# Patient Record
Sex: MALE | Race: WHITE | NOT HISPANIC OR LATINO
[De-identification: names, ages, dates, MRNs, and addresses within clinical notes are randomized per-mention and may not be internally consistent; named-entity substitution may affect disease eponyms.]

---

## 2021-05-27 PROBLEM — Z00.00 ENCOUNTER FOR PREVENTIVE HEALTH EXAMINATION: Status: ACTIVE | Noted: 2021-05-27

## 2021-06-03 ENCOUNTER — APPOINTMENT (OUTPATIENT)
Dept: CARDIOLOGY | Facility: CLINIC | Age: 61
End: 2021-06-03
Payer: MEDICAID

## 2021-06-03 ENCOUNTER — NON-APPOINTMENT (OUTPATIENT)
Age: 61
End: 2021-06-03

## 2021-06-03 VITALS
WEIGHT: 280 LBS | OXYGEN SATURATION: 97 % | TEMPERATURE: 98.5 F | SYSTOLIC BLOOD PRESSURE: 210 MMHG | DIASTOLIC BLOOD PRESSURE: 80 MMHG | BODY MASS INDEX: 35.94 KG/M2 | HEART RATE: 83 BPM | HEIGHT: 74 IN

## 2021-06-03 VITALS — SYSTOLIC BLOOD PRESSURE: 190 MMHG | DIASTOLIC BLOOD PRESSURE: 80 MMHG

## 2021-06-03 DIAGNOSIS — Z86.39 PERSONAL HISTORY OF OTHER ENDOCRINE, NUTRITIONAL AND METABOLIC DISEASE: ICD-10-CM

## 2021-06-03 DIAGNOSIS — Z87.891 PERSONAL HISTORY OF NICOTINE DEPENDENCE: ICD-10-CM

## 2021-06-03 DIAGNOSIS — I10 ESSENTIAL (PRIMARY) HYPERTENSION: ICD-10-CM

## 2021-06-03 DIAGNOSIS — R60.0 LOCALIZED EDEMA: ICD-10-CM

## 2021-06-03 DIAGNOSIS — R06.02 SHORTNESS OF BREATH: ICD-10-CM

## 2021-06-03 PROCEDURE — 93000 ELECTROCARDIOGRAM COMPLETE: CPT

## 2021-06-03 PROCEDURE — 99205 OFFICE O/P NEW HI 60 MIN: CPT

## 2021-06-03 PROCEDURE — 99072 ADDL SUPL MATRL&STAF TM PHE: CPT

## 2021-06-03 RX ORDER — INSULIN LISPRO 100 U/ML
100 INJECTION, SOLUTION INTRAVENOUS; SUBCUTANEOUS
Refills: 0 | Status: ACTIVE | COMMUNITY

## 2021-06-03 RX ORDER — AMLODIPINE BESYLATE 10 MG/1
10 TABLET ORAL
Qty: 90 | Refills: 2 | Status: ACTIVE | COMMUNITY

## 2021-06-03 RX ORDER — LISINOPRIL 30 MG/1
30 TABLET ORAL DAILY
Qty: 90 | Refills: 3 | Status: ACTIVE | COMMUNITY

## 2021-06-03 NOTE — REASON FOR VISIT
[Symptom and Test Evaluation] : symptom and test evaluation [Cardiac Failure] : cardiac failure [Arrhythmia/ECG Abnorrmalities] : arrhythmia/ECG abnormalities [Hypertension] : hypertension

## 2021-06-22 NOTE — DISCUSSION/SUMMARY
[___ Week(s)] : in [unfilled] week(s) [FreeTextEntry3] : echo, leg doppler, nuclear stress test [FreeTextEntry1] : Continue on amlodipine and lisinopril for blood pressure management plus aspirin for cardiac risk reduction.  I renewed torsemide 20 mg 1 to 2 tablets daily as directed for edema reduction and am unclear but he possibly has been on 100 mg of torsemide intermittently in the past.  I have ordered an echocardiogram to assess LV function and valvular status.  To assess underlying coronary disease burden with current cardiovascular disease risk factors and symptoms, I have ordered a nuclear perfusion stress test.  I have ordered a bilateral lower extremity venous Doppler to assess leg circulation to check for any significant venous insufficiency.  I recommended a heart healthy lifestyle including a low-saturated fat, low cholesterol diet with improved aerobic physical fitness over time for cardiovascular benefits.  Carbohydrate and sodium restriction along with weight loss over time encouraged.  Follow-up with you for care and see me in about 1 month or sooner if needed.

## 2021-06-22 NOTE — HISTORY OF PRESENT ILLNESS
[FreeTextEntry1] : Jeremy is a pleasant 61-year-old gentleman with history of type 2 diabetes, dyslipidemia, hypertension and comes to our office for cardiac assessment.  He is a former smoker and indicates currently he has been experiencing shortness of breath and worsening leg edema bilaterally.  He takes his medications faithfully including antihypertensives and diuretics which have helped.  ECG today shows left anterior hemiblock old lateral wall MI and nonspecific ST-T wave abnormalities.

## 2021-06-22 NOTE — PHYSICAL EXAM
[Well Developed] : well developed [Well Nourished] : well nourished [No Acute Distress] : no acute distress [Normal Conjunctiva] : normal conjunctiva [Normal Venous Pressure] : normal venous pressure [No Carotid Bruit] : no carotid bruit [Normal S1, S2] : normal S1, S2 [No Murmur] : no murmur [No Rub] : no rub [No Gallop] : no gallop [1+] : left 1+ [Clear Lung Fields] : clear lung fields [Good Air Entry] : good air entry [No Respiratory Distress] : no respiratory distress  [Soft] : abdomen soft [Non Tender] : non-tender [No Masses/organomegaly] : no masses/organomegaly [Normal Bowel Sounds] : normal bowel sounds [Normal Gait] : normal gait [No Edema] : no edema [No Cyanosis] : no cyanosis [No Clubbing] : no clubbing [No Varicosities] : no varicosities [No Rash] : no rash [No Skin Lesions] : no skin lesions [Moves all extremities] : moves all extremities [No Focal Deficits] : no focal deficits [Normal Speech] : normal speech [Alert and Oriented] : alert and oriented [Normal memory] : normal memory [Right Carotid Bruit] : no bruit heard over the right carotid [Left Carotid Bruit] : no bruit heard over the left carotid [Bruit] : no bruit heard

## 2021-06-22 NOTE — CARDIOLOGY SUMMARY
[de-identified] : Sinus  Rhythm  Left anterior hemiblock  -Old lateral infarct.  Nonspecific ST - T abnormalities.  ABNORMAL

## 2021-07-21 ENCOUNTER — RX RENEWAL (OUTPATIENT)
Age: 61
End: 2021-07-21

## 2021-07-22 ENCOUNTER — RX RENEWAL (OUTPATIENT)
Age: 61
End: 2021-07-22

## 2021-07-22 RX ORDER — TORSEMIDE 20 MG/1
20 TABLET ORAL
Qty: 180 | Refills: 2 | Status: ACTIVE | COMMUNITY
Start: 2021-07-22 | End: 1900-01-01

## 2022-10-31 ENCOUNTER — RX RENEWAL (OUTPATIENT)
Age: 62
End: 2022-10-31

## 2022-11-11 ENCOUNTER — INPATIENT (INPATIENT)
Facility: HOSPITAL | Age: 62
LOS: 5 days | Discharge: HOME HEALTH | DRG: 280 | End: 2022-11-17
Attending: HOSPITALIST | Admitting: INTERNAL MEDICINE
Payer: MEDICAID

## 2022-11-11 VITALS
DIASTOLIC BLOOD PRESSURE: 67 MMHG | WEIGHT: 229.94 LBS | OXYGEN SATURATION: 95 % | HEIGHT: 74 IN | RESPIRATION RATE: 18 BRPM | TEMPERATURE: 98 F | HEART RATE: 63 BPM | SYSTOLIC BLOOD PRESSURE: 172 MMHG

## 2022-11-11 DIAGNOSIS — E78.5 HYPERLIPIDEMIA, UNSPECIFIED: ICD-10-CM

## 2022-11-11 DIAGNOSIS — N17.9 ACUTE KIDNEY FAILURE, UNSPECIFIED: ICD-10-CM

## 2022-11-11 DIAGNOSIS — I21.4 NON-ST ELEVATION (NSTEMI) MYOCARDIAL INFARCTION: ICD-10-CM

## 2022-11-11 DIAGNOSIS — Z90.49 ACQUIRED ABSENCE OF OTHER SPECIFIED PARTS OF DIGESTIVE TRACT: Chronic | ICD-10-CM

## 2022-11-11 DIAGNOSIS — E11.9 TYPE 2 DIABETES MELLITUS WITHOUT COMPLICATIONS: ICD-10-CM

## 2022-11-11 DIAGNOSIS — I10 ESSENTIAL (PRIMARY) HYPERTENSION: ICD-10-CM

## 2022-11-11 DIAGNOSIS — Z89.421 ACQUIRED ABSENCE OF OTHER RIGHT TOE(S): Chronic | ICD-10-CM

## 2022-11-11 DIAGNOSIS — I50.21 ACUTE SYSTOLIC (CONGESTIVE) HEART FAILURE: ICD-10-CM

## 2022-11-11 DIAGNOSIS — J18.9 PNEUMONIA, UNSPECIFIED ORGANISM: ICD-10-CM

## 2022-11-11 LAB
ALBUMIN SERPL ELPH-MCNC: 3.7 G/DL — SIGNIFICANT CHANGE UP (ref 3.3–5)
ALP SERPL-CCNC: 65 U/L — SIGNIFICANT CHANGE UP (ref 40–120)
ALT FLD-CCNC: 26 U/L — SIGNIFICANT CHANGE UP (ref 10–45)
ANION GAP SERPL CALC-SCNC: 11 MMOL/L — SIGNIFICANT CHANGE UP (ref 5–17)
APPEARANCE UR: CLEAR — SIGNIFICANT CHANGE UP
APTT BLD: 128 SEC — CRITICAL HIGH (ref 27.5–35.5)
APTT BLD: 40.6 SEC — HIGH (ref 27.5–35.5)
AST SERPL-CCNC: 20 U/L — SIGNIFICANT CHANGE UP (ref 10–40)
BACTERIA # UR AUTO: NEGATIVE — SIGNIFICANT CHANGE UP
BILIRUB SERPL-MCNC: 0.8 MG/DL — SIGNIFICANT CHANGE UP (ref 0.2–1.2)
BILIRUB UR-MCNC: NEGATIVE — SIGNIFICANT CHANGE UP
BUN SERPL-MCNC: 60 MG/DL — HIGH (ref 7–23)
CALCIUM SERPL-MCNC: 9.1 MG/DL — SIGNIFICANT CHANGE UP (ref 8.4–10.5)
CHLORIDE SERPL-SCNC: 100 MMOL/L — SIGNIFICANT CHANGE UP (ref 96–108)
CK MB BLD-MCNC: 1.1 % — SIGNIFICANT CHANGE UP (ref 0–3.5)
CK MB CFR SERPL CALC: 5 NG/ML — SIGNIFICANT CHANGE UP (ref 0–6.7)
CK SERPL-CCNC: 454 U/L — HIGH (ref 30–200)
CO2 SERPL-SCNC: 24 MMOL/L — SIGNIFICANT CHANGE UP (ref 22–31)
COLOR SPEC: SIGNIFICANT CHANGE UP
CREAT ?TM UR-MCNC: 83 MG/DL — SIGNIFICANT CHANGE UP
CREAT SERPL-MCNC: 1.96 MG/DL — HIGH (ref 0.5–1.3)
DIFF PNL FLD: NEGATIVE — SIGNIFICANT CHANGE UP
EGFR: 38 ML/MIN/1.73M2 — LOW
EPI CELLS # UR: 0 /HPF — SIGNIFICANT CHANGE UP
GLUCOSE BLDC GLUCOMTR-MCNC: 318 MG/DL — HIGH (ref 70–99)
GLUCOSE BLDC GLUCOMTR-MCNC: 326 MG/DL — HIGH (ref 70–99)
GLUCOSE SERPL-MCNC: 253 MG/DL — HIGH (ref 70–99)
GLUCOSE UR QL: ABNORMAL
HCT VFR BLD CALC: 31.2 % — LOW (ref 39–50)
HCT VFR BLD CALC: 32.1 % — LOW (ref 39–50)
HGB BLD-MCNC: 10.2 G/DL — LOW (ref 13–17)
HGB BLD-MCNC: 10.6 G/DL — LOW (ref 13–17)
HYALINE CASTS # UR AUTO: 1 /LPF — SIGNIFICANT CHANGE UP (ref 0–2)
INR BLD: 1.2 RATIO — HIGH (ref 0.88–1.16)
KETONES UR-MCNC: NEGATIVE — SIGNIFICANT CHANGE UP
LEUKOCYTE ESTERASE UR-ACNC: NEGATIVE — SIGNIFICANT CHANGE UP
MCHC RBC-ENTMCNC: 30.4 PG — SIGNIFICANT CHANGE UP (ref 27–34)
MCHC RBC-ENTMCNC: 31.5 PG — SIGNIFICANT CHANGE UP (ref 27–34)
MCHC RBC-ENTMCNC: 32.7 GM/DL — SIGNIFICANT CHANGE UP (ref 32–36)
MCHC RBC-ENTMCNC: 33 GM/DL — SIGNIFICANT CHANGE UP (ref 32–36)
MCV RBC AUTO: 93.1 FL — SIGNIFICANT CHANGE UP (ref 80–100)
MCV RBC AUTO: 95.5 FL — SIGNIFICANT CHANGE UP (ref 80–100)
NITRITE UR-MCNC: NEGATIVE — SIGNIFICANT CHANGE UP
NRBC # BLD: 0 /100 WBCS — SIGNIFICANT CHANGE UP (ref 0–0)
NRBC # BLD: 0 /100 WBCS — SIGNIFICANT CHANGE UP (ref 0–0)
PH UR: 6 — SIGNIFICANT CHANGE UP (ref 5–8)
PLATELET # BLD AUTO: 217 K/UL — SIGNIFICANT CHANGE UP (ref 150–400)
PLATELET # BLD AUTO: 222 K/UL — SIGNIFICANT CHANGE UP (ref 150–400)
POTASSIUM SERPL-MCNC: 4.6 MMOL/L — SIGNIFICANT CHANGE UP (ref 3.5–5.3)
POTASSIUM SERPL-SCNC: 4.6 MMOL/L — SIGNIFICANT CHANGE UP (ref 3.5–5.3)
PROT SERPL-MCNC: 7.4 G/DL — SIGNIFICANT CHANGE UP (ref 6–8.3)
PROT UR-MCNC: ABNORMAL
PROTHROM AB SERPL-ACNC: 13.9 SEC — HIGH (ref 10.5–13.4)
RBC # BLD: 3.35 M/UL — LOW (ref 4.2–5.8)
RBC # BLD: 3.36 M/UL — LOW (ref 4.2–5.8)
RBC # FLD: 13.2 % — SIGNIFICANT CHANGE UP (ref 10.3–14.5)
RBC # FLD: 13.2 % — SIGNIFICANT CHANGE UP (ref 10.3–14.5)
RBC CASTS # UR COMP ASSIST: 5 /HPF — HIGH (ref 0–4)
SARS-COV-2 RNA SPEC QL NAA+PROBE: SIGNIFICANT CHANGE UP
SODIUM SERPL-SCNC: 135 MMOL/L — SIGNIFICANT CHANGE UP (ref 135–145)
SP GR SPEC: 1.01 — SIGNIFICANT CHANGE UP (ref 1.01–1.02)
TROPONIN T, HIGH SENSITIVITY RESULT: 872 NG/L — HIGH (ref 0–51)
UROBILINOGEN FLD QL: NEGATIVE — SIGNIFICANT CHANGE UP
WBC # BLD: 10.88 K/UL — HIGH (ref 3.8–10.5)
WBC # BLD: 11.01 K/UL — HIGH (ref 3.8–10.5)
WBC # FLD AUTO: 10.88 K/UL — HIGH (ref 3.8–10.5)
WBC # FLD AUTO: 11.01 K/UL — HIGH (ref 3.8–10.5)
WBC UR QL: 2 /HPF — SIGNIFICANT CHANGE UP (ref 0–5)

## 2022-11-11 PROCEDURE — 76770 US EXAM ABDO BACK WALL COMP: CPT | Mod: 26

## 2022-11-11 PROCEDURE — 99223 1ST HOSP IP/OBS HIGH 75: CPT

## 2022-11-11 PROCEDURE — 71045 X-RAY EXAM CHEST 1 VIEW: CPT | Mod: 26

## 2022-11-11 PROCEDURE — 93010 ELECTROCARDIOGRAM REPORT: CPT

## 2022-11-11 PROCEDURE — 99223 1ST HOSP IP/OBS HIGH 75: CPT | Mod: GC

## 2022-11-11 RX ORDER — DEXTROSE 50 % IN WATER 50 %
25 SYRINGE (ML) INTRAVENOUS ONCE
Refills: 0 | Status: DISCONTINUED | OUTPATIENT
Start: 2022-11-11 | End: 2022-11-17

## 2022-11-11 RX ORDER — INSULIN LISPRO 100/ML
5 VIAL (ML) SUBCUTANEOUS
Refills: 0 | Status: DISCONTINUED | OUTPATIENT
Start: 2022-11-11 | End: 2022-11-14

## 2022-11-11 RX ORDER — HEPARIN SODIUM 5000 [USP'U]/ML
INJECTION INTRAVENOUS; SUBCUTANEOUS
Qty: 25000 | Refills: 0 | Status: DISCONTINUED | OUTPATIENT
Start: 2022-11-11 | End: 2022-11-13

## 2022-11-11 RX ORDER — DEXTROSE 50 % IN WATER 50 %
15 SYRINGE (ML) INTRAVENOUS ONCE
Refills: 0 | Status: DISCONTINUED | OUTPATIENT
Start: 2022-11-11 | End: 2022-11-17

## 2022-11-11 RX ORDER — CEFTRIAXONE 500 MG/1
INJECTION, POWDER, FOR SOLUTION INTRAMUSCULAR; INTRAVENOUS
Refills: 0 | Status: COMPLETED | OUTPATIENT
Start: 2022-11-11 | End: 2022-11-12

## 2022-11-11 RX ORDER — HEPARIN SODIUM 5000 [USP'U]/ML
5000 INJECTION INTRAVENOUS; SUBCUTANEOUS ONCE
Refills: 0 | Status: DISCONTINUED | OUTPATIENT
Start: 2022-11-11 | End: 2022-11-11

## 2022-11-11 RX ORDER — METOPROLOL TARTRATE 50 MG
50 TABLET ORAL
Refills: 0 | Status: DISCONTINUED | OUTPATIENT
Start: 2022-11-11 | End: 2022-11-13

## 2022-11-11 RX ORDER — HYDRALAZINE HCL 50 MG
50 TABLET ORAL THREE TIMES A DAY
Refills: 0 | Status: DISCONTINUED | OUTPATIENT
Start: 2022-11-11 | End: 2022-11-14

## 2022-11-11 RX ORDER — HYDRALAZINE HCL 50 MG
25 TABLET ORAL EVERY 8 HOURS
Refills: 0 | Status: DISCONTINUED | OUTPATIENT
Start: 2022-11-11 | End: 2022-11-11

## 2022-11-11 RX ORDER — CEFTRIAXONE 500 MG/1
1000 INJECTION, POWDER, FOR SOLUTION INTRAMUSCULAR; INTRAVENOUS EVERY 24 HOURS
Refills: 0 | Status: COMPLETED | OUTPATIENT
Start: 2022-11-12 | End: 2022-11-12

## 2022-11-11 RX ORDER — GLUCAGON INJECTION, SOLUTION 0.5 MG/.1ML
1 INJECTION, SOLUTION SUBCUTANEOUS ONCE
Refills: 0 | Status: DISCONTINUED | OUTPATIENT
Start: 2022-11-11 | End: 2022-11-17

## 2022-11-11 RX ORDER — HYDRALAZINE HCL 50 MG
25 TABLET ORAL ONCE
Refills: 0 | Status: DISCONTINUED | OUTPATIENT
Start: 2022-11-11 | End: 2022-11-12

## 2022-11-11 RX ORDER — INSULIN LISPRO 100/ML
VIAL (ML) SUBCUTANEOUS AT BEDTIME
Refills: 0 | Status: DISCONTINUED | OUTPATIENT
Start: 2022-11-11 | End: 2022-11-17

## 2022-11-11 RX ORDER — INSULIN LISPRO 100/ML
VIAL (ML) SUBCUTANEOUS
Refills: 0 | Status: DISCONTINUED | OUTPATIENT
Start: 2022-11-11 | End: 2022-11-17

## 2022-11-11 RX ORDER — DEXTROSE 50 % IN WATER 50 %
12.5 SYRINGE (ML) INTRAVENOUS ONCE
Refills: 0 | Status: DISCONTINUED | OUTPATIENT
Start: 2022-11-11 | End: 2022-11-17

## 2022-11-11 RX ORDER — FUROSEMIDE 40 MG
40 TABLET ORAL
Refills: 0 | Status: DISCONTINUED | OUTPATIENT
Start: 2022-11-11 | End: 2022-11-13

## 2022-11-11 RX ORDER — CEFTRIAXONE 500 MG/1
1000 INJECTION, POWDER, FOR SOLUTION INTRAMUSCULAR; INTRAVENOUS ONCE
Refills: 0 | Status: COMPLETED | OUTPATIENT
Start: 2022-11-11 | End: 2022-11-11

## 2022-11-11 RX ORDER — SODIUM CHLORIDE 9 MG/ML
1000 INJECTION, SOLUTION INTRAVENOUS
Refills: 0 | Status: DISCONTINUED | OUTPATIENT
Start: 2022-11-11 | End: 2022-11-17

## 2022-11-11 RX ORDER — BACITRACIN AND POLYMYXIN B SULFATE 500; 10000 [USP'U]/G; [USP'U]/G
1 OINTMENT TOPICAL DAILY
Refills: 0 | Status: DISCONTINUED | OUTPATIENT
Start: 2022-11-11 | End: 2022-11-11

## 2022-11-11 RX ORDER — ASPIRIN/CALCIUM CARB/MAGNESIUM 324 MG
81 TABLET ORAL DAILY
Refills: 0 | Status: DISCONTINUED | OUTPATIENT
Start: 2022-11-11 | End: 2022-11-17

## 2022-11-11 RX ORDER — INSULIN GLARGINE 100 [IU]/ML
35 INJECTION, SOLUTION SUBCUTANEOUS AT BEDTIME
Refills: 0 | Status: DISCONTINUED | OUTPATIENT
Start: 2022-11-11 | End: 2022-11-14

## 2022-11-11 RX ORDER — HEPARIN SODIUM 5000 [USP'U]/ML
6000 INJECTION INTRAVENOUS; SUBCUTANEOUS EVERY 6 HOURS
Refills: 0 | Status: DISCONTINUED | OUTPATIENT
Start: 2022-11-11 | End: 2022-11-13

## 2022-11-11 RX ORDER — ATORVASTATIN CALCIUM 80 MG/1
80 TABLET, FILM COATED ORAL AT BEDTIME
Refills: 0 | Status: DISCONTINUED | OUTPATIENT
Start: 2022-11-11 | End: 2022-11-17

## 2022-11-11 RX ORDER — CLOPIDOGREL BISULFATE 75 MG/1
75 TABLET, FILM COATED ORAL DAILY
Refills: 0 | Status: DISCONTINUED | OUTPATIENT
Start: 2022-11-11 | End: 2022-11-17

## 2022-11-11 RX ORDER — AMLODIPINE BESYLATE 2.5 MG/1
10 TABLET ORAL DAILY
Refills: 0 | Status: DISCONTINUED | OUTPATIENT
Start: 2022-11-11 | End: 2022-11-17

## 2022-11-11 RX ADMIN — HEPARIN SODIUM 1200 UNIT(S)/HR: 5000 INJECTION INTRAVENOUS; SUBCUTANEOUS at 21:49

## 2022-11-11 RX ADMIN — ATORVASTATIN CALCIUM 80 MILLIGRAM(S): 80 TABLET, FILM COATED ORAL at 22:07

## 2022-11-11 RX ADMIN — Medication 4: at 17:35

## 2022-11-11 RX ADMIN — INSULIN GLARGINE 35 UNIT(S): 100 INJECTION, SOLUTION SUBCUTANEOUS at 22:01

## 2022-11-11 RX ADMIN — Medication 50 MILLIGRAM(S): at 17:23

## 2022-11-11 RX ADMIN — Medication 50 MILLIGRAM(S): at 22:11

## 2022-11-11 RX ADMIN — Medication 3: at 12:11

## 2022-11-11 RX ADMIN — Medication 40 MILLIGRAM(S): at 17:23

## 2022-11-11 RX ADMIN — HEPARIN SODIUM 1000 UNIT(S)/HR: 5000 INJECTION INTRAVENOUS; SUBCUTANEOUS at 14:55

## 2022-11-11 RX ADMIN — Medication 5 UNIT(S): at 17:32

## 2022-11-11 RX ADMIN — Medication 2: at 22:02

## 2022-11-11 RX ADMIN — CEFTRIAXONE 100 MILLIGRAM(S): 500 INJECTION, POWDER, FOR SOLUTION INTRAMUSCULAR; INTRAVENOUS at 13:07

## 2022-11-11 RX ADMIN — Medication 25 MILLIGRAM(S): at 15:34

## 2022-11-11 NOTE — H&P CARDIOLOGY - HISTORY OF PRESENT ILLNESS
62 yr old  male with PMHx of DMT2, HTN, CAD presents with SOB difficulty breathing, diaphoresis and moderate chest pain that started on 11/9/22 presented to [facility].   Troponin were 8700>5000  TTE done on 11/9/22 showed LVEF 40-45%, LV systolic function was moderately decreased.   Pt was admitted for Acute systolic Heart failure in the setting of ACS. Chest Xray with significant pulmonary edema, bilateral airspace opacities-pt started on Ceftriaxone 1 gm q 24 hrs, Clindamycin 600 mg/50 every 8 hrs IVPB, Repeat Chest Xray from 11/10/22 showed decreased pulmonary edema.   Cr 2.2 on 4/22, this admission 2.2>2.0    Pt had a recent Stress Echo as outpatient abnormal and was scheduled for elective Cardiac Cath on Thursday.  62 yr old  male with PMHx of DMT2, HTN, CAD presents with SOB difficulty breathing, diaphoresis and moderate chest pain that started on 11/9/22 presented to [facility].   Troponin were 8700>5000  TTE done on 11/9/22 showed LVEF 40-45%, LV systolic function was moderately decreased.   Pt was admitted for Acute systolic Heart failure in the setting of ACS. Chest Xray with significant pulmonary edema, bilateral airspace opacities-pt started on Ceftriaxone 1 gm q 24 hrs, Clindamycin 600 mg/50 every 8 hrs IVPB, Repeat Chest Xray from 11/10/22 showed decreased pulmonary edema.   Cr 2.2 on 4/22, this admission 2.2>2.0    Pt had a recent Stress Echo as outpatient abnormal and was scheduled for elective Cardiac Cath on Thursday.   Pt has a healing blister on left shin secondary to injury from shovel.   Bialteral LE +cellulitis with +3 edema.  62 yr old  male with PMHx of DMT2 (Hgba1c 6.0, pt follows [facility]), HTN, CAD presents with SOB difficulty breathing, diaphoresis and moderate chest pain that started on 11/9/22 presented to [facility].   Troponin were 8700>5000  TTE done on 11/9/22 showed LVEF 40-45%, LV systolic function was moderately decreased.   Pt was admitted for Acute systolic Heart failure in the setting of ACS. Chest Xray with significant pulmonary edema, bilateral airspace opacities-pt started on Ceftriaxone 1 gm q 24 hrs, Clindamycin 600 mg/50 every 8 hrs IVPB, Repeat Chest Xray from 11/10/22 showed decreased pulmonary edema.   Cr 2.2 on 4/22, this admission 2.2>2.0    Pt had a recent Stress Echo as outpatient abnormal and was scheduled for elective Cardiac Cath on Thursday.   Pt has a healing blister on left shin secondary to injury from shovel.   Bialteral LE +cellulitis with +3 edema.  62 yr old  male with PMHx of DMT2 (Hgba1c 6.0, pt follows [facility]), HTN, CAD presents with SOB difficulty breathing, diaphoresis and moderate chest pain that started on 11/9/22 presented to [facility].   Troponin were 8700>5000, pt started on Heparin gtt/ASA/Plavix.  TTE done on 11/9/22 showed LVEF 40-45%, LV systolic function was moderately decreased.   Pt was admitted for Acute systolic Heart failure in the setting of ACS. Chest Xray with significant pulmonary edema, bilateral airspace opacities-pt started on Ceftriaxone 1 gm q 24 hrs, Clindamycin 600 mg/50 every 8 hrs IVPB, Repeat Chest Xray from 11/10/22 showed decreased pulmonary edema.   Cr 2.2 on 4/22, this admission 2.2>2.0    Pt had a recent Stress Echo as outpatient abnormal and was scheduled for elective Cardiac Cath on Thursday.   Pt has a healing blister on left shin secondary to injury from shovel.   Bialteral LE +cellulitis with +3 edema.

## 2022-11-11 NOTE — PROGRESS NOTE ADULT - SUBJECTIVE AND OBJECTIVE BOX
Ehsan Weiss MD  [facility] Division of Hospital Medicine  Available via MS Teams    HPI:  62M with HTN, HLD, T2DM transfer from [facility] for NSTEMI. Pt ate dinner on Tuesday and drank a lot of water, then experienced severe shortness of breath and dyspnea on minimal exertion. He was unable to speak in full sentences at the time. He then went to [facility], and after a few hours he began to feel better. Pt was treated for NSTEMI/HF exacerbation and then transferred to [facility] for cath. TTE done on 11/9/22 showed LVEF 40-45%, LV systolic function was moderately decreased.     Pt seen in [facility]. Pt feels well, denies any acute complaints, feels comfortable, eating lunch.     REVIEW OF SYSTEMS:  CONSTITUTIONAL: No fever, chills  EYES: No eye pain, visual disturbances  ENMT:  No difficulty hearing, No sinus or throat pain  NECK: No pain or stiffness  RESPIRATORY: +SOB. No cough, wheezing  CARDIOVASCULAR: +leg swelling. No chest pain, palpitations  GASTROINTESTINAL: No abdominal, No nausea, vomiting, No diarrhea or constipation. No melena or hematochezia.  GENITOURINARY: +polyuria. No dysuria  NEUROLOGICAL: No headaches, numbness, or tremors  SKIN: No itching, burning, rashes, or lesions   LYMPH NODES: No enlarged glands  ENDOCRINE: No heat or cold intolerance; No hair loss  MUSCULOSKELETAL: No joint pain or swelling;   PSYCHIATRIC: No depression, anxiety, mood swings, or difficulty sleeping  HEME/LYMPH: No easy bruising, or bleeding gums  ALLERGY AND IMMUNOLOGIC: No hives or eczema    PAST MEDICAL & SURGICAL HISTORY:  Type 2 diabetes mellitus  HTN (hypertension)  HLD (hyperlipidemia)  History of cholecystectomy  H/O amputation of lesser toe, right  small toe      Allergies    No Known Allergies    Intolerances      Social History: former smoker, quit 30 years ago, occasional etoh    FAMILY HISTORY: denies FH of hypertension      MEDICATIONS  (STANDING):  amLODIPine   Tablet 10 milliGRAM(s) Oral daily  aspirin enteric coated 81 milliGRAM(s) Oral daily  bacitracin/polymyxin B Ointment 1 Application(s) Topical daily  cefTRIAXone   IVPB      cefTRIAXone   IVPB 1000 milliGRAM(s) IV Intermittent once  clopidogrel Tablet 75 milliGRAM(s) Oral daily  dextrose 5%. 1000 milliLiter(s) (50 mL/Hr) IV Continuous <Continuous>  dextrose 5%. 1000 milliLiter(s) (100 mL/Hr) IV Continuous <Continuous>  dextrose 50% Injectable 25 Gram(s) IV Push once  dextrose 50% Injectable 12.5 Gram(s) IV Push once  dextrose 50% Injectable 25 Gram(s) IV Push once  furosemide   Injectable 40 milliGRAM(s) IV Push two times a day  glucagon  Injectable 1 milliGRAM(s) IntraMuscular once  heparin   Injectable 5000 Unit(s) IV Push once  heparin  Infusion.  Unit(s)/Hr (10 mL/Hr) IV Continuous <Continuous>  insulin glargine Injectable (LANTUS) 35 Unit(s) SubCutaneous at bedtime  insulin lispro (ADMELOG) corrective regimen sliding scale   SubCutaneous three times a day before meals  insulin lispro (ADMELOG) corrective regimen sliding scale   SubCutaneous at bedtime  metoprolol tartrate 50 milliGRAM(s) Oral two times a day    MEDICATIONS  (PRN):  dextrose Oral Gel 15 Gram(s) Oral once PRN Blood Glucose LESS THAN 70 milliGRAM(s)/deciliter    Home Medications:  amLODIPine 10 mg oral tablet: 1 tab(s) orally once a day  home/hospital (11 Nov 2022 11:53)  aspirin 81 mg oral delayed release tablet: 1 tab(s) orally once a day  home/hospital (11 Nov 2022 11:53)  bacitracin-polymyxin B 500 units-10,000 units/g topical ointment: Apply topically to affected area once a day  hospital (11 Nov 2022 11:53)  cefTRIAXone 1 g/50 mL-iso-osmotic dextrose intravenous solution: intravenous once a day  hospital (11 Nov 2022 11:53)  clindamycin 600 mg/50 mL-D5% intravenous solution: intravenous every 8 hours  HOSPITAL (11 Nov 2022 11:53)  Colace 100 mg oral capsule: 1 cap(s) orally 2 times a day  HOME (11 Nov 2022 11:53)  heparin 1 unit/mL-NaCl 0.9% intravenous solution (obsolete): hospital (11 Nov 2022 11:53)  HumaLOG 100 units/mL subcutaneous solution: 10 unit(s) subcutaneous 3 times a day  home (11 Nov 2022 11:53)  HumaLOG 100 units/mL subcutaneous solution: sliding scale   hospital (11 Nov 2022 11:53)  insulin glargine 100 units/mL subcutaneous solution: 47 unit(s) subcutaneous once a day (at bedtime)  home (11 Nov 2022 11:53)  Lasix 10 mg/mL injectable solution: 40 milligram(s) injectable every 12 hours  Hospital (11 Nov 2022 11:53)  Lopressor 50 mg oral tablet: 1 tab(s) orally 2 times a day  hospital (11 Nov 2022 11:53)  losartan 50 mg oral tablet: 1 tab(s) orally once a day  home (11 Nov 2022 11:53)  Plavix 75 mg oral tablet: 1 tab(s) orally once a day  hospital (11 Nov 2022 11:53)  Senna 8.6 mg oral tablet: 1 tab(s) orally once a day (at bedtime)  HOME (11 Nov 2022 11:53)  torsemide 20 mg oral tablet: 1 tab(s) orally once a day  HOME (11 Nov 2022 11:53)      CAPILLARY BLOOD GLUCOSE      POCT Blood Glucose.: 263 mg/dL (11 Nov 2022 11:11)    I&O's Summary      Physical Exam:  Vital Signs Last 24 Hrs  T(C): 36.7 (11 Nov 2022 11:33), Max: 36.7 (11 Nov 2022 11:33)  T(F): 98.1 (11 Nov 2022 11:33), Max: 98.1 (11 Nov 2022 11:33)  HR: 63 (11 Nov 2022 11:33) (63 - 63)  BP: 172/67 (11 Nov 2022 11:33) (172/67 - 172/67)  RR: 18 (11 Nov 2022 11:33) (18 - 18)  SpO2: 95% (11 Nov 2022 11:33) (95% - 95%)    Parameters below as of 11 Nov 2022 11:33  Patient On (Oxygen Delivery Method): nasal cannula  O2 Flow (L/min): 2      CONSTITUTIONAL: NAD, sitting up eating lunch  NECK: Supple, no palpable masses; no thyromegaly  RESPIRATORY: CTABL; no wheezes or crackles  CARDIOVASCULAR: normal S1 and S2, no murmur/rub/gallop; 2+ pitting edema b/l symmetric   ABDOMEN: soft, nt, nd   MUSCULOSKELETAL:  no clubbing or cyanosis of digits; no joint swelling or tenderness to palpation  PSYCH: A+O to person, place, and time; affect appropriate  NEUROLOGY: CN 2-12 are intact and symmetric; no gross sensory deficits   SKIN: b/l LE venous stasis changes    LABS:  pending            RADIOLOGY & ADDITIONAL TESTS:  Results Reviewed:   Imaging Personally Reviewed:  Electrocardiogram Personally Reviewed:    COORDINATION OF CARE:  Care Discussed with Consultants/Other Providers [Y/N]:  Prior or Outpatient Records Reviewed [Y/N]:

## 2022-11-11 NOTE — H&P CARDIOLOGY - NSICDXPASTSURGICALHX_GEN_ALL_CORE_FT
PAST SURGICAL HISTORY:  H/O amputation of lesser toe, right small toe    History of cholecystectomy

## 2022-11-11 NOTE — PROVIDER CONTACT NOTE (CRITICAL VALUE NOTIFICATION) - ACTION/TREATMENT ORDERED:
provider made aware. Continue to monitor for now. possible rpt trop later
notified MD. will hold up the heparin drip for 1 hr( restart at 2:45pm). and restart at rate of 10cc/hr. report is given to RN in [address] [place] [address]. pt is pending for transport . heparin is on hold now.

## 2022-11-11 NOTE — PROGRESS NOTE ADULT - PROBLEM SELECTOR PLAN 3
- c/w amlodipine  - start ace/arb/arni when RADHA is improved/more euvolemic  - eventually stop amlodipine given b/l LE edema  - switch BB to carvedilol if further bp control required RADHA on CKD. Baseline Cr 1.2, has had RAHDA to 2.2 before at [facility]. Favor cardiorenal syndrome i/s/o HF exacerbation  - f/u UA, renal/bladder ultrasound  - Obtain FEUrea  - diuresis as above  - trend cr  - avoid nephrotoxic medications, nsaid  - renally dose meds RADHA, baseline Cr 1.2, has had RADHA to 2.2 before at [facility]. Favor cardiorenal syndrome i/s/o HF exacerbation  - f/u UA, renal/bladder ultrasound  - Obtain FEUrea  - diuresis as above  - trend cr  - avoid nephrotoxic medications, nsaid  - renally dose meds

## 2022-11-11 NOTE — PROGRESS NOTE ADULT - PROBLEM SELECTOR PLAN 1
- c/w heparin gtt, aspirin, plavix   - cardiology consult  - timing of cath pending improvement of volume status NSTEMI at [facility], trops 8700->5000, started on asa/plavix/heparin gtt 11/9/22  - c/w heparin gtt, aspirin, plavix   - cardiology consult  - timing of cath pending improvement of volume status  - stat ekg/cardiac enzymes if pt experiences new chest pain

## 2022-11-11 NOTE — CONSULT NOTE ADULT - SUBJECTIVE AND OBJECTIVE BOX
HPI:  62 yr old  male with PMHx of DMT2 (Hgba1c 6.0, pt follows [facility]o PMD), HTN, CAD presents with SOB difficulty breathing, diaphoresis and moderate chest pain that started on 11/9/22 presented to [facility].   Troponin were 8700>5000, pt started on Heparin gtt/ASA/Plavix.  TTE done on 11/9/22 showed LVEF 40-45%, LV systolic function was moderately decreased.   Pt was admitted for Acute systolic Heart failure in the setting of ACS. Chest Xray with significant pulmonary edema, bilateral airspace opacities-pt started on Ceftriaxone 1 gm q 24 hrs, Clindamycin 600 mg/50 every 8 hrs IVPB, Repeat Chest Xray from 11/10/22 showed decreased pulmonary edema.   Cr 2.2 on 4/22, this admission 2.2>2.0    Pt had a recent Stress Echo as outpatient abnormal and was scheduled for elective Cardiac Cath on Thursday.   Pt has a healing blister on left shin secondary to injury from shovel.   Bialteral LE +cellulitis with +3 edema.  (11 Nov 2022 11:00)    Cardiology consulted for NSTEMI.     MEDICATIONS  (STANDING):  amLODIPine   Tablet 10 milliGRAM(s) Oral daily  aspirin enteric coated 81 milliGRAM(s) Oral daily  bacitracin/polymyxin B Ointment 1 Application(s) Topical daily  cefTRIAXone   IVPB      clopidogrel Tablet 75 milliGRAM(s) Oral daily  dextrose 5%. 1000 milliLiter(s) (50 mL/Hr) IV Continuous <Continuous>  dextrose 5%. 1000 milliLiter(s) (100 mL/Hr) IV Continuous <Continuous>  dextrose 50% Injectable 25 Gram(s) IV Push once  dextrose 50% Injectable 12.5 Gram(s) IV Push once  dextrose 50% Injectable 25 Gram(s) IV Push once  furosemide   Injectable 40 milliGRAM(s) IV Push two times a day  glucagon  Injectable 1 milliGRAM(s) IntraMuscular once  heparin   Injectable 5000 Unit(s) IV Push once  heparin  Infusion.  Unit(s)/Hr (10 mL/Hr) IV Continuous <Continuous>  insulin glargine Injectable (LANTUS) 35 Unit(s) SubCutaneous at bedtime  insulin lispro (ADMELOG) corrective regimen sliding scale   SubCutaneous three times a day before meals  insulin lispro (ADMELOG) corrective regimen sliding scale   SubCutaneous at bedtime  metoprolol tartrate 50 milliGRAM(s) Oral two times a day    MEDICATIONS  (PRN):  dextrose Oral Gel 15 Gram(s) Oral once PRN Blood Glucose LESS THAN 70 milliGRAM(s)/deciliter      Vital Signs Last 24 Hrs  T(C): 36.7 (11 Nov 2022 11:33), Max: 36.7 (11 Nov 2022 11:33)  T(F): 98.1 (11 Nov 2022 11:33), Max: 98.1 (11 Nov 2022 11:33)  HR: 63 (11 Nov 2022 11:33) (63 - 63)  BP: 172/67 (11 Nov 2022 11:33) (172/67 - 172/67)  BP(mean): --  RR: 18 (11 Nov 2022 11:33) (18 - 18)  SpO2: 95% (11 Nov 2022 11:33) (95% - 95%)    Parameters below as of 11 Nov 2022 11:33  Patient On (Oxygen Delivery Method): nasal cannula  O2 Flow (L/min): 2    CAPILLARY BLOOD GLUCOSE      POCT Blood Glucose.: 263 mg/dL (11 Nov 2022 11:11)    I&O's Summary      PHYSICAL EXAM:*******incomplete******  GENERAL: NAD, well-developed  HEAD:  Atraumatic, Normocephalic  EYES: EOMI, PERRLA, conjunctiva and sclera clear  NECK: Supple, No JVD  CHEST/LUNG: Clear to auscultation bilaterally; No wheeze  HEART: Regular rate and rhythm; No murmurs, rubs, or gallops  ABDOMEN: Soft, Nontender, Nondistended; Bowel sounds present  EXTREMITIES:  2+ Peripheral Pulses, No clubbing, cyanosis, or edema  PSYCH: AAOx3  NEUROLOGY: non-focal  SKIN: No rashes or lesions    LABS:                    RADIOLOGY & ADDITIONAL TESTS:    Imaging Personally Reviewed:    Consultant(s) Notes Reviewed:      Care Discussed with Consultants/Other Providers:   HPI:  62 yr old  male with PMHx of DMT2 (Hgba1c 6.0, pt follows [facility] PMD), HTN, CAD presents with SOB difficulty breathing, diaphoresis and moderate chest pain that started on 11/9/22 presented to [facility].   Troponin were 8700>5000, pt started on Heparin gtt/ASA/Plavix.  TTE done on 11/9/22 showed LVEF 40-45%, LV systolic function was moderately decreased.   Pt was admitted for Acute systolic Heart failure in the setting of ACS. Chest Xray with significant pulmonary edema, bilateral airspace opacities-pt started on Ceftriaxone 1 gm q 24 hrs, Clindamycin 600 mg/50 every 8 hrs IVPB, Repeat Chest Xray from 11/10/22 showed decreased pulmonary edema.   Cr 2.2 on 4/22, this admission 2.2>2.0    Pt had a recent Stress Echo as outpatient abnormal and was scheduled for elective Cardiac Cath on Thursday.   Pt has a healing blister on left shin secondary to injury from shovel.   Bialteral LE +cellulitis with +3 edema.  (11 Nov 2022 11:00)    Cardiology consulted for NSTEMI. Patient doing well, denies chest pain or shortness of breath at this time. Denies palpitations. No n/v, good appetite.     Cardiac Hx:  - abnormal stress test 2-3 weeks ago  - TTE with EF 40-45%  - no prior coronary angiograms  - primary cardiologist is Dr. Berto Nicole [phone]    Home meds:  insulin  torsemide 20mg  losartan 50mg daily  amlodipine 10mg daily    MEDICATIONS  (STANDING):  amLODIPine   Tablet 10 milliGRAM(s) Oral daily  aspirin enteric coated 81 milliGRAM(s) Oral daily  bacitracin/polymyxin B Ointment 1 Application(s) Topical daily  cefTRIAXone   IVPB      clopidogrel Tablet 75 milliGRAM(s) Oral daily  dextrose 5%. 1000 milliLiter(s) (50 mL/Hr) IV Continuous <Continuous>  dextrose 5%. 1000 milliLiter(s) (100 mL/Hr) IV Continuous <Continuous>  dextrose 50% Injectable 25 Gram(s) IV Push once  dextrose 50% Injectable 12.5 Gram(s) IV Push once  dextrose 50% Injectable 25 Gram(s) IV Push once  furosemide   Injectable 40 milliGRAM(s) IV Push two times a day  glucagon  Injectable 1 milliGRAM(s) IntraMuscular once  heparin   Injectable 5000 Unit(s) IV Push once  heparin  Infusion.  Unit(s)/Hr (10 mL/Hr) IV Continuous <Continuous>  insulin glargine Injectable (LANTUS) 35 Unit(s) SubCutaneous at bedtime  insulin lispro (ADMELOG) corrective regimen sliding scale   SubCutaneous three times a day before meals  insulin lispro (ADMELOG) corrective regimen sliding scale   SubCutaneous at bedtime  metoprolol tartrate 50 milliGRAM(s) Oral two times a day    MEDICATIONS  (PRN):  dextrose Oral Gel 15 Gram(s) Oral once PRN Blood Glucose LESS THAN 70 milliGRAM(s)/deciliter      Vital Signs Last 24 Hrs  T(C): 36.7 (11 Nov 2022 11:33), Max: 36.7 (11 Nov 2022 11:33)  T(F): 98.1 (11 Nov 2022 11:33), Max: 98.1 (11 Nov 2022 11:33)  HR: 63 (11 Nov 2022 11:33) (63 - 63)  BP: 172/67 (11 Nov 2022 11:33) (172/67 - 172/67)  BP(mean): --  RR: 18 (11 Nov 2022 11:33) (18 - 18)  SpO2: 95% (11 Nov 2022 11:33) (95% - 95%)    Parameters below as of 11 Nov 2022 11:33  Patient On (Oxygen Delivery Method): nasal cannula  O2 Flow (L/min): 2    CAPILLARY BLOOD GLUCOSE      POCT Blood Glucose.: 263 mg/dL (11 Nov 2022 11:11)    I&O's Summary      PHYSICAL EXAM:  GENERAL: NAD, well-developed, sitting comfortably in bed  HEAD:  Atraumatic, Normocephalic  EYES: EOMI, PERRLA, anicteric sclera  NECK: Supple, JVD appreciable mid neck  CHEST/LUNG: crackles at lung bases bilaterally  HEART: Normal rate and regular rhythm; No murmurs, rubs, or gallops  ABDOMEN: Soft, Nontender, Nondistended; Bowel sounds present  EXTREMITIES:  2+ Peripheral Pulses, + pitting edema  PSYCH: AAOx3  NEUROLOGY: non-focal  SKIN: left lower extremity wound that is bandaged     LABS:                          10.2   10.88 )-----------( 222      ( 11 Nov 2022 12:42 )             31.2   11-11    135  |  100  |  x   ----------------------------<  x   4.6   |  x   |  x                       RADIOLOGY & ADDITIONAL TESTS:    Imaging Personally Reviewed:    Consultant(s) Notes Reviewed:      Care Discussed with Consultants/Other Providers:   HPI:  62 yr old  male with PMHx of DMT2 (Hgba1c 6.0, pt follows Demayo PMD), HTN and CAD.  He reports difficulty breathing, diaphoresis and moderate chest pain that started on 11/9/22 when he presented to [facility].  There, troponin were 8700>5000 and he was started on heparin gtt/ASA/Plavix.  TTE was done on 11/9/22; reported to show LVEF 40-45%, LV systolic function described as moderately decreased.   He was admitted for acute systolic heart failure in the setting of ACS, as CXR showed significant pulmonary edema, bilateral airspace opacities.  Pt started on Ceftriaxone 1 gm q 24 hrs, Clindamycin 600 mg/50 every 8 hrs IVPB, Repeat Chest Xray from 11/10/22 showed decreased pulmonary edema.   Cr 2.2 on 4/22, this admission 2.2>2.0    Pt had a recent Stress Echo as outpatient which was abnormal and he was scheduled for elective Cardiac Cath on Thursday of next week.     Pt has a healing blister on left shin secondary to injury from shovel.   Also has bilateral LE +cellulitis with +3 edema.  (11 Nov 2022 11:00)      Cardiology consulted for NSTEMI.  At present, patient doing better - he  denies chest pain, shortness of breath at this time, denies palpitations. No n/v, good appetite.       Cardiac Hx:  - abnormal stress test 2-3 weeks ago  - TTE with EF 40-45%  - no prior coronary angiograms  - primary cardiologist is Dr. Berto Nicole [phone]    Home meds:  insulin  torsemide 20mg  losartan 50mg daily  amlodipine 10mg daily    MEDICATIONS  (STANDING):  amLODIPine   Tablet 10 milliGRAM(s) Oral daily  aspirin enteric coated 81 milliGRAM(s) Oral daily  bacitracin/polymyxin B Ointment 1 Application(s) Topical daily  cefTRIAXone   IVPB      clopidogrel Tablet 75 milliGRAM(s) Oral daily  dextrose 5%. 1000 milliLiter(s) (50 mL/Hr) IV Continuous <Continuous>  dextrose 5%. 1000 milliLiter(s) (100 mL/Hr) IV Continuous <Continuous>  dextrose 50% Injectable 25 Gram(s) IV Push once  dextrose 50% Injectable 12.5 Gram(s) IV Push once  dextrose 50% Injectable 25 Gram(s) IV Push once  furosemide   Injectable 40 milliGRAM(s) IV Push two times a day  glucagon  Injectable 1 milliGRAM(s) IntraMuscular once  heparin   Injectable 5000 Unit(s) IV Push once  heparin  Infusion.  Unit(s)/Hr (10 mL/Hr) IV Continuous <Continuous>  insulin glargine Injectable (LANTUS) 35 Unit(s) SubCutaneous at bedtime  insulin lispro (ADMELOG) corrective regimen sliding scale   SubCutaneous three times a day before meals  insulin lispro (ADMELOG) corrective regimen sliding scale   SubCutaneous at bedtime  metoprolol tartrate 50 milliGRAM(s) Oral two times a day    MEDICATIONS  (PRN):  dextrose Oral Gel 15 Gram(s) Oral once PRN Blood Glucose LESS THAN 70 milliGRAM(s)/deciliter    Social History:  · Marital Status	  · Lives With	spouse    Substance Use History:  · Substance Use	never used  caffeine  · Caffeine Type	coffee; tea  · Caffeine Amount/Frequency	1-2 cups/cans per day    Alcohol Use History:  · Alcohol Use Comment	occasion    Tobacco Usage:  · Tobacco Usage: Former smoker  · Tobacco Type: cigarettes  quit 30 yrs ago    Family hx.:  no heart disease    ROS:  General: no fatigue/malaise, weight loss/gain.  Skin: no rashes.  Eyes: no blurred vision, no loss of vision. 	  ENT: no sore throat, rhinorrhea, sinus congestion.  Gastrointestinal:  no N/V/D, no melena/hematemesis/hematochezia.  Genitourinary: no dysuria/hesitancy or hematuria.  Musculoskeletal: no myalgias or arthralgias.  Neurological: no changes in vision or hearing, no lightheadedness/dizziness, no syncope/near syncope	  Psychiatric: no unusual stress/anxiety.   Hematology/Lymphatics: no unusual bleeding, bruising and no lymphadenopathy.  All others negative except as stated above and in HPI.       Vital Signs Last 24 Hrs  T(C): 36.7 (11 Nov 2022 11:33), Max: 36.7 (11 Nov 2022 11:33)  T(F): 98.1 (11 Nov 2022 11:33), Max: 98.1 (11 Nov 2022 11:33)  HR: 63 (11 Nov 2022 11:33) (63 - 63)  BP: 172/67 (11 Nov 2022 11:33) (172/67 - 172/67)  RR: 18 (11 Nov 2022 11:33) (18 - 18)  SpO2: 95% (11 Nov 2022 11:33) (95% - 95%)      PHYSICAL EXAM:  GENERAL: NAD, well-developed, sitting comfortably in bed  HEAD:  Atraumatic, Normocephalic  EYES: EOMI, PERRLA, anicteric sclera  NECK: Supple, JVD appreciable mid neck  CHEST/LUNG: crackles at lung bases bilaterally  HEART: Normal rate and regular rhythm; No murmurs, rubs, or gallops  ABDOMEN: Soft, Nontender, Nondistended; Bowel sounds present  EXTREMITIES:  2+ Peripheral Pulses, + pitting edema  PSYCH: AAOx3  NEUROLOGY: non-focal  SKIN: left lower extremity wound that is bandaged       LABS:                     10.2   10.88 )-----------( 222      ( 11 Nov 2022 12:42 )             31.2     11-11  135  |  100  |  x   ----------------------------<  x   4.6   |  x   |  x     CARDIAC MARKERS ( 11 Nov 2022 16:09 )  x     / x     / 454 U/L / x     / 5.0 ng/mL  Troponin T, High Sensitivity Result: 872: Specimen not hemolyzed

## 2022-11-11 NOTE — PROGRESS NOTE ADULT - ASSESSMENT
62M with HTN, HLD, T2DM transfer from [facility] for NSTEMI complicated by heart failure exacerbation.

## 2022-11-11 NOTE — PATIENT PROFILE ADULT - FALL HARM RISK - UNIVERSAL INTERVENTIONS
Bed in lowest position, wheels locked, appropriate side rails in place/Call bell, personal items and telephone in reach/Instruct patient to call for assistance before getting out of bed or chair/Non-slip footwear when patient is out of bed/[place] to call system/Physically safe environment - no spills, clutter or unnecessary equipment/Purposeful Proactive Rounding/Room/bathroom lighting operational, light cord in reach

## 2022-11-11 NOTE — PROGRESS NOTE ADULT - PROBLEM SELECTOR PLAN 4
- c/w high intensity statin - c/w amlodipine  - start ace/arb/arni when RADHA is improved/more euvolemic  - eventually stop amlodipine given b/l LE edema  - switch BB to carvedilol if further bp control required

## 2022-11-11 NOTE — CONSULT NOTE ADULT - ASSESSMENT
Assessment and Recommendations:      REC:  1. NSTEMI        Lalo Mcginnis MD  Cardiology Resident    RECOMMENDATIONS NOT FINALIZED UNTIL NOTE SIGNED BY ATTENDING Assessment and Recommendations:  61yo M with PMH of T2DM, HTN, CAD who presents as a transfer from [facility] for NSTEMI and heart failure exacerbation. Cardiology consulted for NSTEMI and hear failure exacerbation.    REC:  1. NSTEMI  - patient had troponin 8700->5000 at OSH, started on ASA/Plavix/Heparin gtt since 11/9/2022 per chart  - continue ASA 81mg daily  - add atorvastatin 80mg daily  - continue metoprolol tartrate 50mg BID  - plan for cardiac catheterization with Dr. Nagy on Monday    2. Acute on chronic heart failure with EF 40-45%  - patient still appears volume overloaded here    - obtain repeat TTE   - continue diuretics with lasix 40mg IV BID  - continue metoprolol tartrate 50mg BID, should transition to metoprolol succinate prior to discharge  - ARB???    3. HTN  - continue amlodipine 10mg***  - ARB***        Lalo Mcginnis MD  Cardiology Resident    RECOMMENDATIONS NOT FINALIZED UNTIL NOTE SIGNED BY ATTENDING Assessment and Recommendations:  63yo M with PMH of T2DM, HTN, CAD who presents as a transfer from [facility] for NSTEMI and heart failure exacerbation. Cardiology consulted for NSTEMI and hear failure exacerbation.    REC:  1. NSTEMI  - patient had troponin 8700->5000 at OSH, started on ASA/Plavix/Heparin gtt since 11/9/2022 per chart  - continue ASA 81mg daily  - add atorvastatin 80mg daily  - continue metoprolol tartrate 50mg BID  - plan for cardiac catheterization with Dr. Nagy on Monday    2. Acute on chronic heart failure with EF 40-45%  - patient still appears volume overloaded here    - obtain repeat TTE   - continue diuretics with lasix 40mg IV BID  - continue metoprolol tartrate 50mg BID, should transition to metoprolol succinate prior to discharge  - ARB???  - Strict I/Os and daily standing weights  - replete electrolytes K>4, Mg>2    3. HTN  - continue amlodipine 10mg***  - ARB***        Lalo Mcginnis MD  Cardiology Resident    RECOMMENDATIONS NOT FINALIZED UNTIL NOTE SIGNED BY ATTENDING Assessment and Recommendations:  63yo M with PMH of T2DM, HTN, CAD who presents as a transfer from [facility] for NSTEMI and heart failure exacerbation. Cardiology consulted for NSTEMI and hear failure exacerbation.    REC:  1. NSTEMI  - patient had troponin 8700->5000 at OSH, started on ASA/Plavix/Heparin gtt since 11/9/2022 per chart  - stop heparin gtt after 48hr total  - continue ASA 81mg daily  - continue plavix 75mf daily???  - add atorvastatin 80mg daily  - continue metoprolol tartrate 50mg BID  - plan for cardiac catheterization with Dr. Nagy on Monday    2. Acute on chronic heart failure with EF 40-45%  - patient still appears volume overloaded here    - obtain repeat TTE   - continue diuretics with lasix 40mg IV BID  - continue metoprolol tartrate 50mg BID, should transition to metoprolol succinate prior to discharge  - hold ACE/ARB in setting of RADHA vs CKD- unclear baseline  - Strict I/Os and daily standing weights  - replete electrolytes K>4, Mg>2    3. HTN  - continue amlodipine 10mg***  - ARB held in setting of RADHA vs CKD      Lalo Mcginnis MD  Cardiology Resident    RECOMMENDATIONS NOT FINALIZED UNTIL NOTE SIGNED BY ATTENDING Assessment and Recommendations:  61yo M with PMH of T2DM, HTN, CAD who presents as a transfer from [facility] for NSTEMI and heart failure exacerbation. Cardiology consulted for NSTEMI and hear failure exacerbation.    REC:  1. NSTEMI  - patient had troponin 8700->5000 at OSH, started on ASA/Plavix/Heparin gtt since 11/9/2022 per chart  - stop heparin gtt after 48hr total  - continue ASA 81mg daily  - continue plavix 75mf daily???  - add atorvastatin 80mg daily  - continue metoprolol tartrate 50mg BID  - plan for cardiac catheterization with Dr. Nagy on Monday    2. Acute on chronic heart failure with EF 40-45%  - patient still appears volume overloaded here    - obtain repeat TTE   - continue diuretics with lasix 40mg IV BID  - continue metoprolol tartrate 50mg BID, should transition to metoprolol succinate prior to discharge  - hold ACE/ARB in setting of RADHA vs CKD- baseline Cr 1.31 (5/24/21); likely progression of CKD  - Strict I/Os and daily standing weights  - replete electrolytes K>4, Mg>2  - add imdur 30mg daily********  - add hydralazine 25 TID********** (incomplete)    3. HTN  - continue amlodipine 10mg***  - ARB held in setting of RADHA vs CKD as above      Lalo Mcginnis MD  Cardiology Resident    RECOMMENDATIONS NOT FINALIZED UNTIL NOTE SIGNED BY ATTENDING Assessment and Recommendations:  61yo M with PMH of T2DM, HTN, CAD who presents as a transfer from [facility] for NSTEMI and heart failure exacerbation. Cardiology consulted for NSTEMI and hear failure exacerbation.    REC:  1. NSTEMI  - patient had troponin 8700->5000 at OSH, started on ASA/Plavix/Heparin gtt since 11/9/2022 per chart  - stop heparin gtt after 48hr total  -Please check troponin and CKMb  - continue ASA 81mg daily  - continue plavix 75mf daily???  - add atorvastatin 80mg daily  - continue metoprolol tartrate 50mg BID  - plan for cardiac catheterization with Dr. Nagy on Monday    2. Acute on chronic heart failure with EF 40-45%  - patient still appears volume overloaded here    - obtain repeat TTE   - continue diuretics with lasix 40mg IV BID  - continue metoprolol tartrate 50mg BID, should transition to metoprolol succinate prior to discharge  - hold ACE/ARB in setting of RADHA vs CKD- baseline Cr 1.31 (5/24/21); likely progression of CKD  - Strict I/Os and daily standing weights  - replete electrolytes K>4, Mg>2  - add imdur 30mg daily********  - add hydralazine 25 TID********** (incomplete)    3. HTN  - continue amlodipine 10mg***  - ARB held in setting of RADHA vs CKD as above      Lalo Mcginnis MD  Cardiology Resident    RECOMMENDATIONS NOT FINALIZED UNTIL NOTE SIGNED BY ATTENDING Assessment and Recommendations:  63yo M with PMH of T2DM, HTN, CAD who presents as a transfer from [facility] for NSTEMI and heart failure exacerbation. Cardiology consulted for NSTEMI and hear failure exacerbation.    REC:  1. NSTEMI  - patient had troponin 8700->5000 at OSH, started on ASA/Plavix/Heparin gtt since 11/9/2022 per chart  - stop heparin gtt after 48hr total  -Please check troponin and CKMb  - continue ASA 81mg daily  - continue plavix 75mg daily  - add atorvastatin 80mg daily  - continue metoprolol tartrate 50mg BID  - plan for cardiac catheterization with Dr. Nagy on Monday    2. Acute on chronic heart failure with EF 40-45%  - patient still appears volume overloaded here    - obtain repeat TTE   - continue diuretics with lasix 40mg IV BID  - continue metoprolol tartrate 50mg BID, should transition to metoprolol succinate prior to discharge  - hold ACE/ARB in setting of RADHA vs CKD- baseline Cr 1.31 (5/24/21); likely progression of CKD  - Strict I/Os and daily standing weights  - replete electrolytes K>4, Mg>2  - add imdur 30mg daily********  - add hydralazine 25 TID********** (incomplete)    3. HTN  - continue amlodipine 10mg***  - ARB held in setting of RADHA vs CKD as above      Lalo Mcginnis MD  Cardiology Resident    RECOMMENDATIONS NOT FINALIZED UNTIL NOTE SIGNED BY ATTENDING Assessment and Recommendations:  63yo M with PMH of T2DM, HTN, CAD who presents as a transfer from [facility] for NSTEMI and heart failure exacerbation. Cardiology consulted for NSTEMI and hear failure exacerbation.    REC:  1. NSTEMI  - patient had troponin 8700->5000 at OSH, started on ASA/Plavix/Heparin gtt since 11/9/2022 per chart  - stop heparin gtt after 48hr total  -Please check troponin and CKMb  - continue ASA 81mg daily  - continue plavix 75mg daily  - add atorvastatin 80mg daily  - continue metoprolol tartrate 50mg BID  - plan for cardiac catheterization with Dr. Nagy on Monday    2. Acute on chronic heart failure with EF 40-45%  - patient still appears volume overloaded here    - obtain repeat TTE   - continue diuretics with lasix 40mg IV BID  - continue metoprolol tartrate 50mg BID, should transition to metoprolol succinate prior to discharge  - hold ACE/ARB in setting of RADHA vs CKD- baseline Cr 1.31 (5/24/21); likely progression of CKD  - Strict I/Os and daily standing weights  - replete electrolytes K>4, Mg>2  - add hydralazine 25 TID    3. HTN  - continue amlodipine 10mg  - ARB held in setting of RADHA vs CKD as above  - hydralazine as above      Lalo Mcginnis MD  Cardiology Resident    RECOMMENDATIONS NOT FINALIZED UNTIL NOTE SIGNED BY ATTENDING 63 yo M with PMH of T2DM, HTN, CAD.  He presents as a transfer from [facility] for NSTEMI for heart failure exacerbation.   Cardiology consulted for NSTEMI and heart failure exacerbation.      REC:  1. NSTEMI  - patient had troponin 8700->5000 at OSH, started on ASA/Plavix/Heparin gtt since 11/9/2022 per chart  - top heparin gtt after 48hr total  - Please check troponin and CK Mb here  - continue ASA 81mg daily  - continue Plavix 75mg daily  - add atorvastatin 80mg daily  - continue metoprolol tartrate 50mg BID  - plan for cardiac catheterization with Dr. Nagy on Monday    2. Acute on chronic heart failure with EF 40-45%  - patient still appears volume overloaded here  - obtain repeat TTE   - continue diuretics with Lasix 40mg IV BID  - continue metoprolol tartrate 50mg BID, should transition to metoprolol succinate prior to discharge  - hold ACE/ARB in setting of RADHA vs CKD- baseline Cr 1.31 (5/24/21); likely progression of CKD  - Strict I/Os and daily standing weights  - replete electrolytes K>4, Mg>2  - add hydralazine 25 TID    3. HTN  - continue amlodipine 10mg  - ARB held in setting of RADHA vs CKD as above  - hydralazine as above      Lalo Mcginnis MD  Cardiology Resident    Plan discussed with cardiology fellow and resident.  Patient seen and examined.  Hx., exam and labs as above.  I agree with the assessment and recommendations, which I have reviewed and edited where appropriate.  Neptali Daley M.D.  Cardiology Attending, Consult Service    For Cardiology consults and questions, all Cardiology service information can be found 24/7 on [email or web address] - use password: [email or web address] to log in.

## 2022-11-11 NOTE — PROVIDER CONTACT NOTE (CRITICAL VALUE NOTIFICATION) - SITUATION
trop  940
. pt is on Heparin drip at rate of 27.1 cc/hr started from [facility], pt has no s/s of bleeding.

## 2022-11-11 NOTE — PROGRESS NOTE ADULT - PROBLEM SELECTOR PLAN 6
Treated at OSH with broad spectrum abx for xray opacities but unclear if this represents true PNA vs. pulmonary edema. Repeat CXR 11/10 at OSH "decreased pulmonary edema. Underlying consolidation not excluded. No pneumothorax. Small right pleural effusion, trace left pleural effusion". Report for initial CXR unavailable.   - c/w CTX to complete 5 days (to end 11/12) Home basal 47u, bolus 10u  - c/w basal/bolus 35, 5  - TRACY/FS qac, qhs

## 2022-11-11 NOTE — PROGRESS NOTE ADULT - PROBLEM SELECTOR PLAN 2
TTE at OSH 11/9/22 showed LVEF 40-45%, LV systolic function was moderately decreased.   - GDMT: needs appropriate BB, ACE/ARB/ARNI when better optimized  - lasix 40 bid, titrate to maintain net negative 1-2L  - maintain strict I&Os  - daily weights (standing if tolerating)

## 2022-11-11 NOTE — CHART NOTE - NSCHARTNOTEFT_GEN_A_CORE
Informed by RN cardiac enzymes elevated.     Patient has been asymptomatic. Admitted for SOB due to HF in setting of ACS. Pending possible cath on Monday.   Per cards, troponin previously 8700, then 5,000 at OSH now 872.     D/W house cardiology- no need to trend cardiac enzymes as troponin is downtrending.     Leanna Chow   [id number] Informed by RN cardiac enzymes elevated.     Patient has been asymptomatic. Admitted for SOB due to HF in setting of ACS. Pending possible cath on Monday.   Per cards, troponin previously 8700, then 5,000 at OSH now 872.     D/W house cardiology- no need to trend cardiac enzymes as troponin is downtrending.   Attending  [place]issar aware.     Leanna Chow   [id number]

## 2022-11-12 LAB
ANION GAP SERPL CALC-SCNC: 14 MMOL/L — SIGNIFICANT CHANGE UP (ref 5–17)
APTT BLD: 57.3 SEC — HIGH (ref 27.5–35.5)
APTT BLD: 64 SEC — HIGH (ref 27.5–35.5)
BUN SERPL-MCNC: 67 MG/DL — HIGH (ref 7–23)
CALCIUM SERPL-MCNC: 8.7 MG/DL — SIGNIFICANT CHANGE UP (ref 8.4–10.5)
CHLORIDE SERPL-SCNC: 99 MMOL/L — SIGNIFICANT CHANGE UP (ref 96–108)
CO2 SERPL-SCNC: 21 MMOL/L — LOW (ref 22–31)
CREAT SERPL-MCNC: 2.05 MG/DL — HIGH (ref 0.5–1.3)
EGFR: 36 ML/MIN/1.73M2 — LOW
GLUCOSE BLDC GLUCOMTR-MCNC: 215 MG/DL — HIGH (ref 70–99)
GLUCOSE BLDC GLUCOMTR-MCNC: 226 MG/DL — HIGH (ref 70–99)
GLUCOSE BLDC GLUCOMTR-MCNC: 237 MG/DL — HIGH (ref 70–99)
GLUCOSE BLDC GLUCOMTR-MCNC: 296 MG/DL — HIGH (ref 70–99)
GLUCOSE SERPL-MCNC: 244 MG/DL — HIGH (ref 70–99)
HCT VFR BLD CALC: 30.9 % — LOW (ref 39–50)
HGB BLD-MCNC: 10.2 G/DL — LOW (ref 13–17)
MCHC RBC-ENTMCNC: 30.9 PG — SIGNIFICANT CHANGE UP (ref 27–34)
MCHC RBC-ENTMCNC: 33 GM/DL — SIGNIFICANT CHANGE UP (ref 32–36)
MCV RBC AUTO: 93.6 FL — SIGNIFICANT CHANGE UP (ref 80–100)
NRBC # BLD: 0 /100 WBCS — SIGNIFICANT CHANGE UP (ref 0–0)
PLATELET # BLD AUTO: 215 K/UL — SIGNIFICANT CHANGE UP (ref 150–400)
POTASSIUM SERPL-MCNC: 4.3 MMOL/L — SIGNIFICANT CHANGE UP (ref 3.5–5.3)
POTASSIUM SERPL-SCNC: 4.3 MMOL/L — SIGNIFICANT CHANGE UP (ref 3.5–5.3)
RBC # BLD: 3.3 M/UL — LOW (ref 4.2–5.8)
RBC # FLD: 13.1 % — SIGNIFICANT CHANGE UP (ref 10.3–14.5)
SODIUM SERPL-SCNC: 134 MMOL/L — LOW (ref 135–145)
UUN UR-MCNC: 706 MG/DL — SIGNIFICANT CHANGE UP
WBC # BLD: 10.68 K/UL — HIGH (ref 3.8–10.5)
WBC # FLD AUTO: 10.68 K/UL — HIGH (ref 3.8–10.5)

## 2022-11-12 PROCEDURE — 99233 SBSQ HOSP IP/OBS HIGH 50: CPT

## 2022-11-12 PROCEDURE — 93010 ELECTROCARDIOGRAM REPORT: CPT

## 2022-11-12 RX ORDER — CHLORHEXIDINE GLUCONATE 213 G/1000ML
1 SOLUTION TOPICAL DAILY
Refills: 0 | Status: DISCONTINUED | OUTPATIENT
Start: 2022-11-12 | End: 2022-11-17

## 2022-11-12 RX ORDER — SENNA PLUS 8.6 MG/1
2 TABLET ORAL AT BEDTIME
Refills: 0 | Status: DISCONTINUED | OUTPATIENT
Start: 2022-11-12 | End: 2022-11-17

## 2022-11-12 RX ADMIN — CLOPIDOGREL BISULFATE 75 MILLIGRAM(S): 75 TABLET, FILM COATED ORAL at 11:53

## 2022-11-12 RX ADMIN — ATORVASTATIN CALCIUM 80 MILLIGRAM(S): 80 TABLET, FILM COATED ORAL at 21:47

## 2022-11-12 RX ADMIN — Medication 40 MILLIGRAM(S): at 05:34

## 2022-11-12 RX ADMIN — HEPARIN SODIUM 1200 UNIT(S)/HR: 5000 INJECTION INTRAVENOUS; SUBCUTANEOUS at 04:52

## 2022-11-12 RX ADMIN — Medication 50 MILLIGRAM(S): at 05:33

## 2022-11-12 RX ADMIN — Medication 2: at 08:20

## 2022-11-12 RX ADMIN — Medication 5 UNIT(S): at 12:08

## 2022-11-12 RX ADMIN — Medication 50 MILLIGRAM(S): at 17:03

## 2022-11-12 RX ADMIN — INSULIN GLARGINE 35 UNIT(S): 100 INJECTION, SOLUTION SUBCUTANEOUS at 22:01

## 2022-11-12 RX ADMIN — Medication 81 MILLIGRAM(S): at 11:53

## 2022-11-12 RX ADMIN — HEPARIN SODIUM 1200 UNIT(S)/HR: 5000 INJECTION INTRAVENOUS; SUBCUTANEOUS at 07:15

## 2022-11-12 RX ADMIN — Medication 40 MILLIGRAM(S): at 17:03

## 2022-11-12 RX ADMIN — CEFTRIAXONE 100 MILLIGRAM(S): 500 INJECTION, POWDER, FOR SOLUTION INTRAMUSCULAR; INTRAVENOUS at 11:59

## 2022-11-12 RX ADMIN — Medication 2: at 16:40

## 2022-11-12 RX ADMIN — HEPARIN SODIUM 1200 UNIT(S)/HR: 5000 INJECTION INTRAVENOUS; SUBCUTANEOUS at 11:51

## 2022-11-12 RX ADMIN — Medication 1: at 22:01

## 2022-11-12 RX ADMIN — Medication 50 MILLIGRAM(S): at 21:47

## 2022-11-12 RX ADMIN — AMLODIPINE BESYLATE 10 MILLIGRAM(S): 2.5 TABLET ORAL at 05:33

## 2022-11-12 RX ADMIN — Medication 5 UNIT(S): at 08:19

## 2022-11-12 RX ADMIN — SENNA PLUS 2 TABLET(S): 8.6 TABLET ORAL at 22:18

## 2022-11-12 RX ADMIN — CHLORHEXIDINE GLUCONATE 1 APPLICATION(S): 213 SOLUTION TOPICAL at 11:00

## 2022-11-12 RX ADMIN — Medication 50 MILLIGRAM(S): at 14:31

## 2022-11-12 RX ADMIN — Medication 2: at 12:08

## 2022-11-12 RX ADMIN — Medication 5 UNIT(S): at 16:40

## 2022-11-12 NOTE — PROGRESS NOTE ADULT - PROBLEM SELECTOR PLAN 1
NSTEMI at [facility], trops 8700->5000, started on asa/plavix/heparin gtt 11/9/22  - c/w heparin gtt, aspirin, plavix   - cardiology consult  - timing of cath pending improvement of volume status  - stat ekg/cardiac enzymes if pt experiences new chest pain

## 2022-11-12 NOTE — PROGRESS NOTE ADULT - ASSESSMENT
62M with HTN, HLD, T2DM transfer from [facility] for NSTEMI complicated by heart failure exacerbation.

## 2022-11-12 NOTE — PROGRESS NOTE ADULT - SUBJECTIVE AND OBJECTIVE BOX
Patient seen and examined at bedside.    Overnight Events: NAEON    Review Of Systems: No chest pain, shortness of breath, or palpitations            Current Meds:  amLODIPine   Tablet 10 milliGRAM(s) Oral daily  aspirin enteric coated 81 milliGRAM(s) Oral daily  atorvastatin 80 milliGRAM(s) Oral at bedtime  cefTRIAXone   IVPB      cefTRIAXone   IVPB 1000 milliGRAM(s) IV Intermittent every 24 hours  chlorhexidine 2% Cloths 1 Application(s) Topical daily  clopidogrel Tablet 75 milliGRAM(s) Oral daily  dextrose 5%. 1000 milliLiter(s) IV Continuous <Continuous>  dextrose 5%. 1000 milliLiter(s) IV Continuous <Continuous>  dextrose 50% Injectable 25 Gram(s) IV Push once  dextrose 50% Injectable 12.5 Gram(s) IV Push once  dextrose 50% Injectable 25 Gram(s) IV Push once  dextrose Oral Gel 15 Gram(s) Oral once PRN  furosemide   Injectable 40 milliGRAM(s) IV Push two times a day  glucagon  Injectable 1 milliGRAM(s) IntraMuscular once  heparin   Injectable 6000 Unit(s) IV Push every 6 hours PRN  heparin  Infusion.  Unit(s)/Hr IV Continuous <Continuous>  hydrALAZINE 50 milliGRAM(s) Oral three times a day  insulin glargine Injectable (LANTUS) 35 Unit(s) SubCutaneous at bedtime  insulin lispro (ADMELOG) corrective regimen sliding scale   SubCutaneous three times a day before meals  insulin lispro (ADMELOG) corrective regimen sliding scale   SubCutaneous at bedtime  insulin lispro Injectable (ADMELOG) 5 Unit(s) SubCutaneous three times a day before meals  metoprolol tartrate 50 milliGRAM(s) Oral two times a day      Vitals:  T(F): 99.3 (11-12), Max: 99.3 (11-12)  HR: 66 (11-12) (63 - 83)  BP: 155/73 (11-12) (153/76 - 172/67)  RR: 18 (11-12)  SpO2: 95% (11-12)  I&O's Summary    11 Nov 2022 07:01  -  12 Nov 2022 07:00  --------------------------------------------------------  IN: 240 mL / OUT: 0 mL / NET: 240 mL    12 Nov 2022 07:01  -  12 Nov 2022 10:47  --------------------------------------------------------  IN: 240 mL / OUT: 0 mL / NET: 240 mL        Physical Exam:  Appearance: No acute distress; well appearing  Eyes: PERRL, EOMI, pink conjunctiva  HEENT: Normal oral mucosa  Cardiovascular: RRR, S1, S2, no murmurs, rubs, or gallops; no edema; no JVD  Respiratory: Clear to auscultation bilaterally  Gastrointestinal: soft, non-tender, non-distended with normal bowel sounds  Musculoskeletal: No clubbing; no joint deformity   Neurologic: Non-focal  Lymphatic: No lymphadenopathy  Psychiatry: AAOx3, mood & affect appropriate  Skin: No rashes, ecchymoses, or cyanosis                          10.2   10.68 )-----------( 215      ( 12 Nov 2022 07:22 )             30.9     11-12    134<L>  |  99  |  67<H>  ----------------------------<  244<H>  4.3   |  21<L>  |  2.05<H>    Ca    8.7      12 Nov 2022 07:22    TPro  7.4  /  Alb  3.7  /  TBili  0.8  /  DBili  x   /  AST  20  /  ALT  26  /  AlkPhos  65  11-11    PT/INR - ( 11 Nov 2022 12:43 )   PT: 13.9 sec;   INR: 1.20 ratio         PTT - ( 12 Nov 2022 04:05 )  PTT:64.0 sec  CARDIAC MARKERS ( 11 Nov 2022 16:09 )  872 ng/L / x     / x     / 454 U/L / x     / 5.0 ng/mL

## 2022-11-12 NOTE — PROGRESS NOTE ADULT - PROBLEM SELECTOR PLAN 3
RADHA, baseline Cr 1.2, has had RADHA to 2.2 before at [facility]. Favor cardiorenal syndrome i/s/o HF exacerbation  - f/u UA, renal/bladder ultrasound - proteinuria, no hydronephrosis  - Obtain FEUrea - prerenal etiology  - diuresis as above  - trend cr  - avoid nephrotoxic medications, nsaid  - renally dose meds

## 2022-11-12 NOTE — PROGRESS NOTE ADULT - ASSESSMENT
61 yo M with hx of CAD, HTN, DM presented from [facility] for NSTEMI and ADHF.     #NSTEMI  - currently denies CP, trop downtrended at OSH  - can stop heparin gtt after 48 hrs  - c/w asa 81 and plavix 75 daily  - c/w atorvastatin 80 daily  - c/w metoprolol tartrate 50 BID  - plan for [facility] on Monday    #ADHF  - TTE showed  63 yo M with hx of CAD, HTN, DM presented from [facility] for NSTEMI and ADHF.     #NSTEMI  - currently denies CP, trop downtrended at OSH  - can stop heparin gtt after 48 hrs  - c/w asa 81 and plavix 75 daily  - c/w atorvastatin 80 daily  - c/w metoprolol tartrate 50 BID  - plan for [facility] on Monday    #ADHF  - TTE pending  - has a hx of EF 40-45%  - c/w IV lasix 40 BID for goal net -1L today  - keep accurate I/O, daily weights  - hold ACEi/ARB for now  - can increase hydralazine to 75 TID if BP continues to be elevated

## 2022-11-12 NOTE — PROGRESS NOTE ADULT - SUBJECTIVE AND OBJECTIVE BOX
[facility] Division of Hospital Medicine  Ehsan Weiss MD  Available via MS Teams    SUBJECTIVE / OVERNIGHT EVENTS: No acute events overnight. Pt seen and examined at bedside. Denies any chest pain or SOB. Urinating frequently.     ADDITIONAL REVIEW OF SYSTEMS:    MEDICATIONS  (STANDING):  amLODIPine   Tablet 10 milliGRAM(s) Oral daily  aspirin enteric coated 81 milliGRAM(s) Oral daily  atorvastatin 80 milliGRAM(s) Oral at bedtime  cefTRIAXone   IVPB      cefTRIAXone   IVPB 1000 milliGRAM(s) IV Intermittent every 24 hours  chlorhexidine 2% Cloths 1 Application(s) Topical daily  clopidogrel Tablet 75 milliGRAM(s) Oral daily  dextrose 5%. 1000 milliLiter(s) (50 mL/Hr) IV Continuous <Continuous>  dextrose 5%. 1000 milliLiter(s) (100 mL/Hr) IV Continuous <Continuous>  dextrose 50% Injectable 25 Gram(s) IV Push once  dextrose 50% Injectable 12.5 Gram(s) IV Push once  dextrose 50% Injectable 25 Gram(s) IV Push once  furosemide   Injectable 40 milliGRAM(s) IV Push two times a day  glucagon  Injectable 1 milliGRAM(s) IntraMuscular once  heparin  Infusion.  Unit(s)/Hr (10 mL/Hr) IV Continuous <Continuous>  hydrALAZINE 50 milliGRAM(s) Oral three times a day  insulin glargine Injectable (LANTUS) 35 Unit(s) SubCutaneous at bedtime  insulin lispro (ADMELOG) corrective regimen sliding scale   SubCutaneous three times a day before meals  insulin lispro (ADMELOG) corrective regimen sliding scale   SubCutaneous at bedtime  insulin lispro Injectable (ADMELOG) 5 Unit(s) SubCutaneous three times a day before meals  metoprolol tartrate 50 milliGRAM(s) Oral two times a day    MEDICATIONS  (PRN):  dextrose Oral Gel 15 Gram(s) Oral once PRN Blood Glucose LESS THAN 70 milliGRAM(s)/deciliter  heparin   Injectable 6000 Unit(s) IV Push every 6 hours PRN For aPTT less than 40      I&O's Summary    2022 07:01  -  2022 07:00  --------------------------------------------------------  IN: 240 mL / OUT: 0 mL / NET: 240 mL    2022 07:01  -  2022 09:56  --------------------------------------------------------  IN: 240 mL / OUT: 0 mL / NET: 240 mL        T(F): 99.3 (22 @ 05:02), Max: 99.3 (22 @ 05:02)  HR: 66 (22 @ 05:02) (63 - 83)  BP: 155/73 (22 @ 05:02) (153/76 - 172/67)  RR: 18 (22 @ 05:02) (18 - 18)  SpO2: 95% (22 @ 05:02) (94% - 95%)    CONSTITUTIONAL: NAD, sitting up eating lunch  NECK: Supple, no palpable masses; no thyromegaly  RESPIRATORY: CTABL; no wheezes or crackles  CARDIOVASCULAR: normal S1 and S2, no murmur/rub/gallop; 2+ pitting edema b/l symmetric   ABDOMEN: soft, nt, nd   MUSCULOSKELETAL:  no clubbing or cyanosis of digits; no joint swelling or tenderness to palpation  PSYCH: A+O to person, place, and time; affect appropriate  NEUROLOGY: CN 2-12 are intact and symmetric; no gross sensory deficits   SKIN: b/l LE venous stasis changes    LABS:                        10.2   10.68 )-----------( 215      ( 2022 07:22 )             30.9     12    134<L>  |  99  |  67<H>  ----------------------------<  244<H>  4.3   |  21<L>  |  2.05<H>    Ca    8.7      2022 07:22    TPro  7.4  /  Alb  3.7  /  TBili  0.8  /  DBili  x   /  AST  20  /  ALT  26  /  AlkPhos  65  11-11    PT/INR - ( 2022 12:43 )   PT: 13.9 sec;   INR: 1.20 ratio         PTT - ( 2022 04:05 )  PTT:64.0 sec  CARDIAC MARKERS ( 2022 16:09 )  x     / x     / 454 U/L / x     / 5.0 ng/mL      Urinalysis Basic - ( 2022 20:33 )    Color: Light Yellow / Appearance: Clear / S.015 / pH: x  Gluc: x / Ketone: Negative  / Bili: Negative / Urobili: Negative   Blood: x / Protein: 300 mg/dL / Nitrite: Negative   Leuk Esterase: Negative / RBC: 5 /hpf / WBC 2 /HPF   Sq Epi: x / Non Sq Epi: 0 /hpf / Bacteria: Negative        COVID-19 PCR: NotDetec (2022 12:41)      RADIOLOGY & ADDITIONAL TESTS:  New Results Reviewed Today:   New Imaging Personally Reviewed Today:  New Electrocardiogram Personally Reviewed Today: Tele sinus 50-80s, no ectopy  Prior or Outpatient Records Reviewed Today:    COMMUNICATION:  Care Discussed with Consultants/Other Providers and Details of Discussion: Discussed with ACP  Discussions with Patient/Family:  PCP Communication: [facility] Division of Hospital Medicine  Ehsan Weiss MD  Available via MS Teams    SUBJECTIVE / OVERNIGHT EVENTS: No acute events overnight. Pt seen and examined at bedside. Denies any chest pain or SOB. Urinating frequently.     ADDITIONAL REVIEW OF SYSTEMS:    MEDICATIONS  (STANDING):  amLODIPine   Tablet 10 milliGRAM(s) Oral daily  aspirin enteric coated 81 milliGRAM(s) Oral daily  atorvastatin 80 milliGRAM(s) Oral at bedtime  cefTRIAXone   IVPB      cefTRIAXone   IVPB 1000 milliGRAM(s) IV Intermittent every 24 hours  chlorhexidine 2% Cloths 1 Application(s) Topical daily  clopidogrel Tablet 75 milliGRAM(s) Oral daily  dextrose 5%. 1000 milliLiter(s) (50 mL/Hr) IV Continuous <Continuous>  dextrose 5%. 1000 milliLiter(s) (100 mL/Hr) IV Continuous <Continuous>  dextrose 50% Injectable 25 Gram(s) IV Push once  dextrose 50% Injectable 12.5 Gram(s) IV Push once  dextrose 50% Injectable 25 Gram(s) IV Push once  furosemide   Injectable 40 milliGRAM(s) IV Push two times a day  glucagon  Injectable 1 milliGRAM(s) IntraMuscular once  heparin  Infusion.  Unit(s)/Hr (10 mL/Hr) IV Continuous <Continuous>  hydrALAZINE 50 milliGRAM(s) Oral three times a day  insulin glargine Injectable (LANTUS) 35 Unit(s) SubCutaneous at bedtime  insulin lispro (ADMELOG) corrective regimen sliding scale   SubCutaneous three times a day before meals  insulin lispro (ADMELOG) corrective regimen sliding scale   SubCutaneous at bedtime  insulin lispro Injectable (ADMELOG) 5 Unit(s) SubCutaneous three times a day before meals  metoprolol tartrate 50 milliGRAM(s) Oral two times a day    MEDICATIONS  (PRN):  dextrose Oral Gel 15 Gram(s) Oral once PRN Blood Glucose LESS THAN 70 milliGRAM(s)/deciliter  heparin   Injectable 6000 Unit(s) IV Push every 6 hours PRN For aPTT less than 40      I&O's Summary    2022 07:01  -  2022 07:00  --------------------------------------------------------  IN: 240 mL / OUT: 0 mL / NET: 240 mL    2022 07:01  -  2022 09:56  --------------------------------------------------------  IN: 240 mL / OUT: 0 mL / NET: 240 mL        T(F): 99.3 (22 @ 05:02), Max: 99.3 (22 @ 05:02)  HR: 66 (22 @ 05:02) (63 - 83)  BP: 155/73 (22 @ 05:02) (153/76 - 172/67)  RR: 18 (22 @ 05:02) (18 - 18)  SpO2: 95% (22 @ 05:02) (94% - 95%) SaO2 94% on RA    CONSTITUTIONAL: NAD, sitting up eating lunch  NECK: Supple, no palpable masses; no thyromegaly  RESPIRATORY: CTABL; no wheezes or crackles  CARDIOVASCULAR: normal S1 and S2, no murmur/rub/gallop; 2+ pitting edema b/l symmetric   ABDOMEN: soft, nt, nd   MUSCULOSKELETAL:  no clubbing or cyanosis of digits; no joint swelling or tenderness to palpation  PSYCH: A+O to person, place, and time; affect appropriate  NEUROLOGY: CN 2-12 are intact and symmetric; no gross sensory deficits   SKIN: b/l LE venous stasis changes    LABS:                        10.2   10.68 )-----------( 215      ( 2022 07:22 )             30.9     11-12    134<L>  |  99  |  67<H>  ----------------------------<  244<H>  4.3   |  21<L>  |  2.05<H>    Ca    8.7      2022 07:22    TPro  7.4  /  Alb  3.7  /  TBili  0.8  /  DBili  x   /  AST  20  /  ALT  26  /  AlkPhos  65  11-11    PT/INR - ( 2022 12:43 )   PT: 13.9 sec;   INR: 1.20 ratio         PTT - ( 2022 04:05 )  PTT:64.0 sec  CARDIAC MARKERS ( 2022 16:09 )  x     / x     / 454 U/L / x     / 5.0 ng/mL      Urinalysis Basic - ( 2022 20:33 )    Color: Light Yellow / Appearance: Clear / S.015 / pH: x  Gluc: x / Ketone: Negative  / Bili: Negative / Urobili: Negative   Blood: x / Protein: 300 mg/dL / Nitrite: Negative   Leuk Esterase: Negative / RBC: 5 /hpf / WBC 2 /HPF   Sq Epi: x / Non Sq Epi: 0 /hpf / Bacteria: Negative        COVID-19 PCR: NotDetec (2022 12:41)      RADIOLOGY & ADDITIONAL TESTS:  New Results Reviewed Today:   New Imaging Personally Reviewed Today:  New Electrocardiogram Personally Reviewed Today: Tele sinus 50-80s, no ectopy  Prior or Outpatient Records Reviewed Today:    COMMUNICATION:  Care Discussed with Consultants/Other Providers and Details of Discussion: Discussed with ACP  Discussions with Patient/Family:  PCP Communication:

## 2022-11-12 NOTE — PROGRESS NOTE ADULT - PROBLEM SELECTOR PLAN 4
- c/w amlodipine  - start ace/arb/arni when RADHA is improved/more euvolemic  - eventually stop amlodipine given b/l LE edema  - switch BB to carvedilol if further bp control required  - started on hydralazine 25 tid per cards Hypertensive on admission to OSH, continues to be hypertensive here  - c/w amlodipine  - start ace/arb/arni when RADHA is improved/more euvolemic  - eventually stop amlodipine given b/l LE edema  - switch BB to carvedilol if further bp control required  - started on hydralazine 25 tid per cards, titrate to normotension  - continue to monitor BP

## 2022-11-13 LAB
A1C WITH ESTIMATED AVERAGE GLUCOSE RESULT: 9.4 % — HIGH (ref 4–5.6)
ANION GAP SERPL CALC-SCNC: 14 MMOL/L — SIGNIFICANT CHANGE UP (ref 5–17)
APTT BLD: 38.1 SEC — HIGH (ref 27.5–35.5)
BUN SERPL-MCNC: 57 MG/DL — HIGH (ref 7–23)
CALCIUM SERPL-MCNC: 9.2 MG/DL — SIGNIFICANT CHANGE UP (ref 8.4–10.5)
CHLORIDE SERPL-SCNC: 101 MMOL/L — SIGNIFICANT CHANGE UP (ref 96–108)
CO2 SERPL-SCNC: 21 MMOL/L — LOW (ref 22–31)
CREAT SERPL-MCNC: 1.89 MG/DL — HIGH (ref 0.5–1.3)
EGFR: 39 ML/MIN/1.73M2 — LOW
ESTIMATED AVERAGE GLUCOSE: 223 MG/DL — HIGH (ref 68–114)
GLUCOSE BLDC GLUCOMTR-MCNC: 176 MG/DL — HIGH (ref 70–99)
GLUCOSE BLDC GLUCOMTR-MCNC: 184 MG/DL — HIGH (ref 70–99)
GLUCOSE BLDC GLUCOMTR-MCNC: 195 MG/DL — HIGH (ref 70–99)
GLUCOSE BLDC GLUCOMTR-MCNC: 242 MG/DL — HIGH (ref 70–99)
GLUCOSE SERPL-MCNC: 172 MG/DL — HIGH (ref 70–99)
HCT VFR BLD CALC: 30.5 % — LOW (ref 39–50)
HGB BLD-MCNC: 10.2 G/DL — LOW (ref 13–17)
MAGNESIUM SERPL-MCNC: 2.5 MG/DL — SIGNIFICANT CHANGE UP (ref 1.6–2.6)
MCHC RBC-ENTMCNC: 30.9 PG — SIGNIFICANT CHANGE UP (ref 27–34)
MCHC RBC-ENTMCNC: 33.4 GM/DL — SIGNIFICANT CHANGE UP (ref 32–36)
MCV RBC AUTO: 92.4 FL — SIGNIFICANT CHANGE UP (ref 80–100)
MRSA PCR RESULT.: DETECTED
NRBC # BLD: 0 /100 WBCS — SIGNIFICANT CHANGE UP (ref 0–0)
PHOSPHATE SERPL-MCNC: 4.1 MG/DL — SIGNIFICANT CHANGE UP (ref 2.5–4.5)
PLATELET # BLD AUTO: 226 K/UL — SIGNIFICANT CHANGE UP (ref 150–400)
POTASSIUM SERPL-MCNC: 4 MMOL/L — SIGNIFICANT CHANGE UP (ref 3.5–5.3)
POTASSIUM SERPL-SCNC: 4 MMOL/L — SIGNIFICANT CHANGE UP (ref 3.5–5.3)
RBC # BLD: 3.3 M/UL — LOW (ref 4.2–5.8)
RBC # FLD: 13.1 % — SIGNIFICANT CHANGE UP (ref 10.3–14.5)
S AUREUS DNA NOSE QL NAA+PROBE: DETECTED
SODIUM SERPL-SCNC: 136 MMOL/L — SIGNIFICANT CHANGE UP (ref 135–145)
WBC # BLD: 9.6 K/UL — SIGNIFICANT CHANGE UP (ref 3.8–10.5)
WBC # FLD AUTO: 9.6 K/UL — SIGNIFICANT CHANGE UP (ref 3.8–10.5)

## 2022-11-13 PROCEDURE — 99233 SBSQ HOSP IP/OBS HIGH 50: CPT

## 2022-11-13 RX ORDER — CARVEDILOL PHOSPHATE 80 MG/1
12.5 CAPSULE, EXTENDED RELEASE ORAL EVERY 12 HOURS
Refills: 0 | Status: DISCONTINUED | OUTPATIENT
Start: 2022-11-13 | End: 2022-11-15

## 2022-11-13 RX ORDER — BUMETANIDE 0.25 MG/ML
2 INJECTION INTRAMUSCULAR; INTRAVENOUS
Refills: 0 | Status: DISCONTINUED | OUTPATIENT
Start: 2022-11-13 | End: 2022-11-16

## 2022-11-13 RX ORDER — MUPIROCIN 20 MG/G
1 OINTMENT TOPICAL
Refills: 0 | Status: DISCONTINUED | OUTPATIENT
Start: 2022-11-13 | End: 2022-11-17

## 2022-11-13 RX ADMIN — ATORVASTATIN CALCIUM 80 MILLIGRAM(S): 80 TABLET, FILM COATED ORAL at 21:00

## 2022-11-13 RX ADMIN — AMLODIPINE BESYLATE 10 MILLIGRAM(S): 2.5 TABLET ORAL at 05:16

## 2022-11-13 RX ADMIN — Medication 1: at 11:58

## 2022-11-13 RX ADMIN — Medication 50 MILLIGRAM(S): at 05:16

## 2022-11-13 RX ADMIN — Medication 1: at 08:00

## 2022-11-13 RX ADMIN — Medication 40 MILLIGRAM(S): at 05:16

## 2022-11-13 RX ADMIN — Medication 5 UNIT(S): at 17:02

## 2022-11-13 RX ADMIN — CARVEDILOL PHOSPHATE 12.5 MILLIGRAM(S): 80 CAPSULE, EXTENDED RELEASE ORAL at 17:25

## 2022-11-13 RX ADMIN — Medication 50 MILLIGRAM(S): at 14:03

## 2022-11-13 RX ADMIN — INSULIN GLARGINE 35 UNIT(S): 100 INJECTION, SOLUTION SUBCUTANEOUS at 21:13

## 2022-11-13 RX ADMIN — CHLORHEXIDINE GLUCONATE 1 APPLICATION(S): 213 SOLUTION TOPICAL at 12:51

## 2022-11-13 RX ADMIN — HEPARIN SODIUM 6000 UNIT(S): 5000 INJECTION INTRAVENOUS; SUBCUTANEOUS at 07:06

## 2022-11-13 RX ADMIN — Medication 5 UNIT(S): at 07:59

## 2022-11-13 RX ADMIN — CLOPIDOGREL BISULFATE 75 MILLIGRAM(S): 75 TABLET, FILM COATED ORAL at 11:36

## 2022-11-13 RX ADMIN — MUPIROCIN 1 APPLICATION(S): 20 OINTMENT TOPICAL at 17:25

## 2022-11-13 RX ADMIN — HEPARIN SODIUM 1500 UNIT(S)/HR: 5000 INJECTION INTRAVENOUS; SUBCUTANEOUS at 07:02

## 2022-11-13 RX ADMIN — Medication 1: at 17:01

## 2022-11-13 RX ADMIN — Medication 50 MILLIGRAM(S): at 21:00

## 2022-11-13 RX ADMIN — BUMETANIDE 2 MILLIGRAM(S): 0.25 INJECTION INTRAMUSCULAR; INTRAVENOUS at 10:34

## 2022-11-13 RX ADMIN — Medication 5 UNIT(S): at 11:59

## 2022-11-13 RX ADMIN — Medication 81 MILLIGRAM(S): at 11:36

## 2022-11-13 RX ADMIN — HEPARIN SODIUM 1500 UNIT(S)/HR: 5000 INJECTION INTRAVENOUS; SUBCUTANEOUS at 07:21

## 2022-11-13 RX ADMIN — SENNA PLUS 2 TABLET(S): 8.6 TABLET ORAL at 21:00

## 2022-11-13 NOTE — PROGRESS NOTE ADULT - ASSESSMENT
62M with HTN, HLD, T2DM transfer from [facility] for NSTEMI complicated by heart failure exacerbation.

## 2022-11-13 NOTE — PROGRESS NOTE ADULT - PROBLEM SELECTOR PLAN 4
Hypertensive on admission to OSH, continues to be hypertensive here  - c/w amlodipine  - start ace/arb/arni when RADHA is improved/more euvolemic  - eventually stop amlodipine given b/l LE edema  - c/w hydralazine 50 tid per cards, titrate to normotension  - BP still persistently high, started carvedilol 12.5 bid 11/13  - continue to monitor BP

## 2022-11-13 NOTE — PROGRESS NOTE ADULT - SUBJECTIVE AND OBJECTIVE BOX
[facility] Division of Hospital Medicine  Ehsan Weiss MD  Available via MS Teams    SUBJECTIVE / OVERNIGHT EVENTS: No acute events overnight. Pt seen and examined at bedside. No outs recorded. Pt endorses orthopnea but no SOB or CORCORAN or chest pain.     ADDITIONAL REVIEW OF SYSTEMS:    MEDICATIONS  (STANDING):  amLODIPine   Tablet 10 milliGRAM(s) Oral daily  aspirin enteric coated 81 milliGRAM(s) Oral daily  atorvastatin 80 milliGRAM(s) Oral at bedtime  chlorhexidine 2% Cloths 1 Application(s) Topical daily  clopidogrel Tablet 75 milliGRAM(s) Oral daily  dextrose 5%. 1000 milliLiter(s) (50 mL/Hr) IV Continuous <Continuous>  dextrose 5%. 1000 milliLiter(s) (100 mL/Hr) IV Continuous <Continuous>  dextrose 50% Injectable 25 Gram(s) IV Push once  dextrose 50% Injectable 12.5 Gram(s) IV Push once  dextrose 50% Injectable 25 Gram(s) IV Push once  furosemide   Injectable 40 milliGRAM(s) IV Push two times a day  glucagon  Injectable 1 milliGRAM(s) IntraMuscular once  hydrALAZINE 50 milliGRAM(s) Oral three times a day  insulin glargine Injectable (LANTUS) 35 Unit(s) SubCutaneous at bedtime  insulin lispro (ADMELOG) corrective regimen sliding scale   SubCutaneous three times a day before meals  insulin lispro (ADMELOG) corrective regimen sliding scale   SubCutaneous at bedtime  insulin lispro Injectable (ADMELOG) 5 Unit(s) SubCutaneous three times a day before meals  metoprolol tartrate 50 milliGRAM(s) Oral two times a day  senna 2 Tablet(s) Oral at bedtime    MEDICATIONS  (PRN):  dextrose Oral Gel 15 Gram(s) Oral once PRN Blood Glucose LESS THAN 70 milliGRAM(s)/deciliter      I&O's Summary    2022 07:01  -  2022 07:00  --------------------------------------------------------  IN: 638 mL / OUT: 0 mL / NET: 638 mL        T(F): 97.6 (22 @ 05:13), Max: 98.6 (22 @ 01:00)  HR: 66 (22 @ 05:13) (66 - 84)  BP: 169/78 (22 @ 05:13) (156/70 - 183/78)  RR: 18 (22 @ 05:13) (18 - 18)  SpO2: 94% (22 @ 05:13) (94% - 94%)      CONSTITUTIONAL: NAD, sitting up eating lunch  NECK: Supple, no palpable masses; no thyromegaly  RESPIRATORY: CTABL; no wheezes or crackles  CARDIOVASCULAR: normal S1 and S2, no murmur/rub/gallop; 2+ pitting edema b/l symmetric   ABDOMEN: soft, nt, nd   MUSCULOSKELETAL:  no clubbing or cyanosis of digits; no joint swelling or tenderness to palpation  PSYCH: A+O to person, place, and time; affect appropriate  NEUROLOGY: CN 2-12 are intact and symmetric; no gross sensory deficits   SKIN: b/l LE venous stasis changes    LABS:                        10.2   9.60  )-----------( 226      ( 2022 05:58 )             30.5         136  |  101  |  57<H>  ----------------------------<  172<H>  4.0   |  21<L>  |  1.89<H>    Ca    9.2      2022 05:58    TPro  7.4  /  Alb  3.7  /  TBili  0.8  /  DBili  x   /  AST  20  /  ALT  26  /  AlkPhos  65  11-11    PT/INR - ( 2022 12:43 )   PT: 13.9 sec;   INR: 1.20 ratio         PTT - ( 2022 05:58 )  PTT:38.1 sec  CARDIAC MARKERS ( 2022 16:09 )  x     / x     / 454 U/L / x     / 5.0 ng/mL      Urinalysis Basic - ( 2022 20:33 )    Color: Light Yellow / Appearance: Clear / S.015 / pH: x  Gluc: x / Ketone: Negative  / Bili: Negative / Urobili: Negative   Blood: x / Protein: 300 mg/dL / Nitrite: Negative   Leuk Esterase: Negative / RBC: 5 /hpf / WBC 2 /HPF   Sq Epi: x / Non Sq Epi: 0 /hpf / Bacteria: Negative        COVID-19 PCR: NotDetec (2022 12:41)      RADIOLOGY & ADDITIONAL TESTS:  New Results Reviewed Today:   New Imaging Personally Reviewed Today:  New Electrocardiogram Personally Reviewed Today: Tele sinus 50-70s no ectopy  Prior or Outpatient Records Reviewed Today:    COMMUNICATION:  Care Discussed with Consultants/Other Providers and Details of Discussion: Discussed with ACP  Discussions with Patient/Family:  PCP Communication:

## 2022-11-13 NOTE — PROGRESS NOTE ADULT - PROBLEM SELECTOR PLAN 3
RADHA, baseline Cr 1.2, has had RADHA to 2.2 before at [facility]. Favor cardiorenal syndrome i/s/o HF exacerbation  - f/u UA, renal/bladder ultrasound - proteinuria, no hydronephrosis  - Obtain FEUrea - prerenal etiology  - diuresis as above  - trend cr  - avoid nephrotoxic medications, nsaid  - renally dose meds

## 2022-11-13 NOTE — PROGRESS NOTE ADULT - PROBLEM SELECTOR PLAN 2
TTE at OSH 11/9/22 showed LVEF 40-45%, LV systolic function was moderately decreased.   - GDMT: needs appropriate BB, ACE/ARB/ARNI when better optimized  - lasix 40 bid, titrate to maintain net negative 1-2L  - maintain strict I&Os  - daily weights (standing if tolerating) TTE at OSH 11/9/22 showed LVEF 40-45%, LV systolic function was moderately decreased.   - GDMT: needs appropriate BB, ACE/ARB/ARNI when better optimized  - still significantly fluid overloaded with lasix 40 bid -> switch to bumex 2 mg bid 11/13  - maintain strict I&Os  - daily weights (standing if tolerating)

## 2022-11-13 NOTE — PROGRESS NOTE ADULT - SUBJECTIVE AND OBJECTIVE BOX
Patient seen and examined at bedside.    Overnight Events:   No events. Denies CP, palpitations, SOB. Does endorse CORCORAN still when walking around room. Still with LE edema.     REVIEW OF SYSTEMS:  Constitutional:     [x ] negative [ ] fevers [ ] chills [ ] weight loss [ ] weight gain  HEENT:                  [x ] negative [ ] dry eyes [ ] eye irritation [ ] postnasal drip [ ] nasal congestion  CV:                         [ x] negative  [ ] chest pain [ ] orthopnea [ ] palpitations [ ] murmur  Resp:                     [ ] negative [ ] cough [ ] shortness of breath [x ] dyspnea [ ] wheezing [ ] sputum [ ]hemoptysis  GI:                          [x ] negative [ ] nausea [ ] vomiting [ ] diarrhea [ ] constipation [ ] abd pain [ ] dysphagia   :                        [ x] negative [ ] dysuria [ ] nocturia [ ] hematuria [ ] increased urinary frequency  Musculoskeletal: [x ] negative [ ] back pain [ ] myalgias [ ] arthralgias [ ] fracture  Skin:                       [ x] negative [ ] rash [ ] itch  Neurological:        [ x] negative [ ] headache [ ] dizziness [ ] syncope [ ] weakness [ ] numbness  Psychiatric:           [ x] negative [ ] anxiety [ ] depression  Endocrine:            [ x] negative [ ] diabetes [ ] thyroid problem  Heme/Lymph:      [ x] negative [ ] anemia [ ] bleeding problem  Allergic/Immune: [ x] negative [ ] itchy eyes [ ] nasal discharge [ ] hives [ ] angioedema    [ x] All other systems negative          Current Meds:  amLODIPine   Tablet 10 milliGRAM(s) Oral daily  aspirin enteric coated 81 milliGRAM(s) Oral daily  atorvastatin 80 milliGRAM(s) Oral at bedtime  buMETAnide Injectable 2 milliGRAM(s) IV Push two times a day  carvedilol 12.5 milliGRAM(s) Oral every 12 hours  chlorhexidine 2% Cloths 1 Application(s) Topical daily  clopidogrel Tablet 75 milliGRAM(s) Oral daily  dextrose 5%. 1000 milliLiter(s) IV Continuous <Continuous>  dextrose 5%. 1000 milliLiter(s) IV Continuous <Continuous>  dextrose 50% Injectable 25 Gram(s) IV Push once  dextrose 50% Injectable 12.5 Gram(s) IV Push once  dextrose 50% Injectable 25 Gram(s) IV Push once  dextrose Oral Gel 15 Gram(s) Oral once PRN  glucagon  Injectable 1 milliGRAM(s) IntraMuscular once  hydrALAZINE 50 milliGRAM(s) Oral three times a day  insulin glargine Injectable (LANTUS) 35 Unit(s) SubCutaneous at bedtime  insulin lispro (ADMELOG) corrective regimen sliding scale   SubCutaneous three times a day before meals  insulin lispro (ADMELOG) corrective regimen sliding scale   SubCutaneous at bedtime  insulin lispro Injectable (ADMELOG) 5 Unit(s) SubCutaneous three times a day before meals  senna 2 Tablet(s) Oral at bedtime      Vitals:  T(F): 97.6 (11-13), Max: 98.6 (11-13)  HR: 65 (11-13) (65 - 84)  BP: 166/78 (11-13) (156/70 - 183/78)  RR: 17 (11-13)  SpO2: 94% (11-13)  I&O's Summary    12 Nov 2022 07:01  -  13 Nov 2022 07:00  --------------------------------------------------------  IN: 638 mL / OUT: 0 mL / NET: 638 mL      PHYSICAL EXAM:  Appearance: No acute distress; well appearing  Eyes: PERRL, EOMI, pink conjunctiva  HEENT: Normal oral mucosa  Cardiovascular: RRR, S1, S2, no murmurs, rubs, or gallops; 2+ edema to thighs   Respiratory: Decreased BS on RLL  Gastrointestinal: soft, non-tender, non-distended with normal bowel sounds  Musculoskeletal: No clubbing; no joint deformity   Neurologic: Non-focal  Lymphatic: No lymphadenopathy  Psychiatry: AAOx3, mood & affect appropriate  Skin: No rashes, ecchymoses, or cyanosis                          10.2   9.60  )-----------( 226      ( 13 Nov 2022 05:58 )             30.5     11-13    136  |  101  |  57<H>  ----------------------------<  172<H>  4.0   |  21<L>  |  1.89<H>    Ca    9.2      13 Nov 2022 05:58    TPro  7.4  /  Alb  3.7  /  TBili  0.8  /  DBili  x   /  AST  20  /  ALT  26  /  AlkPhos  65  11-11    PT/INR - ( 11 Nov 2022 12:43 )   PT: 13.9 sec;   INR: 1.20 ratio         PTT - ( 13 Nov 2022 05:58 )  PTT:38.1 sec  CARDIAC MARKERS ( 11 Nov 2022 16:09 )  872 ng/L / x     / x     / 454 U/L / x     / 5.0 ng/mL              DIAGNOSTIC/IMAGING:  CXR initial showing congestion.

## 2022-11-13 NOTE — PROGRESS NOTE ADULT - PROBLEM SELECTOR PLAN 1
NSTEMI at [facility], trops 8700->5000, started on asa/plavix/heparin gtt 11/9/22  - c/w aspirin, plavix. s/p 48 hrs of heparin gtt  - cardiology consult appreciated  - timing of cath pending improvement of volume status  - stat ekg/cardiac enzymes if pt experiences new chest pain

## 2022-11-13 NOTE — PROGRESS NOTE ADULT - ASSESSMENT
62M hx of CAD, HTN, DM presents with ADHF and c/f NSTEMI.     #Acute decompensated HF  #Elevated troponin c/f NSTEMI - No CP, likely type 2 in setting of ADHF.  #RADHA on CKD?    Plan:  Continue DAPT for now, turn off heparin gtt  Continue Atorvastatin 80mg  Continue Coreg 12.5mg BID  Continue Bumex 2mg IV BID  Continue Hydralazine 50mg TID and Amlodipine 10mg as written  Please obtain daily standing weights along with strict I/O  Please replete K to 4-5, Mg 2-3.

## 2022-11-14 DIAGNOSIS — Z29.9 ENCOUNTER FOR PROPHYLACTIC MEASURES, UNSPECIFIED: ICD-10-CM

## 2022-11-14 LAB
ANION GAP SERPL CALC-SCNC: 13 MMOL/L — SIGNIFICANT CHANGE UP (ref 5–17)
BUN SERPL-MCNC: 56 MG/DL — HIGH (ref 7–23)
CALCIUM SERPL-MCNC: 9.5 MG/DL — SIGNIFICANT CHANGE UP (ref 8.4–10.5)
CHLORIDE SERPL-SCNC: 102 MMOL/L — SIGNIFICANT CHANGE UP (ref 96–108)
CO2 SERPL-SCNC: 21 MMOL/L — LOW (ref 22–31)
CREAT SERPL-MCNC: 1.92 MG/DL — HIGH (ref 0.5–1.3)
EGFR: 39 ML/MIN/1.73M2 — LOW
GLUCOSE BLDC GLUCOMTR-MCNC: 194 MG/DL — HIGH (ref 70–99)
GLUCOSE BLDC GLUCOMTR-MCNC: 213 MG/DL — HIGH (ref 70–99)
GLUCOSE BLDC GLUCOMTR-MCNC: 260 MG/DL — HIGH (ref 70–99)
GLUCOSE BLDC GLUCOMTR-MCNC: 266 MG/DL — HIGH (ref 70–99)
GLUCOSE SERPL-MCNC: 188 MG/DL — HIGH (ref 70–99)
HCT VFR BLD CALC: 31.5 % — LOW (ref 39–50)
HGB BLD-MCNC: 10.4 G/DL — LOW (ref 13–17)
MAGNESIUM SERPL-MCNC: 2.5 MG/DL — SIGNIFICANT CHANGE UP (ref 1.6–2.6)
MCHC RBC-ENTMCNC: 30.8 PG — SIGNIFICANT CHANGE UP (ref 27–34)
MCHC RBC-ENTMCNC: 33 GM/DL — SIGNIFICANT CHANGE UP (ref 32–36)
MCV RBC AUTO: 93.2 FL — SIGNIFICANT CHANGE UP (ref 80–100)
NRBC # BLD: 0 /100 WBCS — SIGNIFICANT CHANGE UP (ref 0–0)
PHOSPHATE SERPL-MCNC: 4.4 MG/DL — SIGNIFICANT CHANGE UP (ref 2.5–4.5)
PLATELET # BLD AUTO: 241 K/UL — SIGNIFICANT CHANGE UP (ref 150–400)
POTASSIUM SERPL-MCNC: 4.4 MMOL/L — SIGNIFICANT CHANGE UP (ref 3.5–5.3)
POTASSIUM SERPL-SCNC: 4.4 MMOL/L — SIGNIFICANT CHANGE UP (ref 3.5–5.3)
RBC # BLD: 3.38 M/UL — LOW (ref 4.2–5.8)
RBC # FLD: 13.2 % — SIGNIFICANT CHANGE UP (ref 10.3–14.5)
SODIUM SERPL-SCNC: 136 MMOL/L — SIGNIFICANT CHANGE UP (ref 135–145)
WBC # BLD: 9.71 K/UL — SIGNIFICANT CHANGE UP (ref 3.8–10.5)
WBC # FLD AUTO: 9.71 K/UL — SIGNIFICANT CHANGE UP (ref 3.8–10.5)

## 2022-11-14 PROCEDURE — 71045 X-RAY EXAM CHEST 1 VIEW: CPT | Mod: 26

## 2022-11-14 PROCEDURE — 99233 SBSQ HOSP IP/OBS HIGH 50: CPT | Mod: GC

## 2022-11-14 PROCEDURE — 93306 TTE W/DOPPLER COMPLETE: CPT | Mod: 26

## 2022-11-14 PROCEDURE — 99233 SBSQ HOSP IP/OBS HIGH 50: CPT

## 2022-11-14 RX ORDER — LABETALOL HCL 100 MG
5 TABLET ORAL ONCE
Refills: 0 | Status: COMPLETED | OUTPATIENT
Start: 2022-11-14 | End: 2022-11-14

## 2022-11-14 RX ORDER — INSULIN GLARGINE 100 [IU]/ML
40 INJECTION, SOLUTION SUBCUTANEOUS AT BEDTIME
Refills: 0 | Status: DISCONTINUED | OUTPATIENT
Start: 2022-11-14 | End: 2022-11-16

## 2022-11-14 RX ORDER — INSULIN LISPRO 100/ML
8 VIAL (ML) SUBCUTANEOUS
Refills: 0 | Status: DISCONTINUED | OUTPATIENT
Start: 2022-11-14 | End: 2022-11-15

## 2022-11-14 RX ORDER — HYDRALAZINE HCL 50 MG
75 TABLET ORAL THREE TIMES A DAY
Refills: 0 | Status: DISCONTINUED | OUTPATIENT
Start: 2022-11-14 | End: 2022-11-16

## 2022-11-14 RX ADMIN — BUMETANIDE 2 MILLIGRAM(S): 0.25 INJECTION INTRAMUSCULAR; INTRAVENOUS at 13:06

## 2022-11-14 RX ADMIN — Medication 2: at 17:56

## 2022-11-14 RX ADMIN — CLOPIDOGREL BISULFATE 75 MILLIGRAM(S): 75 TABLET, FILM COATED ORAL at 12:00

## 2022-11-14 RX ADMIN — Medication 5 MILLIGRAM(S): at 13:00

## 2022-11-14 RX ADMIN — Medication 1: at 22:19

## 2022-11-14 RX ADMIN — CARVEDILOL PHOSPHATE 12.5 MILLIGRAM(S): 80 CAPSULE, EXTENDED RELEASE ORAL at 05:09

## 2022-11-14 RX ADMIN — CHLORHEXIDINE GLUCONATE 1 APPLICATION(S): 213 SOLUTION TOPICAL at 13:10

## 2022-11-14 RX ADMIN — BUMETANIDE 2 MILLIGRAM(S): 0.25 INJECTION INTRAMUSCULAR; INTRAVENOUS at 05:10

## 2022-11-14 RX ADMIN — MUPIROCIN 1 APPLICATION(S): 20 OINTMENT TOPICAL at 05:08

## 2022-11-14 RX ADMIN — MUPIROCIN 1 APPLICATION(S): 20 OINTMENT TOPICAL at 17:57

## 2022-11-14 RX ADMIN — Medication 8 UNIT(S): at 17:56

## 2022-11-14 RX ADMIN — Medication 75 MILLIGRAM(S): at 13:10

## 2022-11-14 RX ADMIN — AMLODIPINE BESYLATE 10 MILLIGRAM(S): 2.5 TABLET ORAL at 05:08

## 2022-11-14 RX ADMIN — Medication 3: at 12:11

## 2022-11-14 RX ADMIN — Medication 75 MILLIGRAM(S): at 21:18

## 2022-11-14 RX ADMIN — Medication 5 UNIT(S): at 08:33

## 2022-11-14 RX ADMIN — Medication 50 MILLIGRAM(S): at 05:09

## 2022-11-14 RX ADMIN — Medication 81 MILLIGRAM(S): at 12:01

## 2022-11-14 RX ADMIN — ATORVASTATIN CALCIUM 80 MILLIGRAM(S): 80 TABLET, FILM COATED ORAL at 21:19

## 2022-11-14 RX ADMIN — Medication 5 UNIT(S): at 12:12

## 2022-11-14 RX ADMIN — CARVEDILOL PHOSPHATE 12.5 MILLIGRAM(S): 80 CAPSULE, EXTENDED RELEASE ORAL at 17:56

## 2022-11-14 RX ADMIN — INSULIN GLARGINE 40 UNIT(S): 100 INJECTION, SOLUTION SUBCUTANEOUS at 22:20

## 2022-11-14 RX ADMIN — Medication 1: at 08:33

## 2022-11-14 NOTE — PROGRESS NOTE ADULT - PROBLEM SELECTOR PLAN 1
NSTEMI at [facility], trops 8700->5000, started on asa/plavix/heparin gtt 11/9/22  - trop 872 here  - s/p hep gtt x 48 hours  - c/w ASA + Plavix  - c/w atorvastatin 80mg qHS  - Cardiology consult appreciated  - [facility] pending optimization of volume status and renal function  - f/u TTE - results pending

## 2022-11-14 NOTE — PROGRESS NOTE ADULT - PROBLEM SELECTOR PLAN 6
Home basal 47u, bolus 10u  - HbA1c 9.4%  - increase lantus to 40 units qHS  - increase pre-meal admelog to 8 units TID qAC, hold if NPO  - c/w sliding scale  - monitor FS qAC + qHS

## 2022-11-14 NOTE — PROGRESS NOTE ADULT - ASSESSMENT
63 yo male with PMH of HTN, HLD, T2DM who presents as transfer from [facility] for NSTEMI and CHF exacerbation with volume overload on exam.

## 2022-11-14 NOTE — PROGRESS NOTE ADULT - PROBLEM SELECTOR PLAN 2
TTE at OSH 11/9/22 showed LVEF 40-45%, LV systolic function was moderately decreased.   - GDMT: needs appropriate BB, ACE/ARB/ARNI when better optimized  - still hypervolemic on exam  - c/w bumex 2mg IV BID  - c/w carvedilol 12.5mg q12h  - increase hydralazine to 75mg TID  - strict I/Os, daily weights TTE at OSH 11/9/22 showed LVEF 40-45%, LV systolic function was moderately decreased.   - still hypervolemic on exam  - c/w bumex 2mg IV BID  - c/w carvedilol 12.5mg q12h  - increase hydralazine to 75mg TID  - strict I/Os, daily weights

## 2022-11-14 NOTE — RAPID RESPONSE TEAM SUMMARY - NSADDTLFINDINGSRRT_GEN_ALL_CORE
RRT called for tremors and new hypoxia on room air when pt returned from stress echocardiogram, pt received pharmacologic stress test agent 25 min prior to initiation of symptoms. Initial VS: 36.8C, 238/95, HR 93, RR 20, O2 sat 86%. BP improved to patient's baseline of -180s after labetalol 5mg IV push. Pt received hydralazine 75mg oral during rapid as scheduled per cardiology recs. Physical exam: no acute distress, no respiratory distress while pulse ox reading of 86%, b/l tremors improved with warming of extremities. Pt afebrile at this time. CXR obtained which showed no signs of fluid overload, possible hazy opacity over RML, primary team to f/u official radiology read. Primary team to also follow up official stress TTE. At this time, no indication to treat for infectious source, but primary team to follow close VS checks for signs of PNA given chills/tremors. Less likely PE given normal HR and temp, and no acute respiratory distress. Uptitrated NC to 6L, pt satting 94% upon end of RRT, appears comfortable and speaking in full sentences without pauses for breaths. At this time, pt has been stabilized to end RRT. Primary team to follow VS closely to monitor for signs of PNA/infection.

## 2022-11-14 NOTE — PROGRESS NOTE ADULT - SUBJECTIVE AND OBJECTIVE BOX
Patient seen and examined at bedside.    Overnight Events:     Review Of Systems: No chest pain, shortness of breath, or palpitations            Current Meds:  amLODIPine   Tablet 10 milliGRAM(s) Oral daily  aspirin enteric coated 81 milliGRAM(s) Oral daily  atorvastatin 80 milliGRAM(s) Oral at bedtime  buMETAnide Injectable 2 milliGRAM(s) IV Push two times a day  carvedilol 12.5 milliGRAM(s) Oral every 12 hours  chlorhexidine 2% Cloths 1 Application(s) Topical daily  clopidogrel Tablet 75 milliGRAM(s) Oral daily  dextrose 5%. 1000 milliLiter(s) IV Continuous <Continuous>  dextrose 5%. 1000 milliLiter(s) IV Continuous <Continuous>  dextrose 50% Injectable 25 Gram(s) IV Push once  dextrose 50% Injectable 12.5 Gram(s) IV Push once  dextrose 50% Injectable 25 Gram(s) IV Push once  dextrose Oral Gel 15 Gram(s) Oral once PRN  glucagon  Injectable 1 milliGRAM(s) IntraMuscular once  hydrALAZINE 50 milliGRAM(s) Oral three times a day  insulin glargine Injectable (LANTUS) 35 Unit(s) SubCutaneous at bedtime  insulin lispro (ADMELOG) corrective regimen sliding scale   SubCutaneous three times a day before meals  insulin lispro (ADMELOG) corrective regimen sliding scale   SubCutaneous at bedtime  insulin lispro Injectable (ADMELOG) 5 Unit(s) SubCutaneous three times a day before meals  mupirocin 2% Nasal 1 Application(s) Both Nostrils two times a day  senna 2 Tablet(s) Oral at bedtime      Vitals:  T(F): 97.8 (11-14), Max: 98.7 (11-13)  HR: 61 (11-14) (61 - 70)  BP: 170/72 (11-14) (166/78 - 179/73)  RR: 18 (11-14)  SpO2: 93% (11-14)  I&O's Summary    12 Nov 2022 07:01  -  13 Nov 2022 07:00  --------------------------------------------------------  IN: 638 mL / OUT: 0 mL / NET: 638 mL    13 Nov 2022 07:01  -  14 Nov 2022 06:42  --------------------------------------------------------  IN: 350 mL / OUT: 2875 mL / NET: -2525 mL        Physical Exam:  Appearance: No acute distress; well appearing  Eyes: PERRL, EOMI, pink conjunctiva  HEENT: Normal oral mucosa  Cardiovascular: RRR, S1, S2, no murmurs, rubs, or gallops; no edema; no JVD  Respiratory: Clear to auscultation bilaterally  Gastrointestinal: soft, non-tender, non-distended with normal bowel sounds  Musculoskeletal: No clubbing; no joint deformity   Neurologic: Non-focal  Lymphatic: No lymphadenopathy  Psychiatry: AAOx3, mood & affect appropriate  Skin: No rashes, ecchymoses, or cyanosis                          10.4   9.71  )-----------( 241      ( 14 Nov 2022 06:13 )             31.5     11-13    136  |  101  |  57<H>  ----------------------------<  172<H>  4.0   |  21<L>  |  1.89<H>    Ca    9.2      13 Nov 2022 05:58  Phos  4.1     11-13  Mg     2.5     11-13      PTT - ( 13 Nov 2022 05:58 )  PTT:38.1 sec  CARDIAC MARKERS ( 11 Nov 2022 16:09 )  872 ng/L / x     / x     / 454 U/L / x     / 5.0 ng/mL              New ECG(s): Personally reviewed    Echo:    Stress Testing:     Cath:    Imaging:    Interpretation of Telemetry:   Patient seen and examined at bedside.    Overnight Events: No acute overnight events. No CP, SOB, or palpitations. Pt reported walking around the floor yesterday, no CORCORAN.     Tele: sinus 60-90s    Current Meds:  amLODIPine   Tablet 10 milliGRAM(s) Oral daily  aspirin enteric coated 81 milliGRAM(s) Oral daily  atorvastatin 80 milliGRAM(s) Oral at bedtime  buMETAnide Injectable 2 milliGRAM(s) IV Push two times a day  carvedilol 12.5 milliGRAM(s) Oral every 12 hours  chlorhexidine 2% Cloths 1 Application(s) Topical daily  clopidogrel Tablet 75 milliGRAM(s) Oral daily  dextrose 5%. 1000 milliLiter(s) IV Continuous <Continuous>  dextrose 5%. 1000 milliLiter(s) IV Continuous <Continuous>  dextrose 50% Injectable 25 Gram(s) IV Push once  dextrose 50% Injectable 12.5 Gram(s) IV Push once  dextrose 50% Injectable 25 Gram(s) IV Push once  dextrose Oral Gel 15 Gram(s) Oral once PRN  glucagon  Injectable 1 milliGRAM(s) IntraMuscular once  hydrALAZINE 50 milliGRAM(s) Oral three times a day  insulin glargine Injectable (LANTUS) 35 Unit(s) SubCutaneous at bedtime  insulin lispro (ADMELOG) corrective regimen sliding scale   SubCutaneous three times a day before meals  insulin lispro (ADMELOG) corrective regimen sliding scale   SubCutaneous at bedtime  insulin lispro Injectable (ADMELOG) 5 Unit(s) SubCutaneous three times a day before meals  mupirocin 2% Nasal 1 Application(s) Both Nostrils two times a day  senna 2 Tablet(s) Oral at bedtime      Vitals:  T(F): 97.8 (11-14), Max: 98.7 (11-13)  HR: 61 (11-14) (61 - 70)  BP: 170/72 (11-14) (166/78 - 179/73)  RR: 18 (11-14)  SpO2: 93% (11-14)  I&O's Summary    12 Nov 2022 07:01  -  13 Nov 2022 07:00  --------------------------------------------------------  IN: 638 mL / OUT: 0 mL / NET: 638 mL    13 Nov 2022 07:01  -  14 Nov 2022 06:42  --------------------------------------------------------  IN: 350 mL / OUT: 2875 mL / NET: -2525 mL        Physical Exam:  Appearance: No acute distress; well appearing  Eyes: PERRL, EOMI, pink conjunctiva  HEENT: Normal oral mucosa  Cardiovascular: RRR, S1, S2, no murmurs, rubs, or gallops  Respiratory: Clear to auscultation bilaterally  Gastrointestinal: soft, non-tender, non-distended with normal bowel sounds  Musculoskeletal: No clubbing; no joint deformity   Neurologic: AAOx3, non-focal  Ext: 2+ b/l pitting edema to the knees. No rashes, ecchymoses, or cyanosis.                           10.4   9.71  )-----------( 241      ( 14 Nov 2022 06:13 )             31.5     11-13    136  |  101  |  57<H>  ----------------------------<  172<H>  4.0   |  21<L>  |  1.89<H>    Ca    9.2      13 Nov 2022 05:58  Phos  4.1     11-13  Mg     2.5     11-13      PTT - ( 13 Nov 2022 05:58 )  PTT:38.1 sec  CARDIAC MARKERS ( 11 Nov 2022 16:09 )  872 ng/L / x     / x     / 454 U/L / x     / 5.0 ng/mL          < from: Xray Chest 1 View- PORTABLE-Urgent (Xray Chest 1 View- PORTABLE-Urgent .) (11.11.22 @ 14:10) >  IMPRESSION:  Bilateral midlung field/basilar hazy opacities, right greater than left,   which likely represent moderate pulmonary edema however cannot exclude   right lower lung field pneumonia.   Patient seen and examined at bedside.    Overnight Events: No acute overnight events. No CP, SOB, or palpitations. Pt reported walking around the floor yesterday, no CORCORAN.     Tele: sinus 60-90s    Current Meds:  amLODIPine   Tablet 10 milliGRAM(s) Oral daily  aspirin enteric coated 81 milliGRAM(s) Oral daily  atorvastatin 80 milliGRAM(s) Oral at bedtime  buMETAnide Injectable 2 milliGRAM(s) IV Push two times a day  carvedilol 12.5 milliGRAM(s) Oral every 12 hours  chlorhexidine 2% Cloths 1 Application(s) Topical daily  clopidogrel Tablet 75 milliGRAM(s) Oral daily  dextrose 5%. 1000 milliLiter(s) IV Continuous <Continuous>  dextrose 5%. 1000 milliLiter(s) IV Continuous <Continuous>  dextrose 50% Injectable 25 Gram(s) IV Push once  dextrose 50% Injectable 12.5 Gram(s) IV Push once  dextrose 50% Injectable 25 Gram(s) IV Push once  dextrose Oral Gel 15 Gram(s) Oral once PRN  glucagon  Injectable 1 milliGRAM(s) IntraMuscular once  hydrALAZINE 50 milliGRAM(s) Oral three times a day  insulin glargine Injectable (LANTUS) 35 Unit(s) SubCutaneous at bedtime  insulin lispro (ADMELOG) corrective regimen sliding scale   SubCutaneous three times a day before meals  insulin lispro (ADMELOG) corrective regimen sliding scale   SubCutaneous at bedtime  insulin lispro Injectable (ADMELOG) 5 Unit(s) SubCutaneous three times a day before meals  mupirocin 2% Nasal 1 Application(s) Both Nostrils two times a day  senna 2 Tablet(s) Oral at bedtime      Vitals:  T(F): 97.8 (11-14), Max: 98.7 (11-13)  HR: 61 (11-14) (61 - 70)  BP: 170/72 (11-14) (166/78 - 179/73)  RR: 18 (11-14)  SpO2: 93% (11-14)  I&O's Summary    12 Nov 2022 07:01  -  13 Nov 2022 07:00  --------------------------------------------------------  IN: 638 mL / OUT: 0 mL / NET: 638 mL    13 Nov 2022 07:01  -  14 Nov 2022 06:42  --------------------------------------------------------  IN: 350 mL / OUT: 2875 mL / NET: -2525 mL        Physical Exam:  Appearance: No acute distress; well appearing  Eyes: PERRL, EOMI, pink conjunctiva  HEENT: Normal oral mucosa  Cardiovascular: RRR, S1, S2, no murmurs, rubs, or gallops. No JVP appreciated.   Respiratory: Clear to auscultation bilaterally  Gastrointestinal: soft, non-tender, non-distended with normal bowel sounds  Musculoskeletal: No clubbing; no joint deformity   Neurologic: AAOx3, non-focal  Ext: 2+ b/l pitting edema to the knees. No rashes, ecchymoses, or cyanosis.                           10.4   9.71  )-----------( 241      ( 14 Nov 2022 06:13 )             31.5     11-13    136  |  101  |  57<H>  ----------------------------<  172<H>  4.0   |  21<L>  |  1.89<H>    Ca    9.2      13 Nov 2022 05:58  Phos  4.1     11-13  Mg     2.5     11-13      PTT - ( 13 Nov 2022 05:58 )  PTT:38.1 sec  CARDIAC MARKERS ( 11 Nov 2022 16:09 )  872 ng/L / x     / x     / 454 U/L / x     / 5.0 ng/mL          < from: Xray Chest 1 View- PORTABLE-Urgent (Xray Chest 1 View- PORTABLE-Urgent .) (11.11.22 @ 14:10) >  IMPRESSION:  Bilateral midlung field/basilar hazy opacities, right greater than left,   which likely represent moderate pulmonary edema however cannot exclude   right lower lung field pneumonia.

## 2022-11-14 NOTE — CHART NOTE - NSCHARTNOTEFT_GEN_A_CORE
S/P RRT for RRT for Hypoxic resp failure - 86% on RA requiring 5-6L oxygen via NC, elevated B/P 200/80s,  tremors after Echo   Pt now titrated down on oxygen to 3L nasal canula  s/p Labetalol 5mg IV push and Hydralazine increased to 75mg TID  Blood pressure improved to 180/63  CXR Mildly increased hazy opacities in the right mid to lower lung  Will monitor for fevers and leukocytosis    [id number]

## 2022-11-14 NOTE — PROGRESS NOTE ADULT - SUBJECTIVE AND OBJECTIVE BOX
Kamila Shahid MD  Division of Hospital Medicine  [facility]   Available on [employer] Teams (Mon-Fri 8am-5pm)    * messages preferred prior to calls  Other Times:  [phone]      Patient is a 62y old  Male who presents with a chief complaint of nstemi (13 Nov 2022 10:31)      SUBJECTIVE / OVERNIGHT EVENTS: no acute events overnight, but RRT called after returning from TTE with shaking and hypoxia found to have SBP > 200s. seen shortly after RRT and feels improved. dyspnea significantly improved since admission with no active chest pain at time of my exam.  ADDITIONAL REVIEW OF SYSTEMS:  Tele reviewed: Sinus Rhythm with HR 60-70s    MEDICATIONS  (STANDING):  amLODIPine   Tablet 10 milliGRAM(s) Oral daily  aspirin enteric coated 81 milliGRAM(s) Oral daily  atorvastatin 80 milliGRAM(s) Oral at bedtime  buMETAnide Injectable 2 milliGRAM(s) IV Push two times a day  carvedilol 12.5 milliGRAM(s) Oral every 12 hours  chlorhexidine 2% Cloths 1 Application(s) Topical daily  clopidogrel Tablet 75 milliGRAM(s) Oral daily  dextrose 5%. 1000 milliLiter(s) (50 mL/Hr) IV Continuous <Continuous>  dextrose 5%. 1000 milliLiter(s) (100 mL/Hr) IV Continuous <Continuous>  dextrose 50% Injectable 25 Gram(s) IV Push once  dextrose 50% Injectable 12.5 Gram(s) IV Push once  dextrose 50% Injectable 25 Gram(s) IV Push once  glucagon  Injectable 1 milliGRAM(s) IntraMuscular once  hydrALAZINE 75 milliGRAM(s) Oral three times a day  insulin glargine Injectable (LANTUS) 35 Unit(s) SubCutaneous at bedtime  insulin lispro (ADMELOG) corrective regimen sliding scale   SubCutaneous three times a day before meals  insulin lispro (ADMELOG) corrective regimen sliding scale   SubCutaneous at bedtime  insulin lispro Injectable (ADMELOG) 8 Unit(s) SubCutaneous three times a day before meals  mupirocin 2% Nasal 1 Application(s) Both Nostrils two times a day  senna 2 Tablet(s) Oral at bedtime    MEDICATIONS  (PRN):  dextrose Oral Gel 15 Gram(s) Oral once PRN Blood Glucose LESS THAN 70 milliGRAM(s)/deciliter      CAPILLARY BLOOD GLUCOSE      POCT Blood Glucose.: 266 mg/dL (14 Nov 2022 12:07)  POCT Blood Glucose.: 194 mg/dL (14 Nov 2022 08:14)  POCT Blood Glucose.: 242 mg/dL (13 Nov 2022 21:08)  POCT Blood Glucose.: 176 mg/dL (13 Nov 2022 16:46)    I&O's Summary    13 Nov 2022 07:01  -  14 Nov 2022 07:00  --------------------------------------------------------  IN: 350 mL / OUT: 2875 mL / NET: -2525 mL        PHYSICAL EXAM:  Vital Signs Last 24 Hrs  T(C): 36.6 (14 Nov 2022 04:25), Max: 37.1 (13 Nov 2022 20:40)  T(F): 97.8 (14 Nov 2022 04:25), Max: 98.7 (13 Nov 2022 20:40)  HR: 61 (14 Nov 2022 04:25) (61 - 70)  BP: 170/72 (14 Nov 2022 04:25) (167/71 - 178/70)  BP(mean): --  RR: 18 (14 Nov 2022 04:25) (18 - 18)  SpO2: 93% (14 Nov 2022 04:25) (93% - 93%)    Parameters below as of 14 Nov 2022 04:25  Patient On (Oxygen Delivery Method): room air    CONSTITUTIONAL: NAD, well-developed, well-groomed  EYES: PERRLA; conjunctiva and sclera clear  ENMT: Moist oral mucosa, no pharyngeal injection or exudates; normal dentition  NECK: Supple, no palpable masses; +JVD  RESPIRATORY: Normal respiratory effort; crackles b/l R>L  CARDIOVASCULAR: Regular rate and rhythm, normal S1 and S2, no murmur/rub/gallop; 2+ pitting lower extremity edema b/l; Peripheral pulses are 2+ bilaterally  ABDOMEN: Soft, Nondistended, Nontender to palpation, normoactive bowel sounds  MUSCULOSKELETAL:  No clubbing or cyanosis of digits; no joint swelling or tenderness to palpation  PSYCH: A+O to person, place, and time; affect appropriate  NEUROLOGY: CN 2-12 are intact and symmetric; no gross sensory deficits   SKIN: No rashes; no palpable lesions    LABS:                        10.4   9.71  )-----------( 241      ( 14 Nov 2022 06:13 )             31.5     11-14    136  |  102  |  56<H>  ----------------------------<  188<H>  4.4   |  21<L>  |  1.92<H>    Ca    9.5      14 Nov 2022 06:13  Phos  4.4     11-14  Mg     2.5     11-14      PTT - ( 13 Nov 2022 05:58 )  PTT:38.1 sec      RADIOLOGY & ADDITIONAL TESTS:  Results Reviewed: no leukocytosis, H/H stable, Cr slightly uptrended  Imaging Personally Reviewed:  11/14/22 CXR:  IMPRESSION:  Mildly increased hazy opacities in the right mid to lower lung with   improvement in the left lung.    Electrocardiogram Personally Reviewed:    COORDINATION OF CARE:  Care Discussed with Consultants/Other Providers [Y]: medicine TRELL Bang  Prior or Outpatient Records Reviewed [Y/N]:

## 2022-11-14 NOTE — PROGRESS NOTE ADULT - ASSESSMENT
63 yo M with PMH of T2DM, HTN, CAD. Transfer from [facility] for NSTEMI and management of acute decompensated HF. Pt has been undergoing diuresis with improvement of symptoms, no SOB but still significant leg edema.     REC:  1. NSTEMI  - patient had troponin 8700->5000 at OSH, started on ASA/Plavix/Heparin gtt since 11/9/2022 per chart  - continue ASA 81mg daily  - continue Plavix 75mg daily  - continue atorvastatin 80mg daily  - plan for cardiac catheterization with Dr. Nagy on today or tomorrow    2. Acute on chronic heart failure with EF 40-45%  - patient still appears volume overloaded here  - repeat TTE (pending)   - continue diuretics with bumex 2mg IV bid  - continue hydralazine 50mg tid  - continue carvedilol 12.5mg bid  - Strict I/Os and daily standing weights  - replete electrolytes K>4, Mg>2    3. HTN  - continue amlodipine 10mg  - continue hydralazine 50mg tid 61 yo M with PMH of T2DM, HTN, CAD. Transfer from [facility] for NSTEMI and management of acute decompensated HF. Pt has been undergoing diuresis with improvement of symptoms, no SOB but still significant leg edema.     REC:  1. NSTEMI  - patient had troponin 8700->5000 at OSH, started on ASA/Plavix/Heparin gtt since 11/9/2022 per chart  - continue ASA 81mg daily  - continue Plavix 75mg daily  - continue atorvastatin 80mg daily  - plan for cardiac catheterization with Dr. Nagy on today or tomorrow    2. Acute on chronic heart failure with EF 40-45%  - repeat TTE (pending)   - continue diuretics with bumex 2mg IV bid  - increase hydralazine 75mg tid  - continue carvedilol 12.5mg bid  - Strict I/Os and daily standing weights  - replete electrolytes K>4, Mg>2    3. HTN  - continue amlodipine 10mg  - increase hydralazine 75mg tid    Haroldo Haddad, MS3

## 2022-11-14 NOTE — PROGRESS NOTE ADULT - PROBLEM SELECTOR PLAN 4
uncontrolled hypertension.  - c/w amlodipine 10mg daily   - home losartan 50mg daily on hold given RADHA  - c/w carvedilol 12.5mg q12h  - increase hydralazine to 75mg TID  - continue to monitor BP

## 2022-11-14 NOTE — PROVIDER CONTACT NOTE (OTHER) - BACKGROUND
62 yr old  male with PMHx of DMT2 (Hgba1c 6.0, pt follows [facility]), HTN, CAD presents with SOB difficulty breathing

## 2022-11-14 NOTE — PROGRESS NOTE ADULT - PROBLEM SELECTOR PLAN 3
RADHA, baseline Cr 1.2, has had RADHA to 2.2 before at [facility]. Favor cardiorenal syndrome i/s/o HF exacerbation  - f/u UA, renal/bladder ultrasound - proteinuria, no hydronephrosis  - Obtain FEUrea - prerenal etiology  - diuresis as above  - trend cr  - avoid nephrotoxic medications, nsaid  - renally dose meds

## 2022-11-14 NOTE — RAPID RESPONSE TEAM SUMMARY - NSSITUATIONBACKGROUNDRRT_GEN_ALL_CORE
63 yo M with PMH of T2DM, HTN, CAD. Transfer from [facility] for NSTEMI and management of acute decompensated HF. Pt has been undergoing diuresis with improvement of symptoms, no SOB, chronic leg edema improving from admission.

## 2022-11-14 NOTE — PROVIDER CONTACT NOTE (OTHER) - ASSESSMENT
Patient hypoxic to 86% on RA. Temp 98.2 oral. Warm to touch. Visible tremors in all extremeties present.

## 2022-11-15 LAB
ANION GAP SERPL CALC-SCNC: 11 MMOL/L — SIGNIFICANT CHANGE UP (ref 5–17)
BUN SERPL-MCNC: 55 MG/DL — HIGH (ref 7–23)
CALCIUM SERPL-MCNC: 9.2 MG/DL — SIGNIFICANT CHANGE UP (ref 8.4–10.5)
CHLORIDE SERPL-SCNC: 100 MMOL/L — SIGNIFICANT CHANGE UP (ref 96–108)
CO2 SERPL-SCNC: 24 MMOL/L — SIGNIFICANT CHANGE UP (ref 22–31)
CREAT SERPL-MCNC: 1.95 MG/DL — HIGH (ref 0.5–1.3)
EGFR: 38 ML/MIN/1.73M2 — LOW
GLUCOSE BLDC GLUCOMTR-MCNC: 147 MG/DL — HIGH (ref 70–99)
GLUCOSE BLDC GLUCOMTR-MCNC: 168 MG/DL — HIGH (ref 70–99)
GLUCOSE BLDC GLUCOMTR-MCNC: 200 MG/DL — HIGH (ref 70–99)
GLUCOSE BLDC GLUCOMTR-MCNC: 216 MG/DL — HIGH (ref 70–99)
GLUCOSE SERPL-MCNC: 203 MG/DL — HIGH (ref 70–99)
HCT VFR BLD CALC: 30.8 % — LOW (ref 39–50)
HGB BLD-MCNC: 10.2 G/DL — LOW (ref 13–17)
MAGNESIUM SERPL-MCNC: 2.5 MG/DL — SIGNIFICANT CHANGE UP (ref 1.6–2.6)
MCHC RBC-ENTMCNC: 30.7 PG — SIGNIFICANT CHANGE UP (ref 27–34)
MCHC RBC-ENTMCNC: 33.1 GM/DL — SIGNIFICANT CHANGE UP (ref 32–36)
MCV RBC AUTO: 92.8 FL — SIGNIFICANT CHANGE UP (ref 80–100)
NRBC # BLD: 0 /100 WBCS — SIGNIFICANT CHANGE UP (ref 0–0)
PHOSPHATE SERPL-MCNC: 4.6 MG/DL — HIGH (ref 2.5–4.5)
PLATELET # BLD AUTO: 232 K/UL — SIGNIFICANT CHANGE UP (ref 150–400)
POTASSIUM SERPL-MCNC: 4.2 MMOL/L — SIGNIFICANT CHANGE UP (ref 3.5–5.3)
POTASSIUM SERPL-SCNC: 4.2 MMOL/L — SIGNIFICANT CHANGE UP (ref 3.5–5.3)
RBC # BLD: 3.32 M/UL — LOW (ref 4.2–5.8)
RBC # FLD: 13.2 % — SIGNIFICANT CHANGE UP (ref 10.3–14.5)
SODIUM SERPL-SCNC: 135 MMOL/L — SIGNIFICANT CHANGE UP (ref 135–145)
WBC # BLD: 9.11 K/UL — SIGNIFICANT CHANGE UP (ref 3.8–10.5)
WBC # FLD AUTO: 9.11 K/UL — SIGNIFICANT CHANGE UP (ref 3.8–10.5)

## 2022-11-15 PROCEDURE — 99233 SBSQ HOSP IP/OBS HIGH 50: CPT

## 2022-11-15 PROCEDURE — 99233 SBSQ HOSP IP/OBS HIGH 50: CPT | Mod: GC

## 2022-11-15 RX ORDER — CARVEDILOL PHOSPHATE 80 MG/1
25 CAPSULE, EXTENDED RELEASE ORAL EVERY 12 HOURS
Refills: 0 | Status: DISCONTINUED | OUTPATIENT
Start: 2022-11-15 | End: 2022-11-17

## 2022-11-15 RX ORDER — ACETAMINOPHEN 500 MG
650 TABLET ORAL ONCE
Refills: 0 | Status: COMPLETED | OUTPATIENT
Start: 2022-11-15 | End: 2022-11-15

## 2022-11-15 RX ORDER — CARVEDILOL PHOSPHATE 80 MG/1
12.5 CAPSULE, EXTENDED RELEASE ORAL ONCE
Refills: 0 | Status: COMPLETED | OUTPATIENT
Start: 2022-11-15 | End: 2022-11-15

## 2022-11-15 RX ORDER — HEPARIN SODIUM 5000 [USP'U]/ML
5000 INJECTION INTRAVENOUS; SUBCUTANEOUS EVERY 8 HOURS
Refills: 0 | Status: DISCONTINUED | OUTPATIENT
Start: 2022-11-15 | End: 2022-11-15

## 2022-11-15 RX ORDER — INSULIN LISPRO 100/ML
10 VIAL (ML) SUBCUTANEOUS
Refills: 0 | Status: DISCONTINUED | OUTPATIENT
Start: 2022-11-15 | End: 2022-11-17

## 2022-11-15 RX ADMIN — ATORVASTATIN CALCIUM 80 MILLIGRAM(S): 80 TABLET, FILM COATED ORAL at 21:25

## 2022-11-15 RX ADMIN — Medication 10 UNIT(S): at 12:23

## 2022-11-15 RX ADMIN — CARVEDILOL PHOSPHATE 25 MILLIGRAM(S): 80 CAPSULE, EXTENDED RELEASE ORAL at 17:17

## 2022-11-15 RX ADMIN — CARVEDILOL PHOSPHATE 12.5 MILLIGRAM(S): 80 CAPSULE, EXTENDED RELEASE ORAL at 12:20

## 2022-11-15 RX ADMIN — Medication 75 MILLIGRAM(S): at 21:26

## 2022-11-15 RX ADMIN — BUMETANIDE 2 MILLIGRAM(S): 0.25 INJECTION INTRAMUSCULAR; INTRAVENOUS at 05:50

## 2022-11-15 RX ADMIN — BUMETANIDE 2 MILLIGRAM(S): 0.25 INJECTION INTRAMUSCULAR; INTRAVENOUS at 14:56

## 2022-11-15 RX ADMIN — INSULIN GLARGINE 40 UNIT(S): 100 INJECTION, SOLUTION SUBCUTANEOUS at 21:34

## 2022-11-15 RX ADMIN — CLOPIDOGREL BISULFATE 75 MILLIGRAM(S): 75 TABLET, FILM COATED ORAL at 12:14

## 2022-11-15 RX ADMIN — MUPIROCIN 1 APPLICATION(S): 20 OINTMENT TOPICAL at 17:18

## 2022-11-15 RX ADMIN — Medication 650 MILLIGRAM(S): at 22:40

## 2022-11-15 RX ADMIN — Medication 650 MILLIGRAM(S): at 06:31

## 2022-11-15 RX ADMIN — Medication 10 UNIT(S): at 17:17

## 2022-11-15 RX ADMIN — CARVEDILOL PHOSPHATE 12.5 MILLIGRAM(S): 80 CAPSULE, EXTENDED RELEASE ORAL at 05:50

## 2022-11-15 RX ADMIN — Medication 650 MILLIGRAM(S): at 23:02

## 2022-11-15 RX ADMIN — CHLORHEXIDINE GLUCONATE 1 APPLICATION(S): 213 SOLUTION TOPICAL at 12:10

## 2022-11-15 RX ADMIN — MUPIROCIN 1 APPLICATION(S): 20 OINTMENT TOPICAL at 05:51

## 2022-11-15 RX ADMIN — Medication 75 MILLIGRAM(S): at 14:56

## 2022-11-15 RX ADMIN — Medication 1: at 08:47

## 2022-11-15 RX ADMIN — Medication 1: at 12:21

## 2022-11-15 RX ADMIN — Medication 75 MILLIGRAM(S): at 05:49

## 2022-11-15 RX ADMIN — Medication 81 MILLIGRAM(S): at 12:14

## 2022-11-15 RX ADMIN — Medication 650 MILLIGRAM(S): at 07:00

## 2022-11-15 RX ADMIN — Medication 8 UNIT(S): at 08:48

## 2022-11-15 RX ADMIN — AMLODIPINE BESYLATE 10 MILLIGRAM(S): 2.5 TABLET ORAL at 05:50

## 2022-11-15 NOTE — PROGRESS NOTE ADULT - SUBJECTIVE AND OBJECTIVE BOX
Patient seen and examined at bedside.    63 yo M with PMH of T2DM, HTN, CAD. Transfer from [facility] for NSTEMI and management of acute decompensated HF. Pt has been undergoing diuresis with improvement of symptoms, no SOB but still significant leg edema.     Overnight Events:     RRT was called for the patient: S/P RRT for Hypoxic resp failure - 86% on RA requiring 5-6L oxygen via NC, elevated B/P 200/80s,  tremors after Echo   Pt now titrated down on oxygen to 3L nasal canula  s/p Labetalol 5mg IV push and Hydralazine increased to 75mg TID  Blood pressure improved to 180/63  CXR Mildly increased hazy opacities in the right mid to lower lung    Review Of Systems: No chest pain, shortness of breath, or palpitations            Current Meds:  amLODIPine   Tablet 10 milliGRAM(s) Oral daily  aspirin enteric coated 81 milliGRAM(s) Oral daily  atorvastatin 80 milliGRAM(s) Oral at bedtime  buMETAnide Injectable 2 milliGRAM(s) IV Push two times a day  carvedilol 12.5 milliGRAM(s) Oral every 12 hours  chlorhexidine 2% Cloths 1 Application(s) Topical daily  clopidogrel Tablet 75 milliGRAM(s) Oral daily  dextrose 5%. 1000 milliLiter(s) IV Continuous <Continuous>  dextrose 5%. 1000 milliLiter(s) IV Continuous <Continuous>  dextrose 50% Injectable 25 Gram(s) IV Push once  dextrose 50% Injectable 12.5 Gram(s) IV Push once  dextrose 50% Injectable 25 Gram(s) IV Push once  dextrose Oral Gel 15 Gram(s) Oral once PRN  glucagon  Injectable 1 milliGRAM(s) IntraMuscular once  hydrALAZINE 75 milliGRAM(s) Oral three times a day  insulin glargine Injectable (LANTUS) 40 Unit(s) SubCutaneous at bedtime  insulin lispro (ADMELOG) corrective regimen sliding scale   SubCutaneous three times a day before meals  insulin lispro (ADMELOG) corrective regimen sliding scale   SubCutaneous at bedtime  insulin lispro Injectable (ADMELOG) 8 Unit(s) SubCutaneous three times a day before meals  mupirocin 2% Nasal 1 Application(s) Both Nostrils two times a day  senna 2 Tablet(s) Oral at bedtime      Vitals:  T(F): 99 (11-15), Max: 99 (11-15)  HR: 73 (11-15) (60 - 81)  BP: 170/80 (11-15) (136/78 - 180/68)  RR: 18 (11-15)  SpO2: 95% (11-15)  I&O's Summary    14 Nov 2022 07:01  -  15 Nov 2022 07:00  --------------------------------------------------------  IN: 240 mL / OUT: 2000 mL / NET: -1760 mL        Physical Exam:  Appearance: No acute distress; well appearing  Eyes: PERRL, EOMI, pink conjunctiva  HEENT: Normal oral mucosa  Cardiovascular: RRR, S1, S2, no murmurs, rubs, or gallops; no edema; no JVD  Respiratory: Clear to auscultation bilaterally  Gastrointestinal: soft, non-tender, non-distended with normal bowel sounds  Musculoskeletal: No clubbing; no joint deformity   Neurologic: Non-focal  Lymphatic: No lymphadenopathy  Psychiatry: AAOx3, mood & affect appropriate  Skin: No rashes, ecchymoses, or cyanosis                          10.4   9.71  )-----------( 241      ( 14 Nov 2022 06:13 )             31.5     11-14    136  |  102  |  56<H>  ----------------------------<  188<H>  4.4   |  21<L>  |  1.92<H>    Ca    9.5      14 Nov 2022 06:13  Phos  4.4     11-14  Mg     2.5     11-14        CARDIAC MARKERS ( 11 Nov 2022 16:09 )  872 ng/L / x     / x     / 454 U/L / x     / 5.0 ng/mL              New ECG(s): Personally reviewed    Echo:    EF (Coburn Method): 57 %Doppler Peak Velocity (m/sec):  AoV=2.2  ------------------------------------------------------------------------  Observations:  Mitral Valve: Mitral annular calcification, otherwise  normal mitral valve.  Aortic Valve/Aorta: Normal trileaflet aortic valve. Peak  transaortic valve gradient equals 19 mm Hg, mean  transaortic valve gradient equals 12 mm Hg, aortic valve  velocity time integral equals 51 cm, estimated aortic valve  area equals 2.3 sqcm. Peak left ventricular outflow tract  gradient equals 5 mm Hg, mean gradient is equal to 3 mm Hg,  LVOT velocity time integral equals 28 cm.  Aortic Root: 3.3 cm.  Ascending Aorta: 3.3 cm.  LVOT diameter: 2.3 cm.  Left Atrium: Normal left atrium.  LA volume index = 29  cc/m2.  Left Ventricle: Overall preserved left ventricular ejection  fraction. Regional variation naoted  Eccentric left  ventricular hypertrophy (dilated left ventricle with normal  relative wall thickness). Normal LV filling pressure.  Right Heart: Right atrium not well visualized. The right  ventricle is not well visualized. Tricuspid valve not well  visualized. Pulmonic valve not well visualized.  Pericardium/Pleura: Normal pericardium with no pericardial  effusion.  Hemodynamic: Estimated right atrial pressure is 8 mm Hg.  ------------------------------------------------------------------------  Conclusions:  1. Eccentric left ventricular hypertrophy (dilated left  ventricle with normal relative wall thickness).  2. Overall preserved left ventricular ejection fraction.  Regional variation naoted  3. The right ventricle is not well visualized.  *** No previous Echo exam.  ------------------------------------------------------------------------  Confirmed on  11/14/2022 - 19:51:45 by LESLEY Ford  ------------------------------------------------------------------------      Stress Testing:     Cath:    Imaging:    Interpretation of Telemetry:   Patient seen and examined at bedside.    63 yo M with PMH of T2DM, HTN, CAD. Transfer from [facility] for NSTEMI and management of acute decompensated HF. Pt has been undergoing diuresis with improvement of symptoms, no SOB but still significant leg edema.     Overnight Events:     RRT was called for the patient: S/P RRT for Hypoxic resp failure - 86% on RA requiring 5-6L oxygen via NC, elevated B/P 200/80s,  tremors after Echo   Pt now titrated down on oxygen to 3L nasal canula  s/p Labetalol 5mg IV push and Hydralazine increased to 75mg TID  Blood pressure improved to 180/63  CXR Mildly increased hazy opacities in the right mid to lower lung    When I saw him, the patient was feeling well and had no SOB.     Review Of Systems: No chest pain, shortness of breath, or palpitations            Current Meds:  amLODIPine   Tablet 10 milliGRAM(s) Oral daily  aspirin enteric coated 81 milliGRAM(s) Oral daily  atorvastatin 80 milliGRAM(s) Oral at bedtime  buMETAnide Injectable 2 milliGRAM(s) IV Push two times a day  carvedilol 12.5 milliGRAM(s) Oral every 12 hours  chlorhexidine 2% Cloths 1 Application(s) Topical daily  clopidogrel Tablet 75 milliGRAM(s) Oral daily  dextrose 5%. 1000 milliLiter(s) IV Continuous <Continuous>  dextrose 5%. 1000 milliLiter(s) IV Continuous <Continuous>  dextrose 50% Injectable 25 Gram(s) IV Push once  dextrose 50% Injectable 12.5 Gram(s) IV Push once  dextrose 50% Injectable 25 Gram(s) IV Push once  dextrose Oral Gel 15 Gram(s) Oral once PRN  glucagon  Injectable 1 milliGRAM(s) IntraMuscular once  hydrALAZINE 75 milliGRAM(s) Oral three times a day  insulin glargine Injectable (LANTUS) 40 Unit(s) SubCutaneous at bedtime  insulin lispro (ADMELOG) corrective regimen sliding scale   SubCutaneous three times a day before meals  insulin lispro (ADMELOG) corrective regimen sliding scale   SubCutaneous at bedtime  insulin lispro Injectable (ADMELOG) 8 Unit(s) SubCutaneous three times a day before meals  mupirocin 2% Nasal 1 Application(s) Both Nostrils two times a day  senna 2 Tablet(s) Oral at bedtime      Vitals:  T(F): 99 (11-15), Max: 99 (11-15)  HR: 73 (11-15) (60 - 81)  BP: 170/80 (11-15) (136/78 - 180/68)  RR: 18 (11-15)  SpO2: 95% (11-15)  I&O's Summary    14 Nov 2022 07:01  -  15 Nov 2022 07:00  --------------------------------------------------------  IN: 240 mL / OUT: 2000 mL / NET: -1760 mL        Physical Exam:  Appearance: No acute distress; well appearing  Eyes: PERRL, EOMI, pink conjunctiva  HEENT: Normal oral mucosa  Cardiovascular: RRR, S1, S2, no murmurs, rubs, or gallops; no edema; no JVD  Respiratory: Clear to auscultation bilaterally  Gastrointestinal: soft, non-tender, non-distended with normal bowel sounds  Musculoskeletal: No clubbing; no joint deformity   Neurologic: Non-focal  Lymphatic: No lymphadenopathy  Psychiatry: AAOx3, mood & affect appropriate  Skin: No rashes, ecchymoses, or cyanosis                          10.4   9.71  )-----------( 241      ( 14 Nov 2022 06:13 )             31.5     11-14    136  |  102  |  56<H>  ----------------------------<  188<H>  4.4   |  21<L>  |  1.92<H>    Ca    9.5      14 Nov 2022 06:13  Phos  4.4     11-14  Mg     2.5     11-14        CARDIAC MARKERS ( 11 Nov 2022 16:09 )  872 ng/L / x     / x     / 454 U/L / x     / 5.0 ng/mL              New ECG(s): Personally reviewed    Echo:    EF (Coburn Method): 57 %Doppler Peak Velocity (m/sec):  AoV=2.2  ------------------------------------------------------------------------  Observations:  Mitral Valve: Mitral annular calcification, otherwise  normal mitral valve.  Aortic Valve/Aorta: Normal trileaflet aortic valve. Peak  transaortic valve gradient equals 19 mm Hg, mean  transaortic valve gradient equals 12 mm Hg, aortic valve  velocity time integral equals 51 cm, estimated aortic valve  area equals 2.3 sqcm. Peak left ventricular outflow tract  gradient equals 5 mm Hg, mean gradient is equal to 3 mm Hg,  LVOT velocity time integral equals 28 cm.  Aortic Root: 3.3 cm.  Ascending Aorta: 3.3 cm.  LVOT diameter: 2.3 cm.  Left Atrium: Normal left atrium.  LA volume index = 29  cc/m2.  Left Ventricle: Overall preserved left ventricular ejection  fraction. Regional variation naoted  Eccentric left  ventricular hypertrophy (dilated left ventricle with normal  relative wall thickness). Normal LV filling pressure.  Right Heart: Right atrium not well visualized. The right  ventricle is not well visualized. Tricuspid valve not well  visualized. Pulmonic valve not well visualized.  Pericardium/Pleura: Normal pericardium with no pericardial  effusion.  Hemodynamic: Estimated right atrial pressure is 8 mm Hg.  ------------------------------------------------------------------------  Conclusions:  1. Eccentric left ventricular hypertrophy (dilated left  ventricle with normal relative wall thickness).  2. Overall preserved left ventricular ejection fraction.  Regional variation naoted  3. The right ventricle is not well visualized.  *** No previous Echo exam.  ------------------------------------------------------------------------  Confirmed on  11/14/2022 - 19:51:45 by LESLEY Ford  ------------------------------------------------------------------------      Stress Testing:     Cath:    Imaging:    Interpretation of Telemetry:   Patient seen and examined at bedside.    63 yo M with PMH of T2DM, HTN, CAD. Transfer from [facility] for NSTEMI and management of acute decompensated HF. Pt has been undergoing diuresis with improvement of symptoms, no SOB but still significant leg edema.     Overnight Events:     RRT was called for the patient: S/P RRT for Hypoxic resp failure - 86% on RA requiring 5-6L oxygen via NC, elevated B/P 200/80s,  tremors after Echo   Pt now titrated down on oxygen to 3L nasal canula  s/p Labetalol 5mg IV push and Hydralazine increased to 75mg TID  Blood pressure improved to 180/63  CXR Mildly increased hazy opacities in the right mid to lower lung    When I saw him, the patient was feeling well and had no SOB.     Review Of Systems: No chest pain, shortness of breath, or palpitations            Current Meds:  amLODIPine   Tablet 10 milliGRAM(s) Oral daily  aspirin enteric coated 81 milliGRAM(s) Oral daily  atorvastatin 80 milliGRAM(s) Oral at bedtime  buMETAnide Injectable 2 milliGRAM(s) IV Push two times a day  carvedilol 12.5 milliGRAM(s) Oral every 12 hours  chlorhexidine 2% Cloths 1 Application(s) Topical daily  clopidogrel Tablet 75 milliGRAM(s) Oral daily  dextrose 5%. 1000 milliLiter(s) IV Continuous <Continuous>  dextrose 5%. 1000 milliLiter(s) IV Continuous <Continuous>  dextrose 50% Injectable 25 Gram(s) IV Push once  dextrose 50% Injectable 12.5 Gram(s) IV Push once  dextrose 50% Injectable 25 Gram(s) IV Push once  dextrose Oral Gel 15 Gram(s) Oral once PRN  glucagon  Injectable 1 milliGRAM(s) IntraMuscular once  hydrALAZINE 75 milliGRAM(s) Oral three times a day  insulin glargine Injectable (LANTUS) 40 Unit(s) SubCutaneous at bedtime  insulin lispro (ADMELOG) corrective regimen sliding scale   SubCutaneous three times a day before meals  insulin lispro (ADMELOG) corrective regimen sliding scale   SubCutaneous at bedtime  insulin lispro Injectable (ADMELOG) 8 Unit(s) SubCutaneous three times a day before meals  mupirocin 2% Nasal 1 Application(s) Both Nostrils two times a day  senna 2 Tablet(s) Oral at bedtime      Vitals:  T(F): 99 (11-15), Max: 99 (11-15)  HR: 73 (11-15) (60 - 81)  BP: 170/80 (11-15) (136/78 - 180/68)  RR: 18 (11-15)  SpO2: 95% (11-15)  I&O's Summary    14 Nov 2022 07:01  -  15 Nov 2022 07:00  --------------------------------------------------------  IN: 240 mL / OUT: 2000 mL / NET: -1760 mL        Physical Exam:  Appearance: No acute distress; well appearing  Eyes: PERRL, EOMI, pink conjunctiva  HEENT: Normal oral mucosa  Cardiovascular: RRR, S1, S2, no murmurs, rubs, or gallops; no edema; no JVD  Respiratory: Clear to auscultation bilaterally  Gastrointestinal: soft, non-tender, non-distended with normal bowel sounds  Musculoskeletal: No clubbing; no joint deformity                         10.4   9.71  )-----------( 241      ( 14 Nov 2022 06:13 )             31.5     11-14    136  |  102  |  56<H>  ----------------------------<  188<H>  4.4   |  21<L>  |  1.92<H>    Ca    9.5      14 Nov 2022 06:13  Phos  4.4     11-14  Mg     2.5     11-14        CARDIAC MARKERS ( 11 Nov 2022 16:09 )  872 ng/L / x     / x     / 454 U/L / x     / 5.0 ng/mL              New ECG(s): Personally reviewed    Echo:    EF (Coburn Method): 57 %Doppler Peak Velocity (m/sec):  AoV=2.2  ------------------------------------------------------------------------  Observations:  Mitral Valve: Mitral annular calcification, otherwise  normal mitral valve.  Aortic Valve/Aorta: Normal trileaflet aortic valve. Peak  transaortic valve gradient equals 19 mm Hg, mean  transaortic valve gradient equals 12 mm Hg, aortic valve  velocity time integral equals 51 cm, estimated aortic valve  area equals 2.3 sqcm. Peak left ventricular outflow tract  gradient equals 5 mm Hg, mean gradient is equal to 3 mm Hg,  LVOT velocity time integral equals 28 cm.  Aortic Root: 3.3 cm.  Ascending Aorta: 3.3 cm.  LVOT diameter: 2.3 cm.  Left Atrium: Normal left atrium.  LA volume index = 29  cc/m2.  Left Ventricle: Overall preserved left ventricular ejection  fraction. Regional variation naoted  Eccentric left  ventricular hypertrophy (dilated left ventricle with normal  relative wall thickness). Normal LV filling pressure.  Right Heart: Right atrium not well visualized. The right  ventricle is not well visualized. Tricuspid valve not well  visualized. Pulmonic valve not well visualized.  Pericardium/Pleura: Normal pericardium with no pericardial  effusion.  Hemodynamic: Estimated right atrial pressure is 8 mm Hg.  ------------------------------------------------------------------------  Conclusions:  1. Eccentric left ventricular hypertrophy (dilated left  ventricle with normal relative wall thickness).  2. Overall preserved left ventricular ejection fraction.  Regional variation naoted  3. The right ventricle is not well visualized.  *** No previous Echo exam.  ------------------------------------------------------------------------  Confirmed on  11/14/2022 - 19:51:45 by LESLEY Ford  ------------------------------------------------------------------------      Stress Testing:     Cath:    Imaging:    Interpretation of Telemetry:

## 2022-11-15 NOTE — PROGRESS NOTE ADULT - SUBJECTIVE AND OBJECTIVE BOX
Kamila Shahid MD  Division of Hospital Medicine  [facility]   Available on [employer] Teams (Mon-Fri 8am-5pm)    * messages preferred prior to calls  Other Times:  [phone]      Patient is a 62y old  Male who presents with a chief complaint of nstemi (13 Nov 2022 10:31)      SUBJECTIVE / OVERNIGHT EVENTS: no acute events overnight. denies any fever, chills, cough. dyspnea improved. no active chest pain.  ADDITIONAL REVIEW OF SYSTEMS:    MEDICATIONS  (STANDING):  amLODIPine   Tablet 10 milliGRAM(s) Oral daily  aspirin enteric coated 81 milliGRAM(s) Oral daily  atorvastatin 80 milliGRAM(s) Oral at bedtime  buMETAnide Injectable 2 milliGRAM(s) IV Push two times a day  carvedilol 25 milliGRAM(s) Oral every 12 hours  carvedilol 12.5 milliGRAM(s) Oral once  chlorhexidine 2% Cloths 1 Application(s) Topical daily  clopidogrel Tablet 75 milliGRAM(s) Oral daily  dextrose 5%. 1000 milliLiter(s) (50 mL/Hr) IV Continuous <Continuous>  dextrose 5%. 1000 milliLiter(s) (100 mL/Hr) IV Continuous <Continuous>  dextrose 50% Injectable 25 Gram(s) IV Push once  dextrose 50% Injectable 12.5 Gram(s) IV Push once  dextrose 50% Injectable 25 Gram(s) IV Push once  glucagon  Injectable 1 milliGRAM(s) IntraMuscular once  hydrALAZINE 75 milliGRAM(s) Oral three times a day  insulin glargine Injectable (LANTUS) 40 Unit(s) SubCutaneous at bedtime  insulin lispro (ADMELOG) corrective regimen sliding scale   SubCutaneous three times a day before meals  insulin lispro (ADMELOG) corrective regimen sliding scale   SubCutaneous at bedtime  insulin lispro Injectable (ADMELOG) 10 Unit(s) SubCutaneous three times a day before meals  mupirocin 2% Nasal 1 Application(s) Both Nostrils two times a day  senna 2 Tablet(s) Oral at bedtime    MEDICATIONS  (PRN):  dextrose Oral Gel 15 Gram(s) Oral once PRN Blood Glucose LESS THAN 70 milliGRAM(s)/deciliter      CAPILLARY BLOOD GLUCOSE      POCT Blood Glucose.: 200 mg/dL (15 Nov 2022 08:09)  POCT Blood Glucose.: 260 mg/dL (14 Nov 2022 21:57)  POCT Blood Glucose.: 213 mg/dL (14 Nov 2022 16:40)    I&O's Summary    14 Nov 2022 07:01  -  15 Nov 2022 07:00  --------------------------------------------------------  IN: 240 mL / OUT: 2000 mL / NET: -1760 mL        PHYSICAL EXAM:  Vital Signs Last 24 Hrs  T(C): 36.7 (15 Nov 2022 12:11), Max: 37.2 (15 Nov 2022 05:45)  T(F): 98 (15 Nov 2022 12:11), Max: 99 (15 Nov 2022 05:45)  HR: 63 (15 Nov 2022 12:11) (60 - 81)  BP: 158/69 (15 Nov 2022 12:11) (136/78 - 180/68)  BP(mean): --  RR: 17 (15 Nov 2022 12:11) (17 - 20)  SpO2: 98% (15 Nov 2022 12:12) (95% - 99%)    Parameters below as of 15 Nov 2022 12:12  Patient On (Oxygen Delivery Method): room air      CONSTITUTIONAL: NAD, well-developed, well-groomed  EYES: PERRLA; conjunctiva and sclera clear  ENMT: Moist oral mucosa, no pharyngeal injection or exudates; normal dentition  NECK: Supple, no palpable masses; +JVD  RESPIRATORY: Normal respiratory effort; crackles b/l R>L  CARDIOVASCULAR: Regular rate and rhythm, normal S1 and S2, no murmur/rub/gallop; 2+ pitting lower extremity edema b/l; Peripheral pulses are 2+ bilaterally  ABDOMEN: Soft, Nondistended, Nontender to palpation, normoactive bowel sounds  MUSCULOSKELETAL:  No clubbing or cyanosis of digits; no joint swelling or tenderness to palpation  PSYCH: A+O to person, place, and time; affect appropriate  NEUROLOGY: CN 2-12 are intact and symmetric; no gross sensory deficits   SKIN: No rashes; no palpable lesions      LABS:                        10.2   9.11  )-----------( 232      ( 15 Nov 2022 07:31 )             30.8     11-15    135  |  100  |  55<H>  ----------------------------<  203<H>  4.2   |  24  |  1.95<H>    Ca    9.2      15 Nov 2022 07:26  Phos  4.6     11-15  Mg     2.5     11-15          RADIOLOGY & ADDITIONAL TESTS:  Results Reviewed:   Imaging Personally Reviewed:  11/14/22 TTE:  Observations:  Mitral Valve: Mitral annular calcification, otherwise  normal mitral valve.  Aortic Valve/Aorta: Normal trileaflet aortic valve. Peak  transaortic valve gradient equals 19 mm Hg, mean  transaortic valve gradient equals 12 mm Hg, aortic valve  velocity time integral equals 51 cm, estimated aortic valve  area equals 2.3 sqcm. Peak left ventricular outflow tract  gradient equals 5 mm Hg, mean gradient is equal to 3 mm Hg,  LVOT velocity time integral equals 28 cm.  Aortic Root: 3.3 cm.  Ascending Aorta: 3.3 cm.  LVOT diameter: 2.3 cm.  Left Atrium: Normal left atrium.  LA volume index = 29  cc/m2.  Left Ventricle: Overall preserved left ventricular ejection  fraction. Regional variation naoted  Eccentric left  ventricular hypertrophy (dilated left ventricle with normal  relative wall thickness). Normal LV filling pressure.  Right Heart: Right atrium not well visualized. The right  ventricle is not well visualized. Tricuspid valve not well  visualized. Pulmonic valve not well visualized.  Pericardium/Pleura: Normal pericardium with no pericardial  effusion.  Hemodynamic: Estimated right atrial pressure is 8 mm Hg.  ------------------------------------------------------------------------  Conclusions:  1. Eccentric left ventricular hypertrophy (dilated left  ventricle with normal relative wall thickness).  2. Overall preserved left ventricular ejection fraction.  Regional variation naoted  3. The right ventricle is not well visualized.  Electrocardiogram Personally Reviewed:    COORDINATION OF CARE:  Care Discussed with Consultants/Other Providers [Y]: medicine STACEY Kelly  Prior or Outpatient Records Reviewed [Y/N]:

## 2022-11-15 NOTE — PROGRESS NOTE ADULT - ASSESSMENT
63 yo male with PMH of HTN, HLD, T2DM who presents as transfer from [facility] for NSTEMI and CHF exacerbation requiring IV diuresis pending [facility] tentatively 11/16.

## 2022-11-15 NOTE — PROGRESS NOTE ADULT - PROBLEM SELECTOR PLAN 6
Home basal 47u, bolus 10u  - HbA1c 9.4%  - c/w lantus to 40 units qHS  - increase pre-meal admelog to 10 units TID qAC, hold if NPO  - c/w sliding scale  - monitor FS qAC + qHS

## 2022-11-15 NOTE — PROGRESS NOTE ADULT - PROBLEM SELECTOR PLAN 2
TTE at OSH 11/9/22 showed LVEF 40-45%, LV systolic function was moderately decreased.   - still hypervolemic on exam  - c/w bumex 2mg IV BID  - increase carvedilol to 25mg BID  - c/w hydralazine 75mg TID  - strict I/Os, daily weights

## 2022-11-15 NOTE — PROGRESS NOTE ADULT - PROBLEM SELECTOR PLAN 1
NSTEMI at [facility], trops 8700->5000, started on asa/plavix/heparin gtt 11/9/22  - trop 872 here  - s/p hep gtt x 48 hours  - c/w ASA + Plavix  - c/w atorvastatin 80mg qHS  - Cardiology consult appreciated  - [facility] pending optimization of volume status and renal function - tentatively planned for tomorrow  - repeat TTE with LVEF 57%, eccentric LVH with dilated LV, RV not well visualized

## 2022-11-15 NOTE — PROGRESS NOTE ADULT - ASSESSMENT
61 yo M with PMH of T2DM, HTN, CAD. Transfer from [facility] for NSTEMI and management of acute decompensated HF. Pt has been undergoing diuresis with improvement of symptoms, no SOB but still significant leg edema.     REC:  1. NSTEMI  - patient had troponin 8700->5000 at OSH, started on ASA/Plavix/Heparin gtt since 11/9/2022 per chart  - continue ASA 81mg daily  - continue Plavix 75mg daily  - continue atorvastatin 80mg daily  - plan for cardiac catheterization with Dr. Nagy today or tomorrow    2. Acute on chronic heart failure with EF 40-45%  - repeat TTE (pending)   - continue diuretics with bumex 2mg IV bid  - increase hydralazine 75mg tid  - continue carvedilol 12.5mg bid  - Strict I/Os and daily standing weights  - replete electrolytes K>4, Mg>2    3. HTN  - continue amlodipine 10mg  - hydralazine 75mg tid   61 yo M with PMH of T2DM, HTN, CAD. Transfer from [facility] for NSTEMI and management of acute decompensated HF. Pt has been undergoing diuresis with improvement of symptoms, no SOB but still significant leg edema.     REC:  1. NSTEMI  - patient had troponin 8700->5000 at OSH, started on ASA/Plavix/Heparin gtt since 11/9/2022 per chart  - continue ASA 81mg daily  - continue Plavix 75mg daily  - continue atorvastatin 80mg daily  - Increase carvedilol to 25mg bid  - plan for cardiac catheterization with Dr. Nagy tomorrow    2. Acute on chronic heart failure with EF 40-45%  - repeat TTE (pending)   - continue diuretics with bumex 2mg IV bid  - increase hydralazine 75mg tid  - Increase carvedilol to 25mg bid  - Strict I/Os and daily standing weights  - replete electrolytes K>4, Mg>2    3. HTN  - continue amlodipine 10mg  - hydralazine 75mg tid   63 yo M with PMH of T2DM, HTN, CAD. Transfer from [facility] for NSTEMI and management of acute decompensated HF. Pt has been undergoing diuresis with improvement of symptoms, no SOB but still significant leg edema.     REC:  1. NSTEMI  - patient had troponin 8700->5000 at OSH, started on ASA/Plavix/Heparin gtt since 11/9/2022 per chart  - continue ASA 81mg daily  - continue Plavix 75mg daily  - continue atorvastatin 80mg daily  - Increase carvedilol to 25mg bid  - plan for cardiac catheterization with Dr. Nagy tomorrow    2. Acute on chronic heart failure with EF 40-45%  - repeat TTE (pending)   - continue diuretics with bumex 2mg IV bid  - hydralazine 75mg tid  - Increase carvedilol to 25mg bid  - Strict I/Os and daily standing weights  - replete electrolytes K>4, Mg>2    3. HTN  - continue amlodipine 10mg  - hydralazine 75mg tid

## 2022-11-15 NOTE — PROGRESS NOTE ADULT - PROBLEM SELECTOR PLAN 4
uncontrolled hypertension.  - c/w amlodipine 10mg daily   - home losartan 50mg daily on hold given RADHA  - increase carvedilol to 25 mg q12h  - c/w hydralazine 75mg TID  - continue to monitor BP

## 2022-11-15 NOTE — PROGRESS NOTE ADULT - PROBLEM SELECTOR PLAN 3
RADHA, baseline Cr 1.2, has had RADHA to 2.2 before at [facility]. Favor cardiorenal syndrome i/s/o HF exacerbation  - f/u UA, renal/bladder ultrasound - proteinuria, no hydronephrosis  - FEUrea - prerenal etiology. supports caridorenal etiology.  - diuresis as above  - avoid nephrotoxic medications, renally dose meds to GFR  - monitor Cr on BMP

## 2022-11-16 LAB
ANION GAP SERPL CALC-SCNC: 13 MMOL/L — SIGNIFICANT CHANGE UP (ref 5–17)
APTT BLD: 30.1 SEC — SIGNIFICANT CHANGE UP (ref 27.5–35.5)
BUN SERPL-MCNC: 59 MG/DL — HIGH (ref 7–23)
CALCIUM SERPL-MCNC: 9.3 MG/DL — SIGNIFICANT CHANGE UP (ref 8.4–10.5)
CHLORIDE SERPL-SCNC: 100 MMOL/L — SIGNIFICANT CHANGE UP (ref 96–108)
CO2 SERPL-SCNC: 21 MMOL/L — LOW (ref 22–31)
CREAT SERPL-MCNC: 2.15 MG/DL — HIGH (ref 0.5–1.3)
EGFR: 34 ML/MIN/1.73M2 — LOW
GLUCOSE BLDC GLUCOMTR-MCNC: 141 MG/DL — HIGH (ref 70–99)
GLUCOSE BLDC GLUCOMTR-MCNC: 186 MG/DL — HIGH (ref 70–99)
GLUCOSE BLDC GLUCOMTR-MCNC: 202 MG/DL — HIGH (ref 70–99)
GLUCOSE BLDC GLUCOMTR-MCNC: 218 MG/DL — HIGH (ref 70–99)
GLUCOSE SERPL-MCNC: 198 MG/DL — HIGH (ref 70–99)
HCT VFR BLD CALC: 31.1 % — LOW (ref 39–50)
HGB BLD-MCNC: 10.3 G/DL — LOW (ref 13–17)
INR BLD: 1.09 RATIO — SIGNIFICANT CHANGE UP (ref 0.88–1.16)
MAGNESIUM SERPL-MCNC: 2.5 MG/DL — SIGNIFICANT CHANGE UP (ref 1.6–2.6)
MCHC RBC-ENTMCNC: 30.7 PG — SIGNIFICANT CHANGE UP (ref 27–34)
MCHC RBC-ENTMCNC: 33.1 GM/DL — SIGNIFICANT CHANGE UP (ref 32–36)
MCV RBC AUTO: 92.8 FL — SIGNIFICANT CHANGE UP (ref 80–100)
NRBC # BLD: 0 /100 WBCS — SIGNIFICANT CHANGE UP (ref 0–0)
PLATELET # BLD AUTO: 252 K/UL — SIGNIFICANT CHANGE UP (ref 150–400)
POTASSIUM SERPL-MCNC: 4.4 MMOL/L — SIGNIFICANT CHANGE UP (ref 3.5–5.3)
POTASSIUM SERPL-SCNC: 4.4 MMOL/L — SIGNIFICANT CHANGE UP (ref 3.5–5.3)
PROTHROM AB SERPL-ACNC: 12.7 SEC — SIGNIFICANT CHANGE UP (ref 10.5–13.4)
RBC # BLD: 3.35 M/UL — LOW (ref 4.2–5.8)
RBC # FLD: 13.2 % — SIGNIFICANT CHANGE UP (ref 10.3–14.5)
SODIUM SERPL-SCNC: 134 MMOL/L — LOW (ref 135–145)
WBC # BLD: 9.08 K/UL — SIGNIFICANT CHANGE UP (ref 3.8–10.5)
WBC # FLD AUTO: 9.08 K/UL — SIGNIFICANT CHANGE UP (ref 3.8–10.5)

## 2022-11-16 PROCEDURE — 99233 SBSQ HOSP IP/OBS HIGH 50: CPT

## 2022-11-16 PROCEDURE — 99233 SBSQ HOSP IP/OBS HIGH 50: CPT | Mod: GC

## 2022-11-16 RX ORDER — HYDRALAZINE HCL 50 MG
100 TABLET ORAL THREE TIMES A DAY
Refills: 0 | Status: DISCONTINUED | OUTPATIENT
Start: 2022-11-16 | End: 2022-11-16

## 2022-11-16 RX ORDER — INSULIN GLARGINE 100 [IU]/ML
45 INJECTION, SOLUTION SUBCUTANEOUS AT BEDTIME
Refills: 0 | Status: DISCONTINUED | OUTPATIENT
Start: 2022-11-16 | End: 2022-11-17

## 2022-11-16 RX ORDER — HYDRALAZINE HCL 50 MG
100 TABLET ORAL THREE TIMES A DAY
Refills: 0 | Status: DISCONTINUED | OUTPATIENT
Start: 2022-11-16 | End: 2022-11-17

## 2022-11-16 RX ORDER — BUMETANIDE 0.25 MG/ML
1 INJECTION INTRAMUSCULAR; INTRAVENOUS EVERY 12 HOURS
Refills: 0 | Status: DISCONTINUED | OUTPATIENT
Start: 2022-11-16 | End: 2022-11-16

## 2022-11-16 RX ADMIN — INSULIN GLARGINE 45 UNIT(S): 100 INJECTION, SOLUTION SUBCUTANEOUS at 21:38

## 2022-11-16 RX ADMIN — BUMETANIDE 2 MILLIGRAM(S): 0.25 INJECTION INTRAMUSCULAR; INTRAVENOUS at 05:19

## 2022-11-16 RX ADMIN — CARVEDILOL PHOSPHATE 25 MILLIGRAM(S): 80 CAPSULE, EXTENDED RELEASE ORAL at 17:29

## 2022-11-16 RX ADMIN — Medication 1: at 08:22

## 2022-11-16 RX ADMIN — Medication 100 MILLIGRAM(S): at 21:43

## 2022-11-16 RX ADMIN — Medication 100 MILLIGRAM(S): at 14:30

## 2022-11-16 RX ADMIN — AMLODIPINE BESYLATE 10 MILLIGRAM(S): 2.5 TABLET ORAL at 05:19

## 2022-11-16 RX ADMIN — ATORVASTATIN CALCIUM 80 MILLIGRAM(S): 80 TABLET, FILM COATED ORAL at 21:43

## 2022-11-16 RX ADMIN — Medication 2: at 11:27

## 2022-11-16 RX ADMIN — Medication 10 UNIT(S): at 11:27

## 2022-11-16 RX ADMIN — Medication 10 UNIT(S): at 16:23

## 2022-11-16 RX ADMIN — Medication 10 UNIT(S): at 08:22

## 2022-11-16 RX ADMIN — CLOPIDOGREL BISULFATE 75 MILLIGRAM(S): 75 TABLET, FILM COATED ORAL at 11:25

## 2022-11-16 RX ADMIN — CHLORHEXIDINE GLUCONATE 1 APPLICATION(S): 213 SOLUTION TOPICAL at 11:27

## 2022-11-16 RX ADMIN — Medication 81 MILLIGRAM(S): at 11:25

## 2022-11-16 NOTE — PROGRESS NOTE ADULT - PROBLEM SELECTOR PLAN 1
NSTEMI at [facility], trops 8700->5000, started on asa/plavix/heparin gtt 11/9/22  - trop 872 here  - s/p hep gtt x 48 hours  - c/w ASA + Plavix  - c/w atorvastatin 80mg qHS  - Cardiology consult appreciated  - [facility] pending optimization of volume status and renal function - tentatively planned for tomorrow if Cr improved  - repeat TTE with LVEF 57%, eccentric LVH with dilated LV, RV not well visualized

## 2022-11-16 NOTE — PROGRESS NOTE ADULT - ASSESSMENT
63 yo M with PMH of T2DM, HTN, CAD. Transfer from [facility] for NSTEMI and management of acute decompensated HF. Pt has been undergoing diuresis with improvement of symptoms, no SOB but still significant leg edema.     REC:  1. NSTEMI  - patient had troponin 8700->5000 at OSH, started on ASA/Plavix/Heparin gtt since 11/9/2022 per chart  - continue ASA 81mg daily  - continue Plavix 75mg daily  - continue atorvastatin 80mg daily  - continue carvedilol 25mg bid  - plan for cardiac catheterization with Dr. Nagy today    2. Acute on chronic heart failure. 11/14 TTE EF 48%.  - continue diuretics with bumex 1mg IV bid  - hydralazine 75mg tid  - continue carvedilol 25mg bid  - Strict I/Os and daily standing weights  - replete electrolytes K>4, Mg>2    3. HTN  - continue amlodipine 10mg  - hydralazine 75mg tid    Haroldo Haddad, MS3 63 yo M with PMH of T2DM, HTN, CAD. Transfer from [facility] for NSTEMI and management of acute decompensated HF. Pt has been undergoing diuresis with improvement of symptoms, no SOB but still significant leg edema.     REC:  1. NSTEMI  - patient had troponin 8700->5000 at OSH, started on ASA/Plavix/Heparin gtt since 11/9/2022 per chart  - continue ASA 81mg daily  - continue Plavix 75mg daily  - continue atorvastatin 80mg daily  - continue carvedilol 25mg bid  - patient has elevation in creatinine, will reassess for cath tomorrow    2. Acute on chronic heart failure. 11/14 TTE EF 48%.  - Please stop bumex in setting of increase in creatinine  - hydralazine 75mg tid  - continue carvedilol 25mg bid  - Strict I/Os and daily standing weights  - replete electrolytes K>4, Mg>2    3. HTN  - continue amlodipine 10mg  - hydralazine 75mg tid    Haroldo Haddad, MS3 61 yo M with PMH of T2DM, HTN, CAD. Transfer from [facility] for NSTEMI and management of acute decompensated HF. Pt has been undergoing diuresis with improvement of symptoms, no SOB but still significant leg edema.     REC:  1. NSTEMI  - patient had troponin 8700->5000 at OSH, started on ASA/Plavix/Heparin gtt since 11/9/2022 per chart  - continue ASA 81mg daily  - continue Plavix 75mg daily  - continue atorvastatin 80mg daily  - continue carvedilol 25mg bid  - patient has elevation in creatinine, will reassess for cath tomorrow    2. Acute on chronic heart failure. 11/14 TTE EF 48%.  - Please stop bumex in setting of increase in creatinine  - increase hydralazine to 100mg tid  - continue carvedilol 25mg bid  - Strict I/Os and daily standing weights  - replete electrolytes K>4, Mg>2    3. HTN  - continue amlodipine 10mg  - increase hydralazine to 100mg tid    Haroldo Haddad, MS3

## 2022-11-16 NOTE — PROGRESS NOTE ADULT - PROBLEM SELECTOR PLAN 6
Home basal 47u, bolus 10u  - HbA1c 9.4%  - increase lantus to 45 units qHS  - increase pre-meal admelog to 12 units TID qAC, hold if NPO  - c/w sliding scale  - monitor FS qAC + qHS Home basal 47u, bolus 10u  - HbA1c 9.4%  - increase lantus to 45 units qHS  - c/w pre-meal admelog 10 units TID qAC, hold if NPO  - c/w sliding scale  - monitor FS qAC + qHS

## 2022-11-16 NOTE — PROGRESS NOTE ADULT - PROBLEM SELECTOR PLAN 4
uncontrolled hypertension.  - c/w amlodipine 10mg daily   - home losartan 50mg daily on hold given RADHA  - c/w carvedilol to 25 mg q12h  - increase hydralazine to 100mg TID  - continue to monitor BP

## 2022-11-16 NOTE — PROGRESS NOTE ADULT - ASSESSMENT
61 yo male with PMH of HTN, HLD, T2DM who presents as transfer from [facility] for NSTEMI and CHF exacerbation requiring IV diuresis with course c/b RADHA on CKD in setting of diuresis tentatively planned fo [facility] on 11/17 pending renal function.

## 2022-11-16 NOTE — PROGRESS NOTE ADULT - PROBLEM SELECTOR PLAN 2
TTE at OSH 11/9/22 showed LVEF 40-45%, LV systolic function was moderately decreased.   - more euvolemic on exam today  - hold bumex - last dose this AM - given RADHA on CKD  - c/w carvedilol to 25mg BID  - increase hydralazine to 100mg TID  - strict I/Os, daily weights

## 2022-11-16 NOTE — PROGRESS NOTE ADULT - SUBJECTIVE AND OBJECTIVE BOX
Patient seen and examined at bedside.    Overnight Events:     Review Of Systems: No chest pain, shortness of breath, or palpitations            Current Meds:  amLODIPine   Tablet 10 milliGRAM(s) Oral daily  aspirin enteric coated 81 milliGRAM(s) Oral daily  atorvastatin 80 milliGRAM(s) Oral at bedtime  buMETAnide Injectable 2 milliGRAM(s) IV Push two times a day  carvedilol 25 milliGRAM(s) Oral every 12 hours  chlorhexidine 2% Cloths 1 Application(s) Topical daily  clopidogrel Tablet 75 milliGRAM(s) Oral daily  dextrose 5%. 1000 milliLiter(s) IV Continuous <Continuous>  dextrose 5%. 1000 milliLiter(s) IV Continuous <Continuous>  dextrose 50% Injectable 25 Gram(s) IV Push once  dextrose 50% Injectable 12.5 Gram(s) IV Push once  dextrose 50% Injectable 25 Gram(s) IV Push once  dextrose Oral Gel 15 Gram(s) Oral once PRN  glucagon  Injectable 1 milliGRAM(s) IntraMuscular once  hydrALAZINE 75 milliGRAM(s) Oral three times a day  insulin glargine Injectable (LANTUS) 40 Unit(s) SubCutaneous at bedtime  insulin lispro (ADMELOG) corrective regimen sliding scale   SubCutaneous three times a day before meals  insulin lispro (ADMELOG) corrective regimen sliding scale   SubCutaneous at bedtime  insulin lispro Injectable (ADMELOG) 10 Unit(s) SubCutaneous three times a day before meals  mupirocin 2% Nasal 1 Application(s) Both Nostrils two times a day  senna 2 Tablet(s) Oral at bedtime      Vitals:  T(F): 98 (11-16), Max: 98.6 (11-15)  HR: 56 (11-16) (56 - 70)  BP: 154/72 (11-16) (137/65 - 162/57)  RR: 18 (11-16)  SpO2: 95% (11-16)  I&O's Summary    14 Nov 2022 07:01  -  15 Nov 2022 07:00  --------------------------------------------------------  IN: 240 mL / OUT: 2000 mL / NET: -1760 mL    15 Nov 2022 07:01  -  16 Nov 2022 06:54  --------------------------------------------------------  IN: 0 mL / OUT: 1000 mL / NET: -1000 mL        Physical Exam:  Appearance: No acute distress; well appearing  Eyes: PERRL, EOMI, pink conjunctiva  HEENT: Normal oral mucosa  Cardiovascular: RRR, S1, S2, no murmurs, rubs, or gallops; no edema; no JVD  Respiratory: Clear to auscultation bilaterally  Gastrointestinal: soft, non-tender, non-distended with normal bowel sounds  Musculoskeletal: No clubbing; no joint deformity   Neurologic: Non-focal  Lymphatic: No lymphadenopathy  Psychiatry: AAOx3, mood & affect appropriate  Skin: No rashes, ecchymoses, or cyanosis                          10.2   9.11  )-----------( 232      ( 15 Nov 2022 07:31 )             30.8     11-15    135  |  100  |  55<H>  ----------------------------<  203<H>  4.2   |  24  |  1.95<H>    Ca    9.2      15 Nov 2022 07:26  Phos  4.6     11-15  Mg     2.5     11-15        CARDIAC MARKERS ( 11 Nov 2022 16:09 )  872 ng/L / x     / x     / 454 U/L / x     / 5.0 ng/mL              New ECG(s): Personally reviewed    Echo:    Stress Testing:     Cath:    Imaging:    Interpretation of Telemetry:   Patient seen and examined at bedside.    Overnight Events: No events. No CP, SOB, palpitations, headache. Pt has been undergoing diuresis with improvement of symptoms but still LE edema, currently wrapped.     Tele: sinus chaya/rhythm 50-60s    Current Meds:  amLODIPine   Tablet 10 milliGRAM(s) Oral daily  aspirin enteric coated 81 milliGRAM(s) Oral daily  atorvastatin 80 milliGRAM(s) Oral at bedtime  buMETAnide Injectable 2 milliGRAM(s) IV Push two times a day  carvedilol 25 milliGRAM(s) Oral every 12 hours  chlorhexidine 2% Cloths 1 Application(s) Topical daily  clopidogrel Tablet 75 milliGRAM(s) Oral daily  dextrose 5%. 1000 milliLiter(s) IV Continuous <Continuous>  dextrose 5%. 1000 milliLiter(s) IV Continuous <Continuous>  dextrose 50% Injectable 25 Gram(s) IV Push once  dextrose 50% Injectable 12.5 Gram(s) IV Push once  dextrose 50% Injectable 25 Gram(s) IV Push once  dextrose Oral Gel 15 Gram(s) Oral once PRN  glucagon  Injectable 1 milliGRAM(s) IntraMuscular once  hydrALAZINE 75 milliGRAM(s) Oral three times a day  insulin glargine Injectable (LANTUS) 40 Unit(s) SubCutaneous at bedtime  insulin lispro (ADMELOG) corrective regimen sliding scale   SubCutaneous three times a day before meals  insulin lispro (ADMELOG) corrective regimen sliding scale   SubCutaneous at bedtime  insulin lispro Injectable (ADMELOG) 10 Unit(s) SubCutaneous three times a day before meals  mupirocin 2% Nasal 1 Application(s) Both Nostrils two times a day  senna 2 Tablet(s) Oral at bedtime      Vitals:  T(F): 98 (11-16), Max: 98.6 (11-15)  HR: 56 (11-16) (56 - 70)  BP: 154/72 (11-16) (137/65 - 162/57)  RR: 18 (11-16)  SpO2: 95% (11-16)  I&O's Summary    14 Nov 2022 07:01  -  15 Nov 2022 07:00  --------------------------------------------------------  IN: 240 mL / OUT: 2000 mL / NET: -1760 mL    15 Nov 2022 07:01  -  16 Nov 2022 06:54  --------------------------------------------------------  IN: 0 mL / OUT: 1000 mL / NET: -1000 mL        Physical Exam:  Appearance: No acute distress; well appearing  Eyes: PERRL, EOMI, pink conjunctiva  HEENT: Normal oral mucosa  Cardiovascular: RRR, S1, S2, no murmurs, rubs, or gallops  Respiratory: Clear to auscultation bilaterally  Gastrointestinal: soft, non-tender, non-distended with normal bowel sounds  Neurologic: AAOx3, Non-focal  Ext: 1-2+ b/l pitting edema up to knees, currently wrapped. No rashes, ecchymoses, or cyanosis.                         10.2   9.11  )-----------( 232      ( 15 Nov 2022 07:31 )             30.8     11-15    135  |  100  |  55<H>  ----------------------------<  203<H>  4.2   |  24  |  1.95<H>    Ca    9.2      15 Nov 2022 07:26  Phos  4.6     11-15  Mg     2.5     11-15        CARDIAC MARKERS ( 11 Nov 2022 16:09 )  872 ng/L / x     / x     / 454 U/L / x     / 5.0 ng/mL        < from: TTE with Doppler (w/Cont) (11.14.22 @ 10:02) >  ------------------------------------------------------------------------  PROCEDURE: Transthoracic echocardiogram with 2-D, M-Mode  and complete spectral and color flow Doppler. Verbal  consent was obtained for injection of  Ultrasonic Enhancing  Agent following a discussion of risks and benefits.  Following intravenous injection of Ultrasonic Enhancing  Agent, harmonic imaging was performed.  INDICATION: Heart failure, unspecified (I50.9)  ------------------------------------------------------------------------  Dimensions:    Normal Values:  LA:     4.2    2.0 - 4.0 cm  Ao:     3.3    2.0 - 3.8 cm  SEPTUM: 1.0    0.6 - 1.2 cm  PWT:    1.1    0.6 - 1.1 cm  LVIDd:  6.6    3.0 - 5.6 cm  LVIDs:  3.4    1.8 - 4.0 cm  Derived variables:  LVMI: 134 g/m2  RWT: 0.33  Fractional short: 48 %  EF ([place] Method): 57 %Doppler Peak Velocity (m/sec):  AoV=2.2  ------------------------------------------------------------------------  Observations:  Mitral Valve: Mitral annular calcification, otherwise  normal mitral valve.  Aortic Valve/Aorta: Normal trileaflet aortic valve. Peak  transaortic valve gradient equals 19 mm Hg, mean  transaortic valve gradient equals 12 mm Hg, aortic valve  velocity time integral equals 51 cm, estimated aortic valve  area equals 2.3 sqcm. Peak left ventricular outflow tract  gradient equals 5 mm Hg, mean gradient is equal to 3 mm Hg,  LVOT velocity time integral equals 28 cm.  Aortic Root: 3.3 cm.  Ascending Aorta: 3.3 cm.  LVOT diameter: 2.3 cm.  Left Atrium: Normal left atrium.  LA volume index = 29  cc/m2.  Left Ventricle: Overall preserved left ventricular ejection  fraction. Regional variation naoted  Eccentric left  ventricular hypertrophy (dilated left ventricle with normal  relative wall thickness). Normal LV filling pressure.  Right Heart: Right atrium not well visualized. The right  ventricle is not well visualized. Tricuspid valve not well  visualized. Pulmonic valve not well visualized.  Pericardium/Pleura: Normal pericardium with no pericardial  effusion.  Hemodynamic: Estimated right atrial pressure is 8 mm Hg.  ------------------------------------------------------------------------  Conclusions:  1. Eccentric left ventricular hypertrophy (dilated left  ventricle with normal relative wall thickness).  2. Overall preserved left ventricular ejection fraction.  Regional variation naoted  3. The right ventricle is not well visualized.  *** No previous Echo exam.  ------------------------------------------------------------------------  Confirmed on  11/14/2022 - 19:51:45 by LESLEY Ford  ------------------------------------------------------------------------    < end of copied text >

## 2022-11-16 NOTE — PROGRESS NOTE ADULT - SUBJECTIVE AND OBJECTIVE BOX
Kamila Shahid MD  Division of Hospital Medicine  [facility]   Available on [employer] Teams (Mon-Fri 8am-5pm)    * messages preferred prior to calls  Other Times:  [phone]      Patient is a 62y old  Male who presents with a chief complaint of nstemi (13 Nov 2022 10:31)      SUBJECTIVE / OVERNIGHT EVENTS: no acute events overnight. no fever, chills, chest pain, nor dyspnea. ambulated around the unit with no dyspnea on exertion feels well overall.   ADDITIONAL REVIEW OF SYSTEMS:  Tele reviewed: SR with HR 50-70s    MEDICATIONS  (STANDING):  amLODIPine   Tablet 10 milliGRAM(s) Oral daily  aspirin enteric coated 81 milliGRAM(s) Oral daily  atorvastatin 80 milliGRAM(s) Oral at bedtime  carvedilol 25 milliGRAM(s) Oral every 12 hours  chlorhexidine 2% Cloths 1 Application(s) Topical daily  clopidogrel Tablet 75 milliGRAM(s) Oral daily  dextrose 5%. 1000 milliLiter(s) (50 mL/Hr) IV Continuous <Continuous>  dextrose 5%. 1000 milliLiter(s) (100 mL/Hr) IV Continuous <Continuous>  dextrose 50% Injectable 25 Gram(s) IV Push once  dextrose 50% Injectable 12.5 Gram(s) IV Push once  dextrose 50% Injectable 25 Gram(s) IV Push once  glucagon  Injectable 1 milliGRAM(s) IntraMuscular once  hydrALAZINE 100 milliGRAM(s) Oral three times a day  insulin glargine Injectable (LANTUS) 40 Unit(s) SubCutaneous at bedtime  insulin lispro (ADMELOG) corrective regimen sliding scale   SubCutaneous three times a day before meals  insulin lispro (ADMELOG) corrective regimen sliding scale   SubCutaneous at bedtime  insulin lispro Injectable (ADMELOG) 10 Unit(s) SubCutaneous three times a day before meals  mupirocin 2% Nasal 1 Application(s) Both Nostrils two times a day  senna 2 Tablet(s) Oral at bedtime    MEDICATIONS  (PRN):  dextrose Oral Gel 15 Gram(s) Oral once PRN Blood Glucose LESS THAN 70 milliGRAM(s)/deciliter      CAPILLARY BLOOD GLUCOSE      POCT Blood Glucose.: 202 mg/dL (16 Nov 2022 11:22)  POCT Blood Glucose.: 186 mg/dL (16 Nov 2022 08:05)  POCT Blood Glucose.: 216 mg/dL (15 Nov 2022 21:26)  POCT Blood Glucose.: 147 mg/dL (15 Nov 2022 16:38)    I&O's Summary    15 Nov 2022 07:01  -  16 Nov 2022 07:00  --------------------------------------------------------  IN: 0 mL / OUT: 1000 mL / NET: -1000 mL        PHYSICAL EXAM:  Vital Signs Last 24 Hrs  T(C): 36.7 (16 Nov 2022 08:59), Max: 37 (15 Nov 2022 20:55)  T(F): 98 (16 Nov 2022 08:59), Max: 98.6 (15 Nov 2022 20:55)  HR: 58 (16 Nov 2022 08:59) (56 - 70)  BP: 162/72 (16 Nov 2022 08:59) (137/65 - 162/72)  BP(mean): --  RR: 18 (16 Nov 2022 08:59) (17 - 18)  SpO2: 96% (16 Nov 2022 08:59) (94% - 96%)    Parameters below as of 16 Nov 2022 08:59  Patient On (Oxygen Delivery Method): room air      CONSTITUTIONAL: NAD, well-developed, well-groomed  EYES: PERRLA; conjunctiva and sclera clear  ENMT: Moist oral mucosa, no pharyngeal injection or exudates; normal dentition  NECK: Supple, no palpable masses; +JVD  RESPIRATORY: Normal respiratory effort; lungs clear to auscultation b/l with no crackles  CARDIOVASCULAR: Regular rate and rhythm, normal S1 and S2, no murmur/rub/gallop; trace to 1+ pitting lower extremity edema b/l improved; Peripheral pulses are 2+ bilaterally  ABDOMEN: Soft, Nondistended, Nontender to palpation, normoactive bowel sounds  MUSCULOSKELETAL:  No clubbing or cyanosis of digits; no joint swelling or tenderness to palpation  PSYCH: A+O to person, place, and time; affect appropriate  NEUROLOGY: CN 2-12 are intact and symmetric; no gross sensory deficits   SKIN: No rashes; no palpable lesions, +LE wrapped in ACE bandaging for compression    LABS:                        10.3   9.08  )-----------( 252      ( 16 Nov 2022 06:51 )             31.1     11-16    134<L>  |  100  |  59<H>  ----------------------------<  198<H>  4.4   |  21<L>  |  2.15<H>    Ca    9.3      16 Nov 2022 06:51  Phos  4.6     11-15  Mg     2.5     11-16      PT/INR - ( 16 Nov 2022 06:51 )   PT: 12.7 sec;   INR: 1.09 ratio         PTT - ( 16 Nov 2022 06:51 )  PTT:30.1 sec      RADIOLOGY & ADDITIONAL TESTS:  Results Reviewed: uptrending BUN/Cr  Imaging Personally Reviewed:  Electrocardiogram Personally Reviewed:    COORDINATION OF CARE:  Care Discussed with Consultants/Other Providers [Y]: Donte Tate medicine TRELL Bang  Prior or Outpatient Records Reviewed [Y/N]:

## 2022-11-17 ENCOUNTER — TRANSCRIPTION ENCOUNTER (OUTPATIENT)
Age: 62
End: 2022-11-17

## 2022-11-17 VITALS
OXYGEN SATURATION: 96 % | TEMPERATURE: 98 F | SYSTOLIC BLOOD PRESSURE: 169 MMHG | RESPIRATION RATE: 18 BRPM | HEART RATE: 65 BPM | DIASTOLIC BLOOD PRESSURE: 69 MMHG

## 2022-11-17 LAB
ANION GAP SERPL CALC-SCNC: 12 MMOL/L — SIGNIFICANT CHANGE UP (ref 5–17)
BUN SERPL-MCNC: 58 MG/DL — HIGH (ref 7–23)
CALCIUM SERPL-MCNC: 9.2 MG/DL — SIGNIFICANT CHANGE UP (ref 8.4–10.5)
CHLORIDE SERPL-SCNC: 100 MMOL/L — SIGNIFICANT CHANGE UP (ref 96–108)
CO2 SERPL-SCNC: 22 MMOL/L — SIGNIFICANT CHANGE UP (ref 22–31)
CREAT SERPL-MCNC: 2.06 MG/DL — HIGH (ref 0.5–1.3)
EGFR: 36 ML/MIN/1.73M2 — LOW
GLUCOSE BLDC GLUCOMTR-MCNC: 164 MG/DL — HIGH (ref 70–99)
GLUCOSE BLDC GLUCOMTR-MCNC: 180 MG/DL — HIGH (ref 70–99)
GLUCOSE BLDC GLUCOMTR-MCNC: 200 MG/DL — HIGH (ref 70–99)
GLUCOSE SERPL-MCNC: 196 MG/DL — HIGH (ref 70–99)
MAGNESIUM SERPL-MCNC: 2.6 MG/DL — SIGNIFICANT CHANGE UP (ref 1.6–2.6)
PHOSPHATE SERPL-MCNC: 4.8 MG/DL — HIGH (ref 2.5–4.5)
POTASSIUM SERPL-MCNC: 4.6 MMOL/L — SIGNIFICANT CHANGE UP (ref 3.5–5.3)
POTASSIUM SERPL-SCNC: 4.6 MMOL/L — SIGNIFICANT CHANGE UP (ref 3.5–5.3)
SODIUM SERPL-SCNC: 134 MMOL/L — LOW (ref 135–145)

## 2022-11-17 PROCEDURE — 99233 SBSQ HOSP IP/OBS HIGH 50: CPT | Mod: GC

## 2022-11-17 PROCEDURE — 99239 HOSP IP/OBS DSCHRG MGMT >30: CPT

## 2022-11-17 RX ORDER — CARVEDILOL PHOSPHATE 80 MG/1
1 CAPSULE, EXTENDED RELEASE ORAL
Qty: 0 | Refills: 0 | DISCHARGE
Start: 2022-11-17

## 2022-11-17 RX ORDER — CARVEDILOL PHOSPHATE 80 MG/1
1 CAPSULE, EXTENDED RELEASE ORAL
Qty: 60 | Refills: 0
Start: 2022-11-17 | End: 2022-12-16

## 2022-11-17 RX ORDER — ASPIRIN/CALCIUM CARB/MAGNESIUM 324 MG
1 TABLET ORAL
Qty: 30 | Refills: 0
Start: 2022-11-17 | End: 2022-12-16

## 2022-11-17 RX ORDER — ATORVASTATIN CALCIUM 80 MG/1
1 TABLET, FILM COATED ORAL
Qty: 0 | Refills: 0 | DISCHARGE
Start: 2022-11-17

## 2022-11-17 RX ORDER — ATORVASTATIN CALCIUM 80 MG/1
1 TABLET, FILM COATED ORAL
Qty: 30 | Refills: 0
Start: 2022-11-17 | End: 2022-12-16

## 2022-11-17 RX ORDER — HYDRALAZINE HCL 50 MG
1 TABLET ORAL
Qty: 90 | Refills: 0
Start: 2022-11-17 | End: 2022-12-16

## 2022-11-17 RX ORDER — CLOPIDOGREL BISULFATE 75 MG/1
1 TABLET, FILM COATED ORAL
Qty: 0 | Refills: 0 | DISCHARGE
Start: 2022-11-17

## 2022-11-17 RX ORDER — CLOPIDOGREL BISULFATE 75 MG/1
1 TABLET, FILM COATED ORAL
Qty: 30 | Refills: 0
Start: 2022-11-17 | End: 2022-12-16

## 2022-11-17 RX ADMIN — AMLODIPINE BESYLATE 10 MILLIGRAM(S): 2.5 TABLET ORAL at 05:12

## 2022-11-17 RX ADMIN — Medication 81 MILLIGRAM(S): at 11:52

## 2022-11-17 RX ADMIN — MUPIROCIN 1 APPLICATION(S): 20 OINTMENT TOPICAL at 05:13

## 2022-11-17 RX ADMIN — Medication 100 MILLIGRAM(S): at 14:02

## 2022-11-17 RX ADMIN — CARVEDILOL PHOSPHATE 25 MILLIGRAM(S): 80 CAPSULE, EXTENDED RELEASE ORAL at 05:12

## 2022-11-17 RX ADMIN — Medication 10 UNIT(S): at 08:01

## 2022-11-17 RX ADMIN — Medication 1: at 08:00

## 2022-11-17 RX ADMIN — Medication 1: at 16:52

## 2022-11-17 RX ADMIN — CHLORHEXIDINE GLUCONATE 1 APPLICATION(S): 213 SOLUTION TOPICAL at 11:56

## 2022-11-17 RX ADMIN — Medication 1: at 11:51

## 2022-11-17 RX ADMIN — Medication 10 UNIT(S): at 11:51

## 2022-11-17 RX ADMIN — Medication 100 MILLIGRAM(S): at 05:14

## 2022-11-17 RX ADMIN — Medication 10 UNIT(S): at 16:52

## 2022-11-17 RX ADMIN — CLOPIDOGREL BISULFATE 75 MILLIGRAM(S): 75 TABLET, FILM COATED ORAL at 11:52

## 2022-11-17 NOTE — PROGRESS NOTE ADULT - ASSESSMENT
61 yo male with PMH of HTN, HLD, T2DM who presents as transfer from [facility] for NSTEMI and CHF exacerbation requiring IV diuresis with course c/b RADHA on CKD in setting of diuresis with plan for LHC now deferred to outpatient pending improvement of renal function.

## 2022-11-17 NOTE — DISCHARGE NOTE PROVIDER - PROVIDER TOKENS
PROVIDER:[TOKEN:[[id number]],FOLLOWUP:[1 week]],FREE:[LAST:[Corey],FIRST:[Ali],PHONE:[(   )    -],FAX:[(   )    -],ADDRESS:[[address] [place] [address]],FOLLOWUP:[1 week]]

## 2022-11-17 NOTE — PROGRESS NOTE ADULT - PROBLEM SELECTOR PROBLEM 1
NSTEMI (non-ST elevation myocardial infarction)

## 2022-11-17 NOTE — PROGRESS NOTE ADULT - PROBLEM SELECTOR PLAN 1
NSTEMI at [facility], trops 8700->5000, started on asa/plavix/heparin gtt 11/9/22  - trop 872 here  - s/p hep gtt x 48 hours  - c/w ASA + Plavix  - c/w atorvastatin 80mg qHS  - Cardiology consult appreciated  - LHC pending optimization of volume status and renal function  - given Cr still much higher than baseline, LHC will be deferred to outpatient in 1-2 weeks when Cr improves  - per Cards fellow, patient to call [phone] to scheduled elective cath  - will need to follow-up with his Cardiologist Dr. Berto Nicole within 1 week as well  - patient with no active chest pain/CORCORAN at this time  - repeat TTE with LVEF 57%, eccentric LVH with dilated LV, RV not well visualized

## 2022-11-17 NOTE — PROGRESS NOTE ADULT - PROBLEM SELECTOR PLAN 3
RADHA on CKD, baseline Cr 1.2, has had RADHA to 2.2 before at [facility]. Favor cardiorenal syndrome i/s/o HF exacerbation  - UA, renal/bladder ultrasound - proteinuria, no hydronephrosis  - FEUrea - prerenal etiology. supports cardiorenal etiology.  - uptrending Cr likely now in setting of aggressive diuresis  - Cr plateaued now at 2.06  - diuretics on hold   - avoid nephrotoxic medications, renally dose meds to GFR  - monitor Cr on BMP  - will need repeat BMP to check Cr next Tues/Wed with his PCP to monitor renal function prior to scheduling elective cardiac cath

## 2022-11-17 NOTE — PROGRESS NOTE ADULT - PROBLEM SELECTOR PLAN 7
Treated at OSH with broad spectrum abx for xray opacities but unclear if this represents true PNA vs. pulmonary edema. Repeat CXR 11/10 at OSH "decreased pulmonary edema. Underlying consolidation not excluded. No pneumothorax. Small right pleural effusion, trace left pleural effusion". Report for initial CXR unavailable.   - c/w CTX to complete 5 days (to end 11/12)  - CXR with bilateral opacities R > L, favor pulm edema but unable to exclude infectious etiology
Treated at OSH with broad spectrum abx for xray opacities but unclear if this represents true PNA vs. pulmonary edema. Repeat CXR 11/10 at OSH "decreased pulmonary edema. Underlying consolidation not excluded. No pneumothorax. Small right pleural effusion, trace left pleural effusion". Report for initial CXR unavailable.   - completed 5 day course of ceftriaxone on 11/12  - CXR with bilateral opacities R > L, favor pulm edema but unable to exclude infectious etiology
Treated at OSH with broad spectrum abx for xray opacities but unclear if this represents true PNA vs. pulmonary edema. Repeat CXR 11/10 at OSH "decreased pulmonary edema. Underlying consolidation not excluded. No pneumothorax. Small right pleural effusion, trace left pleural effusion". Report for initial CXR unavailable.   - completed 5 day course of ceftriaxone on 11/12  - CXR with bilateral opacities R > L, favor pulm edema but unable to exclude infectious etiology
Treated at OSH with broad spectrum abx for xray opacities but unclear if this represents true PNA vs. pulmonary edema. Repeat CXR 11/10 at OSH "decreased pulmonary edema. Underlying consolidation not excluded. No pneumothorax. Small right pleural effusion, trace left pleural effusion". Report for initial CXR unavailable.   - c/w CTX to complete 5 days (to end 11/12)  - CXR with bilateral opacities R > L, favor pulm edema but unable to exclude infectious etiology
Treated at OSH with broad spectrum abx for xray opacities but unclear if this represents true PNA vs. pulmonary edema. Repeat CXR 11/10 at OSH "decreased pulmonary edema. Underlying consolidation not excluded. No pneumothorax. Small right pleural effusion, trace left pleural effusion". Report for initial CXR unavailable.   - completed 5 day course of ceftriaxone on 11/12  - CXR with bilateral opacities R > L, favor pulm edema but unable to exclude infectious etiology
Treated at OSH with broad spectrum abx for xray opacities but unclear if this represents true PNA vs. pulmonary edema. Repeat CXR 11/10 at OSH "decreased pulmonary edema. Underlying consolidation not excluded. No pneumothorax. Small right pleural effusion, trace left pleural effusion". Report for initial CXR unavailable.   - completed 5 day course of ceftriaxone on 11/12  - CXR with bilateral opacities R > L, favor pulm edema but unable to exclude infectious etiology
Treated at OSH with broad spectrum abx for xray opacities but unclear if this represents true PNA vs. pulmonary edema. Repeat CXR 11/10 at OSH "decreased pulmonary edema. Underlying consolidation not excluded. No pneumothorax. Small right pleural effusion, trace left pleural effusion". Report for initial CXR unavailable.   - c/w CTX to complete 5 days (to end 11/12)

## 2022-11-17 NOTE — DISCHARGE NOTE NURSING/CASE MANAGEMENT/SOCIAL WORK - PATIENT PORTAL LINK FT
You can access the FollowMyHealth Patient Portal offered by [facility] by registering at the following website: [email or web address]. By joining [facility] FollowMyHealth portal, you will also be able to view your health information using other applications (apps) compatible with our system.

## 2022-11-17 NOTE — DISCHARGE NOTE NURSING/CASE MANAGEMENT/SOCIAL WORK - NSDCPEFALRISK_GEN_ALL_CORE
For information on Fall & Injury Prevention, visit: [email or web address] OR  [email or web address] OR  https://www.cdc.gov/steadi/patient.html

## 2022-11-17 NOTE — PROGRESS NOTE ADULT - PROBLEM SELECTOR PLAN 2
TTE at OSH 11/9/22 showed LVEF 40-45%, LV systolic function was moderately decreased.   - more euvolemic on exam today  - continue to hold diuretics on discharge  - c/w carvedilol to 25mg BID  - c/w hydralazine to 100mg TID  - strict I/Os, daily weights

## 2022-11-17 NOTE — PROGRESS NOTE ADULT - SUBJECTIVE AND OBJECTIVE BOX
Patient seen and examined at bedside.    Overnight Events:     Review Of Systems: No chest pain, shortness of breath, or palpitations            Current Meds:  amLODIPine   Tablet 10 milliGRAM(s) Oral daily  aspirin enteric coated 81 milliGRAM(s) Oral daily  atorvastatin 80 milliGRAM(s) Oral at bedtime  carvedilol 25 milliGRAM(s) Oral every 12 hours  chlorhexidine 2% Cloths 1 Application(s) Topical daily  clopidogrel Tablet 75 milliGRAM(s) Oral daily  dextrose 5%. 1000 milliLiter(s) IV Continuous <Continuous>  dextrose 5%. 1000 milliLiter(s) IV Continuous <Continuous>  dextrose 50% Injectable 25 Gram(s) IV Push once  dextrose 50% Injectable 12.5 Gram(s) IV Push once  dextrose 50% Injectable 25 Gram(s) IV Push once  dextrose Oral Gel 15 Gram(s) Oral once PRN  glucagon  Injectable 1 milliGRAM(s) IntraMuscular once  hydrALAZINE 100 milliGRAM(s) Oral three times a day  insulin glargine Injectable (LANTUS) 45 Unit(s) SubCutaneous at bedtime  insulin lispro (ADMELOG) corrective regimen sliding scale   SubCutaneous three times a day before meals  insulin lispro (ADMELOG) corrective regimen sliding scale   SubCutaneous at bedtime  insulin lispro Injectable (ADMELOG) 10 Unit(s) SubCutaneous three times a day before meals  mupirocin 2% Nasal 1 Application(s) Both Nostrils two times a day  senna 2 Tablet(s) Oral at bedtime      Vitals:  T(F): 98.4 (11-17), Max: 99 (11-17)  HR: 77 (11-17) (58 - 77)  BP: 160/70 (11-17) (154/70 - 167/71)  RR: 18 (11-17)  SpO2: 97% (11-17)  I&O's Summary      Physical Exam:  Appearance: No acute distress; well appearing  Eyes: PERRL, EOMI, pink conjunctiva  HEENT: Normal oral mucosa  Cardiovascular: RRR, S1, S2, no murmurs, rubs, or gallops; no edema; no JVD  Respiratory: Clear to auscultation bilaterally  Gastrointestinal: soft, non-tender, non-distended with normal bowel sounds  Musculoskeletal: No clubbing; no joint deformity   Neurologic: Non-focal  Lymphatic: No lymphadenopathy  Psychiatry: AAOx3, mood & affect appropriate  Skin: No rashes, ecchymoses, or cyanosis                          10.3   9.08  )-----------( 252      ( 16 Nov 2022 06:51 )             31.1     11-17    134<L>  |  100  |  58<H>  ----------------------------<  196<H>  4.6   |  22  |  2.06<H>    Ca    9.2      17 Nov 2022 06:04  Phos  4.8     11-17  Mg     2.6     11-17      PT/INR - ( 16 Nov 2022 06:51 )   PT: 12.7 sec;   INR: 1.09 ratio         PTT - ( 16 Nov 2022 06:51 )  PTT:30.1 sec  CARDIAC MARKERS ( 11 Nov 2022 16:09 )  872 ng/L / x     / x     / 454 U/L / x     / 5.0 ng/mL              New ECG(s): Personally reviewed    Echo:    Stress Testing:     Cath:    Imaging:    Interpretation of Telemetry:   Patient seen and examined at bedside.    Overnight Events: No events. No chest pain, SOB, palpitations, headache.   Tele: sinus 50-60s.        Current Meds:  amLODIPine   Tablet 10 milliGRAM(s) Oral daily  aspirin enteric coated 81 milliGRAM(s) Oral daily  atorvastatin 80 milliGRAM(s) Oral at bedtime  carvedilol 25 milliGRAM(s) Oral every 12 hours  chlorhexidine 2% Cloths 1 Application(s) Topical daily  clopidogrel Tablet 75 milliGRAM(s) Oral daily  dextrose 5%. 1000 milliLiter(s) IV Continuous <Continuous>  dextrose 5%. 1000 milliLiter(s) IV Continuous <Continuous>  dextrose 50% Injectable 25 Gram(s) IV Push once  dextrose 50% Injectable 12.5 Gram(s) IV Push once  dextrose 50% Injectable 25 Gram(s) IV Push once  dextrose Oral Gel 15 Gram(s) Oral once PRN  glucagon  Injectable 1 milliGRAM(s) IntraMuscular once  hydrALAZINE 100 milliGRAM(s) Oral three times a day  insulin glargine Injectable (LANTUS) 45 Unit(s) SubCutaneous at bedtime  insulin lispro (ADMELOG) corrective regimen sliding scale   SubCutaneous three times a day before meals  insulin lispro (ADMELOG) corrective regimen sliding scale   SubCutaneous at bedtime  insulin lispro Injectable (ADMELOG) 10 Unit(s) SubCutaneous three times a day before meals  mupirocin 2% Nasal 1 Application(s) Both Nostrils two times a day  senna 2 Tablet(s) Oral at bedtime      Vitals:  T(F): 98.4 (11-17), Max: 99 (11-17)  HR: 77 (11-17) (58 - 77)  BP: 160/70 (11-17) (154/70 - 167/71)  RR: 18 (11-17)  SpO2: 97% (11-17)  I&O's Summary      Physical Exam:  Appearance: No acute distress; well appearing  Eyes: PERRL, EOMI, pink conjunctiva  HEENT: Normal oral mucosa  Cardiovascular: RRR, S1, S2, no murmurs, rubs, or gallops; no JVD appreciated.   Respiratory: Clear to auscultation bilaterally  Gastrointestinal: soft, non-tender, non-distended with normal bowel sounds  Neurologic: AAOx3, Non-focal  Ext: 2+ b/l pitting edema up to knees.                        10.3   9.08  )-----------( 252      ( 16 Nov 2022 06:51 )             31.1     11-17    134<L>  |  100  |  58<H>  ----------------------------<  196<H>  4.6   |  22  |  2.06<H>    Ca    9.2      17 Nov 2022 06:04  Phos  4.8     11-17  Mg     2.6     11-17      PT/INR - ( 16 Nov 2022 06:51 )   PT: 12.7 sec;   INR: 1.09 ratio         PTT - ( 16 Nov 2022 06:51 )  PTT:30.1 sec  CARDIAC MARKERS ( 11 Nov 2022 16:09 )  872 ng/L / x     / x     / 454 U/L / x     / 5.0 ng/mL      < from: TTE with Doppler (w/Cont) (11.14.22 @ 10:02) >  ------------------------------------------------------------------------  PROCEDURE: Transthoracic echocardiogram with 2-D, M-Mode  and complete spectral and color flow Doppler. Verbal  consent was obtained for injection of  Ultrasonic Enhancing  Agent following a discussion of risks and benefits.  Following intravenous injection of Ultrasonic Enhancing  Agent, harmonic imaging was performed.  INDICATION: Heart failure, unspecified (I50.9)  ------------------------------------------------------------------------  Dimensions:    Normal Values:  LA:     4.2    2.0 - 4.0 cm  Ao:     3.3    2.0 - 3.8 cm  SEPTUM: 1.0    0.6 - 1.2 cm  PWT:    1.1    0.6 - 1.1 cm  LVIDd:  6.6    3.0 - 5.6 cm  LVIDs:  3.4    1.8 - 4.0 cm  Derived variables:  LVMI: 134 g/m2  RWT: 0.33  Fractional short: 48 %  EF ([place] Method): 57 %Doppler Peak Velocity (m/sec):  AoV=2.2  ------------------------------------------------------------------------  Observations:  Mitral Valve: Mitral annular calcification, otherwise  normal mitral valve.  Aortic Valve/Aorta: Normal trileaflet aortic valve. Peak  transaortic valve gradient equals 19 mm Hg, mean  transaortic valve gradient equals 12 mm Hg, aortic valve  velocity time integral equals 51 cm, estimated aortic valve  area equals 2.3 sqcm. Peak left ventricular outflow tract  gradient equals 5 mm Hg, mean gradient is equal to 3 mm Hg,  LVOT velocity time integral equals 28 cm.  Aortic Root: 3.3 cm.  Ascending Aorta: 3.3 cm.  LVOT diameter: 2.3 cm.  Left Atrium: Normal left atrium.  LA volume index = 29  cc/m2.  Left Ventricle: Overall preserved left ventricular ejection  fraction. Regional variation naoted  Eccentric left  ventricular hypertrophy (dilated left ventricle with normal  relative wall thickness). Normal LV filling pressure.  Right Heart: Right atrium not well visualized. The right  ventricle is not well visualized. Tricuspid valve not well  visualized. Pulmonic valve not well visualized.  Pericardium/Pleura: Normal pericardium with no pericardial  effusion.  Hemodynamic: Estimated right atrial pressure is 8 mm Hg.  ------------------------------------------------------------------------  Conclusions:  1. Eccentric left ventricular hypertrophy (dilated left  ventricle with normal relative wall thickness).  2. Overall preserved left ventricular ejection fraction.  Regional variation naoted  3. The right ventricle is not well visualized.  *** No previous Echo exam.  ------------------------------------------------------------------------  Confirmed on  11/14/2022 - 19:51:45 by Lea Mayes M.D.FASE  ------------------------------------------------------------------------    < end of copied text >

## 2022-11-17 NOTE — DISCHARGE NOTE PROVIDER - NSDCFUADDAPPT_GEN_ALL_CORE_FT
Call to Dr. Brannon Nelson regarding hypertension and tachycardia. Call to office [phone], waiting x2 for excess of 5 minutes. No answer. Contacted answering service who stated they are in the office today. Transport present to take patient to endo. APPTS ARE READY TO BE MADE: [x] YES    Best Family or Patient Contact (if needed):    Additional Information about above appointments (if needed):    1: Nain Tian in 1 week for blood work to check kidney functoin  2:   3:     Other comments or requests:    APPTS ARE READY TO BE MADE: [x] YES    Best Family or Patient Contact (if needed):    Additional Information about above appointments (if needed):    1: Nain Tian in 1 week for blood work to check kidney functoin  2: Follow up with Cardiologist - Dr. Nicole in 1 week   3:     Other comments or requests:    APPTS ARE READY TO BE MADE: [x] YES    Best Family or Patient Contact (if needed):    Additional Information about above appointments (if needed):    1: Nain Tian in 1 week for blood work to check kidney functoin  2: Follow up with Cardiologist - Dr. Nicole in 1 week   3: Need to schedule elective cardiac cath in 1-2 weeks [phone]    Other comments or requests:    APPTS ARE READY TO BE MADE: [x] YES    Best Family or Patient Contact (if needed):    Additional Information about above appointments (if needed):    1: Nain Tian in 1 week for blood work to check kidney functoin  2: Follow up with Cardiologist - Dr. Nicole in 1 week   3: Need to schedule elective cardiac cath in 1-2 weeks [phone]    Other comments or requests:   Patient was provided with follow up request details for Dr. Nain Jacobsen and Dr. Berto Nicole. Patient was advised to call to schedule follow up within specified time frame.

## 2022-11-17 NOTE — DISCHARGE NOTE PROVIDER - NSDCMRMEDTOKEN_GEN_ALL_CORE_FT
amLODIPine 10 mg oral tablet: 1 tab(s) orally once a day  home/hospital  aspirin 81 mg oral delayed release tablet: 1 tab(s) orally once a day  home/hospital  Colace 100 mg oral capsule: 1 cap(s) orally 2 times a day  HOME  HumaLOG 100 units/mL subcutaneous solution: 10 unit(s) subcutaneous 3 times a day  home  insulin glargine 100 units/mL subcutaneous solution: 47 unit(s) subcutaneous once a day (at bedtime)  home  losartan 50 mg oral tablet: 1 tab(s) orally once a day  home  Senna 8.6 mg oral tablet: 1 tab(s) orally once a day (at bedtime)  HOME  torsemide 20 mg oral tablet: 1 tab(s) orally once a day  HOME   amLODIPine 10 mg oral tablet: 1 tab(s) orally once a day    aspirin 81 mg oral delayed release tablet: 1 tab(s) orally once a day    atorvastatin 80 mg oral tablet: 1 tab(s) orally once a day (at bedtime)  carvedilol 25 mg oral tablet: 1 tab(s) orally every 12 hours  clopidogrel 75 mg oral tablet: 1 tab(s) orally once a day  Colace 100 mg oral capsule: 1 cap(s) orally 2 times a day    HumaLOG 100 units/mL subcutaneous solution: 10 unit(s) subcutaneous 3 times a day    hydrALAZINE 100 mg oral tablet: 1 tab(s) orally 3 times a day  insulin glargine 100 units/mL subcutaneous solution: 47 unit(s) subcutaneous once a day (at bedtime)    mupirocin 2% nasal ointment: 1 application in each nostril 2 times a day  x 4 more days  Senna 8.6 mg oral tablet: 1 tab(s) orally once a day (at bedtime)

## 2022-11-17 NOTE — PROGRESS NOTE ADULT - PROBLEM/PLAN-8
Addended by: Corin Williamson on: 7/29/2019 02:00 PM     Modules accepted: Juanito
DISPLAY PLAN FREE TEXT

## 2022-11-17 NOTE — DISCHARGE NOTE PROVIDER - HOSPITAL COURSE
63 yo male with PMH of HTN, HLD, T2DM who presents as transfer from [facility] for NSTEMI and CHF exacerbation requiring IV diuresis with course c/b RADHA on CKD - baseline 1.31 5/24/21 in setting of diuresis      Problem/Plan - 1:  ·  Problem: NSTEMI (non-ST elevation myocardial infarction).  NSTEMI at [facility], trops 8700->5000, s/p Heparin gtt 11/9/22  - trop improved to 872 here  - TTE with LVEF 57%, eccentric LVH with dilated LV, RV not well visualized.  - s/p hep gtt x 48 hours  - Continue ASA + Plavix  - Continue Atorvastatin 80mg qHS  - Given increase in creatinine, will have to hold off cardiac cath for now.   - Follow up as outpatient - will need renal optimization for cardiac cath as outpt  - Follow up with PMD in 1 week (next Tues or Wednesday for blood work to follow up kidney function)       Problem/Plan - 2:  ·  Problem: Acute systolic congestive heart failure.  TTE at OSH 11/9/22 showed LVEF 40-45%, LV systolic function was moderately decreased.   - s/p Bumex IV   - more euvolemic on exam   - hold bumex  given RADHA on CKD  - Continue carvedilol to 25mg BID  - Increased hydralazine to 100mg TID  - strict I/Os, daily weights.     Problem/Plan - 3:  ·  Problem: RADHA (acute kidney injury)  RADHA on CKD, baseline Cr 1.2, has had RADHA to 2.2 before at [facility]. Favor cardiorenal syndrome i/s/o HF exacerbation  - UA, renal/bladder ultrasound - proteinuria, no hydronephrosis  - FEUrea - prerenal etiology. supports cardiorenal etiology.  - Uptrending Cr today likely in setting of diuresis  - diuretics on hold now  - avoid nephrotoxic medications, renally dose meds to GFR  - Follow up with PMD in 1 week (next Tues or Wednesday for blood work to follow up kidney function)     Problem/Plan - 4:  ·  Problem: Hypertension - Uncontrolled hypertension.  - Continue amlodipine 10mg daily   - Home losartan 50mg daily on hold given RADHA  - Continue carvedilol to 25 mg q12h  - Increase hydralazine to 100mg TID  -  Follow up with PMD in 1 week for b/p check     Problem/Plan - 5:  ·  Problem: Hyperlipidemia.  - Conitnue high intensity statin.     Problem/Plan - 6:  ·  Problem: Type 2 diabetes mellitus.   Home basal 47u, bolus 10u  - HbA1c 9.4%  - Increase lantus to 45 units qHS  - pre-meal admelog 10 units TID qAC, hold if NPO     Problem/Plan - 7:  ·  Problem: Pneumonia.   Treated at OSH with broad spectrum abx for xray opacities but unclear if this represents true PNA vs. pulmonary edema. Repeat CXR 11/10 at OSH "decreased pulmonary edema. Underlying consolidation not excluded. No pneumothorax. Small right pleural effusion, trace left pleural effusion". Report for initial CXR unavailable.   - completed 5 day course of ceftriaxone on 11/12  - CXR with bilateral opacities R > L, favor pulm edema but unable to exclude infectious etiology.    Symptoms improved with diuresis. No active chest pain thus optimizing volume status and managing RADHA on CKD. Stopped diuretic and creatinine continues to gradually improved. As has had no symptoms in prolonged period of time, seems best to defer angiography until renal function fully back to baseline.  Plan coronary angiography once renally optimized, follow as outpatient and return electively, likely in another 1 to 2 weeks. 63 yo male with PMH of HTN, HLD, T2DM who presents as transfer from [facility] for NSTEMI and CHF exacerbation requiring IV diuresis with course c/b RADHA on CKD - baseline 1.31 5/24/21 in setting of diuresis      Problem/Plan - 1:  ·  Problem: NSTEMI (non-ST elevation myocardial infarction).  NSTEMI at [facility], trops 8700->5000, s/p Heparin gtt 11/9/22  - trop improved to 872 here  - TTE with LVEF 57%, eccentric LVH with dilated LV, RV not well visualized.  - s/p hep gtt x 48 hours  - Continue ASA + Plavix  - Continue Atorvastatin 80mg qHS  - Given increase in creatinine, will have to hold off cardiac cath for now.   - Follow up as outpatient - will need renal optimization for cardiac cath as outpt  - Follow up with PMD in 1 week (next Tues or Wednesday for blood work to follow up kidney function)       Problem/Plan - 2:  ·  Problem: Acute systolic congestive heart failure.  TTE at OSH 11/9/22 showed LVEF 40-45%, LV systolic function was moderately decreased.   - s/p Bumex IV   - more euvolemic on exam   - hold bumex  given RADHA on CKD  - Continue carvedilol to 25mg BID  - Increased hydralazine to 100mg TID  - strict I/Os, daily weights.     Problem/Plan - 3:  ·  Problem: RADHA (acute kidney injury)  RADHA on CKD, baseline Cr 1.2, has had RADHA to 2.2 before at [facility]. Favor cardiorenal syndrome i/s/o HF exacerbation  - UA, renal/bladder ultrasound - proteinuria, no hydronephrosis  - FEUrea - prerenal etiology. supports cardiorenal etiology.  - Uptrending Cr today likely in setting of diuresis  - diuretics on hold now  - avoid nephrotoxic medications, renally dose meds to GFR  - Follow up with PMD in 1 week (next Tues or Wednesday for blood work to follow up kidney function)     Problem/Plan - 4:  ·  Problem: Hypertension - Uncontrolled hypertension.  - Continue amlodipine 10mg daily   - Home losartan 50mg daily on hold given RADHA  - Continue carvedilol to 25 mg q12h  - Increase hydralazine to 100mg TID  -  Follow up with PMD in 1 week for b/p check     Problem/Plan - 5:  ·  Problem: Hyperlipidemia.  - Conitnue high intensity statin.     Problem/Plan - 6:  ·  Problem: Type 2 diabetes mellitus.   Home basal 47u, bolus 10u  - HbA1c 9.4%  - Lantus to 45 units qHS  - pre-meal admelog 10 units TID qAC, hold if NPO     Problem/Plan - 7:  ·  Problem: Pneumonia.   Treated at OSH with broad spectrum abx for xray opacities but unclear if this represents true PNA vs. pulmonary edema. Repeat CXR 11/10 at OSH "decreased pulmonary edema. Underlying consolidation not excluded. No pneumothorax. Small right pleural effusion, trace left pleural effusion". Report for initial CXR unavailable.   - completed 5 day course of ceftriaxone on 11/12  - CXR with bilateral opacities R > L, favor pulm edema but unable to exclude infectious etiology.    Symptoms improved with diuresis. No active chest pain thus optimizing volume status and managing RADHA on CKD. Stopped diuretic and creatinine continues to gradually improved. As has had no symptoms in prolonged period of time, seems best to defer angiography until renal function fully back to baseline.  Plan coronary angiography once renally optimized, follow as outpatient and return electively, likely in another 1 to 2 weeks. 61 yo male with PMH of HTN, HLD, T2DM who presents as transfer from [facility] for NSTEMI and CHF exacerbation requiring IV diuresis with course c/b RADHA on CKD - baseline 1.31 5/24/21 in setting of diuresis      Problem/Plan - 1:  ·  Problem: NSTEMI (non-ST elevation myocardial infarction).  NSTEMI at [facility], trops 8700->5000, s/p Heparin gtt 11/9/22  - trop improved to 872 here  - TTE with LVEF 57%, eccentric LVH with dilated LV, RV not well visualized.  - s/p hep gtt x 48 hours  - Continue ASA + Plavix  - Continue Atorvastatin 80mg qHS  - Given increase in creatinine, will have to hold off cardiac cath for now.   - Follow up as outpatient - will need renal optimization for cardiac cath as outpt  - Follow up with PMD in 1 week (next Tues or Wednesday for blood work to follow up kidney function)       Problem/Plan - 2:  ·  Problem: Acute systolic congestive heart failure.  TTE at OSH 11/9/22 showed LVEF 40-45%, LV systolic function was moderately decreased.   - s/p Bumex IV   - more euvolemic on exam   - hold bumex  given RADHA on CKD  - Continue carvedilol 25mg BID  - Continue hydralazine 100mg TID  - strict I/Os, daily weights.     Problem/Plan - 3:  ·  Problem: RADHA (acute kidney injury)  RADHA on CKD, baseline Cr 1.2, has had RADHA to 2.2 before at [facility]. Favor cardiorenal syndrome i/s/o HF exacerbation  - UA, renal/bladder ultrasound - proteinuria, no hydronephrosis  - FEUrea - prerenal etiology. supports cardiorenal etiology.  - Uptrending Cr today likely in setting of diuresis  - diuretics on hold now  - avoid nephrotoxic medications, renally dose meds to GFR  - Follow up with PMD in 1 week (next Tues or Wednesday for blood work to follow up kidney function)     Problem/Plan - 4:  ·  Problem: Hypertension - Uncontrolled hypertension.  - Continue amlodipine 10mg daily   - Home losartan 50mg daily on hold given RADHA  - Continue carvedilol to 25 mg q12h  - Continue hydralazine to 100mg TID  -  Follow up with PMD in 1 week for b/p check     Problem/Plan - 5:  ·  Problem: Hyperlipidemia.  - Conitnue high intensity statin.     Problem/Plan - 6:  ·  Problem: Type 2 diabetes mellitus.   Home basal 47u, bolus 10u  - HbA1c 9.4%  - Lantus to 45 units qHS  - pre-meal admelog 10 units TID qAC, hold if NPO     Problem/Plan - 7:  ·  Problem: Pneumonia.   Treated at OSH with broad spectrum abx for xray opacities but unclear if this represents true PNA vs. pulmonary edema. Repeat CXR 11/10 at OSH "decreased pulmonary edema. Underlying consolidation not excluded. No pneumothorax. Small right pleural effusion, trace left pleural effusion". Report for initial CXR unavailable.   - completed 5 day course of ceftriaxone on 11/12  - CXR with bilateral opacities R > L, favor pulm edema but unable to exclude infectious etiology.    Symptoms improved with diuresis. No active chest pain thus optimizing volume status and managing RADHA on CKD. Stopped diuretic and creatinine continues to gradually improved. As has had no symptoms in prolonged period of time, seems best to defer angiography until renal function fully back to baseline.  Plan coronary angiography once renally optimized, follow as outpatient and return electively, likely in another 1 to 2 weeks.

## 2022-11-17 NOTE — PHARMACOTHERAPY INTERVENTION NOTE - NSPHARMCOMMPTEDU
[FreeTextEntry1] : Medical history reviewed.  He has been told of an elevated BP in the past.  It is now borderline.  Advise diet, exercise and weight loss and monitor the BP.  Check lipids and a HgBA1c.\par \par He says he is stable on medications for ADD and anxiety.  \par \par He was advised to see a dermatologist routinely since his father  of melanoma at age 54.\par \par He has had the COVID-19 vaccine.  \par \par He requests a testosterone and estradiol level- was told they were off in the past.\par \par Vaccines likely UTD-was in the .  Patient Education - Discharge Counseling

## 2022-11-17 NOTE — PROGRESS NOTE ADULT - SUBJECTIVE AND OBJECTIVE BOX
Kamila Shahid MD  Division of Hospital Medicine  [facility]   Available on [employer] Teams (Mon-Fri 8am-5pm)    * messages preferred prior to calls  Other Times:  [phone]      Patient is a 62y old  Male who presents with a chief complaint of nstemi (13 Nov 2022 10:31)      SUBJECTIVE / OVERNIGHT EVENTS: no acute events overnight. no fever, chills, chest pain, nor dyspnea. ambulating around the unit with no issues. not getting good sleep because of roommate but otherwise without complaints.  ADDITIONAL REVIEW OF SYSTEMS:    MEDICATIONS  (STANDING):  amLODIPine   Tablet 10 milliGRAM(s) Oral daily  aspirin enteric coated 81 milliGRAM(s) Oral daily  atorvastatin 80 milliGRAM(s) Oral at bedtime  carvedilol 25 milliGRAM(s) Oral every 12 hours  chlorhexidine 2% Cloths 1 Application(s) Topical daily  clopidogrel Tablet 75 milliGRAM(s) Oral daily  dextrose 5%. 1000 milliLiter(s) (50 mL/Hr) IV Continuous <Continuous>  dextrose 5%. 1000 milliLiter(s) (100 mL/Hr) IV Continuous <Continuous>  dextrose 50% Injectable 25 Gram(s) IV Push once  dextrose 50% Injectable 12.5 Gram(s) IV Push once  dextrose 50% Injectable 25 Gram(s) IV Push once  glucagon  Injectable 1 milliGRAM(s) IntraMuscular once  hydrALAZINE 100 milliGRAM(s) Oral three times a day  insulin glargine Injectable (LANTUS) 45 Unit(s) SubCutaneous at bedtime  insulin lispro (ADMELOG) corrective regimen sliding scale   SubCutaneous three times a day before meals  insulin lispro (ADMELOG) corrective regimen sliding scale   SubCutaneous at bedtime  insulin lispro Injectable (ADMELOG) 10 Unit(s) SubCutaneous three times a day before meals  mupirocin 2% Nasal 1 Application(s) Both Nostrils two times a day  senna 2 Tablet(s) Oral at bedtime    MEDICATIONS  (PRN):  dextrose Oral Gel 15 Gram(s) Oral once PRN Blood Glucose LESS THAN 70 milliGRAM(s)/deciliter      CAPILLARY BLOOD GLUCOSE      POCT Blood Glucose.: 180 mg/dL (17 Nov 2022 11:49)  POCT Blood Glucose.: 200 mg/dL (17 Nov 2022 07:27)  POCT Blood Glucose.: 218 mg/dL (16 Nov 2022 21:12)  POCT Blood Glucose.: 141 mg/dL (16 Nov 2022 16:20)    I&O's Summary    17 Nov 2022 07:01  -  17 Nov 2022 14:45  --------------------------------------------------------  IN: 600 mL / OUT: 2000 mL / NET: -1400 mL        PHYSICAL EXAM:  Vital Signs Last 24 Hrs  T(C): 36.8 (17 Nov 2022 11:13), Max: 37.2 (17 Nov 2022 00:21)  T(F): 98.3 (17 Nov 2022 11:13), Max: 99 (17 Nov 2022 00:21)  HR: 65 (17 Nov 2022 11:13) (59 - 77)  BP: 169/69 (17 Nov 2022 11:13) (153/62 - 169/69)  BP(mean): --  RR: 18 (17 Nov 2022 11:13) (18 - 18)  SpO2: 96% (17 Nov 2022 11:13) (95% - 98%)    Parameters below as of 17 Nov 2022 11:13  Patient On (Oxygen Delivery Method): room air        CONSTITUTIONAL: NAD, well-developed, well-groomed  EYES: PERRLA; conjunctiva and sclera clear  ENMT: Moist oral mucosa, no pharyngeal injection or exudates; normal dentition  NECK: Supple, no palpable masses; no JVD  RESPIRATORY: Normal respiratory effort; lungs clear to auscultation b/l with no crackles on exam  CARDIOVASCULAR: Regular rate and rhythm, normal S1 and S2, no murmur/rub/gallop; trace to 1+ pitting lower extremity edema b/l improved; Peripheral pulses are 2+ bilaterally  ABDOMEN: Soft, Nondistended, Nontender to palpation, normoactive bowel sounds  MUSCULOSKELETAL:  No clubbing or cyanosis of digits; no joint swelling or tenderness to palpation  PSYCH: A+O to person, place, and time; affect appropriate  NEUROLOGY: CN 2-12 are intact and symmetric; no gross sensory deficits   SKIN: No rashes; no palpable lesions, +LE wrapped in ACE bandaging for compression    LABS:                        10.3   9.08  )-----------( 252      ( 16 Nov 2022 06:51 )             31.1     11-17    134<L>  |  100  |  58<H>  ----------------------------<  196<H>  4.6   |  22  |  2.06<H>    Ca    9.2      17 Nov 2022 06:04  Phos  4.8     11-17  Mg     2.6     11-17      PT/INR - ( 16 Nov 2022 06:51 )   PT: 12.7 sec;   INR: 1.09 ratio         PTT - ( 16 Nov 2022 06:51 )  PTT:30.1 sec          RADIOLOGY & ADDITIONAL TESTS:  Results Reviewed: no leukocytosis H/H stable, Cr stable  Imaging Personally Reviewed:  Electrocardiogram Personally Reviewed:    COORDINATION OF CARE:  Care Discussed with Consultants/Other Providers [Y]: Donte Tate medicine TRELL Bang  Prior or Outpatient Records Reviewed [Y/N]:

## 2022-11-17 NOTE — PHARMACOTHERAPY INTERVENTION NOTE - COMMENTS
Counseled patient  on the following inpatient/discharge medications names (brand/generic), indication, and possible side effects:  aspirin 81mg daily  plavix 75mg daily  hydralazine 100mg three times a day  atorvastatin 80mg daily  carvedilol 25mg twice daily    Patient was provided with a medication card for their new medication. Patient questions and concerns were answered and addressed. Patient demonstrated understanding.    Shira Ayala, PharmD, [facility]  Clinical Pharmacy Specialist  [phone] or Teams

## 2022-11-17 NOTE — DISCHARGE NOTE NURSING/CASE MANAGEMENT/SOCIAL WORK - NSDCFUADDAPPT_GEN_ALL_CORE_FT
APPTS ARE READY TO BE MADE: [x] YES    Best Family or Patient Contact (if needed):    Additional Information about above appointments (if needed):    1: Nain Tian in 1 week for blood work to check kidney functoin  2: Follow up with Cardiologist - Dr. Nicole in 1 week   3: Need to schedule elective cardiac cath in 1-2 weeks [phone]    Other comments or requests:

## 2022-11-17 NOTE — PROGRESS NOTE ADULT - PROBLEM SELECTOR PLAN 6
Home basal 47u, bolus 10u  - HbA1c 9.4%  - c/w lantus to 45 units qHS  - c/w pre-meal admelog 10 units TID qAC, hold if NPO  - c/w sliding scale  - monitor FS qAC + qHS  - resume home regimen on discharge

## 2022-11-17 NOTE — PROGRESS NOTE ADULT - ATTENDING COMMENTS
62 year old male with history of DM, HTN who presented to [facility] with SOB and chest discomfort found to have elevated troponins, transferred to [facility] for management of ADHF and NSTEMI.    Since admission to [facility], pt has been undergoing diuresis with improvement of his symptoms. He states that his SOB is much improved    He still has 2+ LE edema up to lower thighs b/l. He has positive JVD but his lungs sound clear.    BUN/Cr 67/2.05 improving to 57/1.89  Trop 872, reportedly 8000s to 5000s in [facility]    1) ADHF  2) NSTEMI  3) ?RADHA on CKD (baseline 1.31 5/24/21)  - s/p hep gtt x 48 hours  - continue DAPT  - continue atorva 80  - continue metoprolol tartrate 50 bid`  - still appears volume overloaded, agree with increased diuretic dosing: Bumex 2mg IV bid  - daily weights/Is/Os  - f/u formal TTE to assess LV function  - Pending renal stabilization and volume optimization, he will need C sometime this week
62 year old male with history of DM, HTN who presented to [facility] with SOB and chest discomfort found to have elevated troponins, transferred to [facility] for management of ADHF and NSTEMI    Since admission to [facility], pt has been undergoing diuresis with improvement of his symptoms. He states that his SOB is much improved    He has 2+ LE edema up to lower thighs b/l. He has positive JVD but his lungs sound clear.    BUN/Cr 67/2.05  Trop 872, reportedly 8000s to 5000s in [facility]    1) ADHF  2) NSTEMI  3) ?RADHA on CKD (baseline 1.31 5/24/21)  - continue DAPT and heparin gtt (ok to stop heparin after 48 hours)  - continue atorva 80  - continue metoprolol tartrate 50 bid  - still appears volume overloaded, continue diuresis with Lasix 40 IV bid  - f/u formal TTE to assess LV function  - Pending renal stabilization and volume optimization, he will need LHC sometime this week
62 year old man, DM, HTN transferred from [facility] SOB and chest discomfort, elevated troponin, ADHF and NSTEMI. Succeeding with diuresis and symptoms improving. No active chest pain thus optimizing volume status and managing RADHA on CKD - baseline 1.31 5/24/21. Plan coronary angiography once optimized. Stopped diuretic and creatinine continues to gradually improved. As has had no symptoms in prolonged period of time, seems best to defer angiography until renal function fully back to baseline, thus consideration for discharge, follow as outpatient and return electively, likely in another 1 to 2 weeks.    To contact call Cardiology Fellow or Attending as listed on [email or web address] password: [email or web address].
62 year old man, DM, HTN transferred from [facility] SOB and chest discomfort, elevated troponin, ADHF and NSTEMI. Seeking diuresis and symptoms improving. No active chest pain thus optimizing volume status and managing RADHA on CKD - baseline 1.31 5/24/21. Plan coronary angiography once optimized.    To contact call Cardiology Fellow or Attending as listed on [email or web address] password: [email or web address].
62 year old man, DM, HTN transferred from [facility] SOB and chest discomfort, elevated troponin, ADHF and NSTEMI. Succeeding with diuresis and symptoms improving. No active chest pain thus optimizing volume status and managing RADHA on CKD - baseline 1.31 5/24/21. Plan coronary angiography once optimized, probably tomorrow.    To contact call Cardiology Fellow or Attending as listed on [email or web address] password: [email or web address].
62 year old man, DM, HTN transferred from [facility] SOB and chest discomfort, elevated troponin, ADHF and NSTEMI. Succeeding with diuresis and symptoms improving. No active chest pain thus optimizing volume status and managing RADHA on CKD - baseline 1.31 5/24/21. Plan coronary angiography once optimized, now stopping diuretic and hopefully ready for tomorrow.    To contact call Cardiology Fellow or Attending as listed on [email or web address] password: [email or web address].

## 2022-11-17 NOTE — DISCHARGE NOTE PROVIDER - NSDCFUADDINST_GEN_ALL_CORE_FT
- Follow up with PMD Dr. Jacobsen in 1 week (next Tues or Wednesday for blood work to follow up kidney function) - Follow up with Cardiologist - Dr. Nicole in 1 week and schedule for cardiac cath with [facility] interventional cardiology team - [phone]   - Take all discharge paperwork to all doctor's visit

## 2022-11-17 NOTE — DISCHARGE NOTE PROVIDER - NSDCCPCAREPLAN_GEN_ALL_CORE_FT
PRINCIPAL DISCHARGE DIAGNOSIS  Diagnosis: NSTEMI (non-ST elevation myocardial infarction)  Assessment and Plan of Treatment: NSTEMI at [facility], trops 8700->5000, s/p Heparin gtt 11/9/22  - trop improved to 872 here  - TTE with LVEF 57%, eccentric LVH with dilated LV, RV not well visualized.  - s/p hep gtt x 48 hours  - Continue ASA + Plavix  - Continue Atorvastatin 80mg qHS  - Given increase in creatinine, will have to hold off cardiac cath for now.   - Follow up as outpatient - will need renal optimization for cardiac cath as outpt  - Follow up with PMD in 1 week (next Tues or Wednesday for blood work to follow up kidney function)        SECONDARY DISCHARGE DIAGNOSES  Diagnosis: Acute systolic congestive heart failure  Assessment and Plan of Treatment: Problem/Plan - 2:  ·  Problem: Acute systolic congestive heart failure.  TTE at OSH 11/9/22 showed LVEF 40-45%, LV systolic function was moderately decreased.   - s/p Bumex IV   - more euvolemic on exam   - hold bumex  given RADHA on CKD  - Continue carvedilol to 25mg BID  - Increased hydralazine to 100mg TID  - strict I/Os, daily weights.    Diagnosis: RADHA (acute kidney injury)  Assessment and Plan of Treatment: RADHA (acute kidney injury)  RADHA on CKD, baseline Cr 1.2, has had RADHA to 2.2 before at [facility]. Favor cardiorenal syndrome i/s/o HF exacerbation  - UA, renal/bladder ultrasound - proteinuria, no hydronephrosis  - FEUrea - prerenal etiology. supports cardiorenal etiology.  - Uptrending Cr today likely in setting of diuresis  - diuretics on hold now  - avoid nephrotoxic medications, renally dose meds to GFR  - Follow up with PMD in 1 week (next Tues or Wednesday for blood work to follow up kidney function)   Problem/Plan - 4:  ·  Problem: Hypertension - Uncontrolled hypertension.  - Continue amlodipine 10mg daily   - Home losartan 50mg daily on hold given RADHA  - Continue carvedilol to 25 mg q12h  - Increase hydralazine to 100mg TID  -  Follow up with PMD in 1 week for b/p check   Problem/Plan - 5:  ·  Problem: Hyperlipidemia.  - Conitnue high intensity statin.   Problem/Plan - 6:  ·  Problem: Type 2 diabetes mellitus.   Home basal 47u, bolus 10u  - HbA1c 9.4%  - Lantus to 45 units qHS  - pre-meal admelog 10 units TID qAC, hold if NPO   Problem/Plan - 7:  ·  Problem: Pneumonia.   Treated at OSH with broad spectrum abx for xray opacities but unclear if this represents true PNA vs. pulmonary edema. Repeat CXR 11/10 at OSH "decreased pulmonary edema. Underlying consolidation not excluded. No pneumothorax. Small right pleural effusion, trace left pleural effusion". Report for initial CXR unavailable.   - completed 5 day course of ceftriaxone on 11/12  - CXR with bilateral opacities R > L, favor pulm edema but unable to exclude infectious etiology.     PRINCIPAL DISCHARGE DIAGNOSIS  Diagnosis: NSTEMI (non-ST elevation myocardial infarction)  Assessment and Plan of Treatment: NSTEMI at [facility], trops 8700->5000, s/p Heparin gtt 11/9/22  - trop improved to 872 here  - TTE with LVEF 57%, eccentric LVH with dilated LV, RV not well visualized.  - s/p hep gtt x 48 hours  - Continue ASA + Plavix  - Continue Atorvastatin 80mg qHS  - Given increase in creatinine, will have to hold off cardiac cath for now.   - Follow up as outpatient - will need renal optimization for cardiac cath as outpt  - Follow up with PMD in 1 week (next Tues or Wednesday for blood work to follow up kidney function)        SECONDARY DISCHARGE DIAGNOSES  Diagnosis: Acute systolic congestive heart failure  Assessment and Plan of Treatment: Problem/Plan - 2:  ·  Problem: Acute systolic congestive heart failure.  TTE at OSH 11/9/22 showed LVEF 40-45%, LV systolic function was moderately decreased.   - s/p Bumex IV   - more euvolemic on exam   - hold bumex  given RADHA on CKD  - Continue carvedilol to 25mg BID  - Increased hydralazine to 100mg TID  - strict I/Os, daily weights.    Diagnosis: RADHA (acute kidney injury)  Assessment and Plan of Treatment: Your creatinine on day of discharge was 2.05  Water pill is on hold  -Follow up with PMD in 1 week (next Tues or Wednesday for blood work to follow up kidney function)  Avoid taking (NSAIDs) - (ex: Ibuprofen, Advil, Celebrex, Naprosyn)  Avoid taking any nephrotoxic agents (can harm kidneys) - Intravenous contrast for diagnostic testing, combination cold medications.  Have all medications adjusted for your renal function by your Health Care Provider.  Blood pressure control is important.  Take all medication as prescribed.      Diagnosis: Hypertension  Assessment and Plan of Treatment: - Continue amlodipine 10mg daily   - HOLD LOSARTAN due to renal failure  - Continue carvedilol to 25 mg q12h  - Increase hydralazine to 100mg TID  -  Follow up with PMD in 1 week for b/p check  Take medications for your blood pressure as recommended.  Eat a heart healthy diet that is low in saturated fats and salt, and includes whole grains, fruits, vegetables and lean protein   Exercise regularly (consult with your physician or cardiologist first); maintain a heart healthy weight.   If you smoke - quit (A resource to help you stop smoking is the [facility] [employer] – phone number 874-968-3306.). Continue to follow with your primary physician or cardiologist.   Seek medical help for dizziness, Lightheadedness, Blurry vision, Headache, Chest pain, Shortness of breath  Follow up with your medical doctor to establish long term blood pressure treatment goals.      Diagnosis: Hyperlipidemia  Assessment and Plan of Treatment: Continue Atorvastatin  Continue with your cholesterol medications. Eat a heart healthy diet that is low in saturated fats and salt, and includes whole grains, fruits, vegetables and lean protein; exercise regularly (consult with your physician or cardiologist first); maintain a heart healthy weight; if you smoke - quit (A resource to help you stop smoking is the [facility] [employer] – phone number 361-634-2809.). Continue to follow with your primary physician or cardiologist.  Seek medical help for dizziness, Lightheadedness, Blurry vision, Headache, Chest pain, Shortness of breath      Diagnosis: Type 2 diabetes mellitus  Assessment and Plan of Treatment: HbA1c 9.4%  Continue Lantus to 47 units qHS and admelog 10 units TID qAC, hold if not eating  Follow up with PMD in 1 week  Make sure you get your HgA1c checked every three months.  If you take insulin, check your blood glucose before meals and at bedtime.  It's important not to skip any meals.  Keep a log of your blood glucose results and always take it with you to your doctor appointments.  Keep a list of your current medications including injectables and over the counter medications and bring this medication list with you to all your doctor appointments.  If you have not seen your ophthalmologist this year call for appointment.  Check your feet daily for redness, sores, or openings. Do not self treat. If no improvement in two days call your primary care physician for an appointment.  Low blood sugar (hypoglycemia) is a blood sugar below 70mg/dl. Check your blood sugar if you feel signs/symptoms of hypoglycemia. If your blood sugar is below 70 take 15 grams of carbohydrates (ex 4 oz of apple juice, 3-4 glucose tablets, or 4-6 oz of regular soda) wait 15 minutes and repeat blood sugar to make sure it comes up above 70.  If your blood sugar is above 70 and you are due for a meal, have a meal.  If you are not due for a meal have a snack.  This snack helps keeps your blood sugar at a safe range.      Diagnosis: Pneumonia  Assessment and Plan of Treatment: - completed 5 day course of ceftriaxone on 11/12  - CXR with bilateral opacities R > L, favor pulm edema but unable to exclude infectious etiology.  Pneumonia is a lung infection that can cause a fever, cough, and trouble breathing.  Continue all antibiotics as ordered until complete.  Nutrition is important, eat small frequent meals.  Get lots of rest and drink fluids.  Call your health care provider upon arrival home from hospital and make a follow up appointment for one week.  If your cough worsens, you develop fever greater than 101', you have shaking chills, a fast heartbeat, trouble breathing and/or feel your are breathing much faster than usual, call your healthcare provider.  Make sure you wash your hands frequently.       PRINCIPAL DISCHARGE DIAGNOSIS  Diagnosis: NSTEMI (non-ST elevation myocardial infarction)  Assessment and Plan of Treatment: NSTEMI at [facility], trops 8700->5000, s/p Heparin gtt 11/9/22  - trop improved to 872 here  - TTE with LVEF 57%, eccentric LVH with dilated LV, RV not well visualized.  - s/p hep gtt x 48 hours  - Continue ASA + Plavix  - Continue Atorvastatin 80mg qHS  - Given increase in creatinine, will have to hold off cardiac cath for now.   - Follow up as outpatient - will need renal optimization for cardiac cath as outpt  - Follow up with PMD in 1 week (next Tues or Wednesday for blood work to follow up kidney function)  - Follow up with Cardiologist - Dr. Nicole in 1 week and schedule for cardiac cath with [facility] interventional cardiology team - [phone]         SECONDARY DISCHARGE DIAGNOSES  Diagnosis: Acute systolic congestive heart failure  Assessment and Plan of Treatment: Problem/Plan - 2:  ·  Problem: Acute systolic congestive heart failure.  TTE at OSH 11/9/22 showed LVEF 40-45%, LV systolic function was moderately decreased.   - s/p Bumex IV   - more euvolemic on exam   - hold bumex  given RADHA on CKD  - Continue carvedilol to 25mg BID  - Increased hydralazine to 100mg TID  - Follow up with Cardiologist - Dr. Nicole in 1 week  Weigh yourself daily.  If you gain 3lbs in 3 days, or 5lbs in a week call your Health Care Provider.  Do not eat or drink foods containing more than 2000mg of salt (sodium) in your diet every day.  Call your Health Care Provider if you have any swelling or increased swelling in your feet, ankles, and/or stomach.  Take all of your medication as directed.  If you become dizzy call your Health Care Provider.      Diagnosis: RADHA (acute kidney injury)  Assessment and Plan of Treatment: Your creatinine on day of discharge was 2.05  Water pill is on hold  -Follow up with PMD in 1 week (next Tues or Wednesday for blood work to follow up kidney function)  Avoid taking (NSAIDs) - (ex: Ibuprofen, Advil, Celebrex, Naprosyn)  Avoid taking any nephrotoxic agents (can harm kidneys) - Intravenous contrast for diagnostic testing, combination cold medications.  Have all medications adjusted for your renal function by your Health Care Provider.  Blood pressure control is important.  Take all medication as prescribed.      Diagnosis: Hypertension  Assessment and Plan of Treatment: - Continue amlodipine 10mg daily   - HOLD LOSARTAN due to renal failure  - Continue carvedilol to 25 mg q12h  - Increase hydralazine to 100mg TID  -  Follow up with PMD in 1 week for b/p check  Take medications for your blood pressure as recommended.  Eat a heart healthy diet that is low in saturated fats and salt, and includes whole grains, fruits, vegetables and lean protein   Exercise regularly (consult with your physician or cardiologist first); maintain a heart healthy weight.   If you smoke - quit (A resource to help you stop smoking is the [facility] [employer] – phone number [phone].). Continue to follow with your primary physician or cardiologist.   Seek medical help for dizziness, Lightheadedness, Blurry vision, Headache, Chest pain, Shortness of breath  Follow up with your medical doctor to establish long term blood pressure treatment goals.      Diagnosis: Hyperlipidemia  Assessment and Plan of Treatment: Continue Atorvastatin  Continue with your cholesterol medications. Eat a heart healthy diet that is low in saturated fats and salt, and includes whole grains, fruits, vegetables and lean protein; exercise regularly (consult with your physician or cardiologist first); maintain a heart healthy weight; if you smoke - quit (A resource to help you stop smoking is the [facility] [employer] – phone number [phone].). Continue to follow with your primary physician or cardiologist.  Seek medical help for dizziness, Lightheadedness, Blurry vision, Headache, Chest pain, Shortness of breath      Diagnosis: Type 2 diabetes mellitus  Assessment and Plan of Treatment: HbA1c 9.4%  Continue Lantus to 47 units qHS and admelog 10 units TID qAC, hold if not eating  Follow up with PMD in 1 week  Make sure you get your HgA1c checked every three months.  If you take insulin, check your blood glucose before meals and at bedtime.  It's important not to skip any meals.  Keep a log of your blood glucose results and always take it with you to your doctor appointments.  Keep a list of your current medications including injectables and over the counter medications and bring this medication list with you to all your doctor appointments.  If you have not seen your ophthalmologist this year call for appointment.  Check your feet daily for redness, sores, or openings. Do not self treat. If no improvement in two days call your primary care physician for an appointment.  Low blood sugar (hypoglycemia) is a blood sugar below 70mg/dl. Check your blood sugar if you feel signs/symptoms of hypoglycemia. If your blood sugar is below 70 take 15 grams of carbohydrates (ex 4 oz of apple juice, 3-4 glucose tablets, or 4-6 oz of regular soda) wait 15 minutes and repeat blood sugar to make sure it comes up above 70.  If your blood sugar is above 70 and you are due for a meal, have a meal.  If you are not due for a meal have a snack.  This snack helps keeps your blood sugar at a safe range.      Diagnosis: Pneumonia  Assessment and Plan of Treatment: - completed 5 day course of ceftriaxone on 11/12  - CXR with bilateral opacities R > L, favor pulm edema but unable to exclude infectious etiology.  Pneumonia is a lung infection that can cause a fever, cough, and trouble breathing.  Continue all antibiotics as ordered until complete.  Nutrition is important, eat small frequent meals.  Get lots of rest and drink fluids.  Call your health care provider upon arrival home from hospital and make a follow up appointment for one week.  If your cough worsens, you develop fever greater than 101', you have shaking chills, a fast heartbeat, trouble breathing and/or feel your are breathing much faster than usual, call your healthcare provider.  Make sure you wash your hands frequently.

## 2022-11-17 NOTE — PROGRESS NOTE ADULT - NSPROGADDITIONALINFOA_GEN_ALL_CORE
.  Kamila Shahid MD  Division of Hospital Medicine  [facility]   Available on Microsoft Teams - messages preferred prior to calls.    Plan discussed with patient, wife Selma via phone on 11/14, and medicine STACEY Kelly.
.  Kamila Shahid MD  Division of Hospital Medicine  [facility]   Available on Microsoft Teams - messages preferred prior to calls.    Plan discussed with patient, wife Selma via phone, Cards fellow Bebeto, and medicine NP Beti.
.  Kamila Shahid MD  Division of Hospital Medicine  [facility]   Available on Microsoft Teams - messages preferred prior to calls.    Plan discussed with patient, wife Selma via phone, and medicine NP Beti.
.  Kamila Shahid MD  Division of Hospital Medicine  [facility]   Available on Microsoft Teams - messages preferred prior to calls.    Cardiac Cath deferred to outpatient in 1-2 weeks once renal function improves.  Patient to be discharged home today with outpatient f/u with PCP next Tues/Wed with repeat BMP check to monitor Cr, his Cardiologist Dr. Berto Nicole within 1 week, and to call [phone] to schedule outpatient elective cath in 1-2 weeks once renal function improves.  Discharge planning time spent: 45 minutes.    Plan discussed with patient, wife Selma via phone, Cards fellow Bebeto, and medicine NP Beti.

## 2022-11-17 NOTE — PROGRESS NOTE ADULT - PROBLEM SELECTOR PLAN 8
DVT ppx: hep subc pending final [facility] plans
DVT ppx: resume hep subc after [facility]
DVT ppx: resume hep subc after [facility] but patient has been ambulating around unit as well
DVT ppx: ambulate

## 2022-11-17 NOTE — PROGRESS NOTE ADULT - PROBLEM SELECTOR PLAN 4
uncontrolled hypertension.  - c/w amlodipine 10mg daily   - home losartan 50mg daily on hold given RADHA  - c/w carvedilol to 25 mg q12h  - c/w hydralazine to 100mg TID  - continue to monitor BP

## 2022-11-17 NOTE — PROGRESS NOTE ADULT - ASSESSMENT
63 yo M with PMH of T2DM, HTN, CAD. Transfer from [facility] for NSTEMI and management of acute decompensated HF. Pt has been undergoing diuresis with improvement of symptoms but still has noticeable leg edema up to knees.     REC:  1. NSTEMI  - patient had troponin 8700->5000 at OSH, started on ASA/Plavix/Heparin gtt since 11/9/2022 per chart  - continue ASA 81mg daily  - continue Plavix 75mg daily  - continue atorvastatin 80mg daily  - continue carvedilol 25mg bid  - Cr 2.15 -> 2.06, reassess for cath    2. Acute on chronic heart failure. 11/14 TTE EF 48%.  - continue to hold bumex in setting of increased creatinine  - continue hydralazine 100mg tid  - continue carvedilol 25mg bid  - Strict I/Os and daily standing weights  - replete electrolytes K>4, Mg>2    3. HTN  - continue amlodipine 10mg  - continue hydralazine 100mg tid    Haroldo Haddad, MS3 61 yo M with PMH of T2DM, HTN, CAD. Transfer from [facility] for NSTEMI and management of acute decompensated HF. Pt has been undergoing diuresis with improvement of symptoms but still has noticeable leg edema up to knees.     REC:  1. NSTEMI  - patient had troponin 8700->5000 at OSH, started on ASA/Plavix/Heparin gtt since 11/9/2022 per chart  - continue ASA 81mg daily  - continue Plavix 75mg daily  - continue atorvastatin 80mg daily  - continue carvedilol 25mg bid  - given increase in creatinine, will have to hold off cardiac cath for now.     2. Acute on chronic heart failure. 11/14 TTE EF 48%.  - continue to hold bumex in setting of increased creatinine  - continue hydralazine 100mg tid  - continue carvedilol 25mg bid  - Strict I/Os and daily standing weights  - replete electrolytes K>4, Mg>2    3. HTN  - continue amlodipine 10mg  - continue hydralazine 100mg tid    Haroldo Haddad, MS3

## 2022-11-17 NOTE — PROGRESS NOTE ADULT - PROVIDER SPECIALTY LIST ADULT
Cardiology
Hospitalist
Cardiology
Hospitalist
Internal Medicine
Internal Medicine
Hospitalist
Internal Medicine
Hospitalist

## 2022-11-19 NOTE — CHART NOTE - NSCHARTNOTESELECT_GEN_ALL_CORE
Post RRT/Event Note
Troponin/Event Note
d/c appt/Event Note
d/c appointment/Event Note
d/c appt/Event Note

## 2022-12-09 ENCOUNTER — INPATIENT (INPATIENT)
Facility: HOSPITAL | Age: 62
LOS: 13 days | Discharge: HOME HEALTH | DRG: 280 | End: 2022-12-23
Attending: HOSPITALIST | Admitting: STUDENT IN AN ORGANIZED HEALTH CARE EDUCATION/TRAINING PROGRAM
Payer: MEDICAID

## 2022-12-09 VITALS
OXYGEN SATURATION: 94 % | HEIGHT: 74 IN | TEMPERATURE: 97 F | RESPIRATION RATE: 24 BRPM | SYSTOLIC BLOOD PRESSURE: 189 MMHG | DIASTOLIC BLOOD PRESSURE: 76 MMHG | HEART RATE: 64 BPM | WEIGHT: 300.05 LBS

## 2022-12-09 DIAGNOSIS — Z89.421 ACQUIRED ABSENCE OF OTHER RIGHT TOE(S): Chronic | ICD-10-CM

## 2022-12-09 DIAGNOSIS — R60.9 EDEMA, UNSPECIFIED: ICD-10-CM

## 2022-12-09 DIAGNOSIS — Z90.49 ACQUIRED ABSENCE OF OTHER SPECIFIED PARTS OF DIGESTIVE TRACT: Chronic | ICD-10-CM

## 2022-12-09 LAB
ALBUMIN SERPL ELPH-MCNC: 3.8 G/DL — SIGNIFICANT CHANGE UP (ref 3.3–5)
ALP SERPL-CCNC: 129 U/L — HIGH (ref 40–120)
ALT FLD-CCNC: 50 U/L — HIGH (ref 10–45)
ANION GAP SERPL CALC-SCNC: 12 MMOL/L — SIGNIFICANT CHANGE UP (ref 5–17)
APPEARANCE UR: CLEAR — SIGNIFICANT CHANGE UP
AST SERPL-CCNC: 39 U/L — SIGNIFICANT CHANGE UP (ref 10–40)
BACTERIA # UR AUTO: NEGATIVE — SIGNIFICANT CHANGE UP
BASE EXCESS BLDV CALC-SCNC: -5.9 MMOL/L — LOW (ref -2–3)
BASOPHILS # BLD AUTO: 0.06 K/UL — SIGNIFICANT CHANGE UP (ref 0–0.2)
BASOPHILS NFR BLD AUTO: 0.5 % — SIGNIFICANT CHANGE UP (ref 0–2)
BILIRUB SERPL-MCNC: 0.5 MG/DL — SIGNIFICANT CHANGE UP (ref 0.2–1.2)
BILIRUB UR-MCNC: NEGATIVE — SIGNIFICANT CHANGE UP
BUN SERPL-MCNC: 62 MG/DL — HIGH (ref 7–23)
CA-I SERPL-SCNC: 1.18 MMOL/L — SIGNIFICANT CHANGE UP (ref 1.15–1.33)
CALCIUM SERPL-MCNC: 8.5 MG/DL — SIGNIFICANT CHANGE UP (ref 8.4–10.5)
CHLORIDE BLDV-SCNC: 105 MMOL/L — SIGNIFICANT CHANGE UP (ref 96–108)
CHLORIDE SERPL-SCNC: 105 MMOL/L — SIGNIFICANT CHANGE UP (ref 96–108)
CK MB BLD-MCNC: 1.5 % — SIGNIFICANT CHANGE UP (ref 0–3.5)
CK MB CFR SERPL CALC: 9.8 NG/ML — HIGH (ref 0–6.7)
CK SERPL-CCNC: 662 U/L — HIGH (ref 30–200)
CO2 BLDV-SCNC: 21 MMOL/L — LOW (ref 22–26)
CO2 SERPL-SCNC: 19 MMOL/L — LOW (ref 22–31)
COLOR SPEC: SIGNIFICANT CHANGE UP
CREAT SERPL-MCNC: 1.8 MG/DL — HIGH (ref 0.5–1.3)
DIFF PNL FLD: NEGATIVE — SIGNIFICANT CHANGE UP
EGFR: 42 ML/MIN/1.73M2 — LOW
EOSINOPHIL # BLD AUTO: 0.27 K/UL — SIGNIFICANT CHANGE UP (ref 0–0.5)
EOSINOPHIL NFR BLD AUTO: 2.2 % — SIGNIFICANT CHANGE UP (ref 0–6)
EPI CELLS # UR: 1 /HPF — SIGNIFICANT CHANGE UP
FLUAV AG NPH QL: SIGNIFICANT CHANGE UP
FLUBV AG NPH QL: SIGNIFICANT CHANGE UP
GAS PNL BLDV: 135 MMOL/L — LOW (ref 136–145)
GAS PNL BLDV: SIGNIFICANT CHANGE UP
GLUCOSE BLDC GLUCOMTR-MCNC: 222 MG/DL — HIGH (ref 70–99)
GLUCOSE BLDV-MCNC: 198 MG/DL — HIGH (ref 70–99)
GLUCOSE SERPL-MCNC: 188 MG/DL — HIGH (ref 70–99)
GLUCOSE UR QL: NEGATIVE — SIGNIFICANT CHANGE UP
HCO3 BLDV-SCNC: 20 MMOL/L — LOW (ref 22–29)
HCT VFR BLD CALC: 33.3 % — LOW (ref 39–50)
HCT VFR BLDA CALC: 32 % — LOW (ref 39–51)
HGB BLD CALC-MCNC: 10.8 G/DL — LOW (ref 12.6–17.4)
HGB BLD-MCNC: 10.5 G/DL — LOW (ref 13–17)
HYALINE CASTS # UR AUTO: 3 /LPF — SIGNIFICANT CHANGE UP (ref 0–7)
IMM GRANULOCYTES NFR BLD AUTO: 0.7 % — SIGNIFICANT CHANGE UP (ref 0–0.9)
KETONES UR-MCNC: NEGATIVE — SIGNIFICANT CHANGE UP
LACTATE BLDV-MCNC: 0.7 MMOL/L — SIGNIFICANT CHANGE UP (ref 0.5–2)
LEUKOCYTE ESTERASE UR-ACNC: NEGATIVE — SIGNIFICANT CHANGE UP
LYMPHOCYTES # BLD AUTO: 0.82 K/UL — LOW (ref 1–3.3)
LYMPHOCYTES # BLD AUTO: 6.7 % — LOW (ref 13–44)
MAGNESIUM SERPL-MCNC: 2.9 MG/DL — HIGH (ref 1.6–2.6)
MCHC RBC-ENTMCNC: 29.5 PG — SIGNIFICANT CHANGE UP (ref 27–34)
MCHC RBC-ENTMCNC: 31.5 GM/DL — LOW (ref 32–36)
MCV RBC AUTO: 93.5 FL — SIGNIFICANT CHANGE UP (ref 80–100)
MONOCYTES # BLD AUTO: 0.64 K/UL — SIGNIFICANT CHANGE UP (ref 0–0.9)
MONOCYTES NFR BLD AUTO: 5.3 % — SIGNIFICANT CHANGE UP (ref 2–14)
NEUTROPHILS # BLD AUTO: 10.28 K/UL — HIGH (ref 1.8–7.4)
NEUTROPHILS NFR BLD AUTO: 84.6 % — HIGH (ref 43–77)
NITRITE UR-MCNC: NEGATIVE — SIGNIFICANT CHANGE UP
NRBC # BLD: 0 /100 WBCS — SIGNIFICANT CHANGE UP (ref 0–0)
NT-PROBNP SERPL-SCNC: 1816 PG/ML — HIGH (ref 0–300)
PCO2 BLDV: 37 MMHG — LOW (ref 42–55)
PH BLDV: 7.33 — SIGNIFICANT CHANGE UP (ref 7.32–7.43)
PH UR: 5.5 — SIGNIFICANT CHANGE UP (ref 5–8)
PLATELET # BLD AUTO: 231 K/UL — SIGNIFICANT CHANGE UP (ref 150–400)
PO2 BLDV: 51 MMHG — HIGH (ref 25–45)
POTASSIUM BLDV-SCNC: 4.5 MMOL/L — SIGNIFICANT CHANGE UP (ref 3.5–5.1)
POTASSIUM SERPL-MCNC: 4.5 MMOL/L — SIGNIFICANT CHANGE UP (ref 3.5–5.3)
POTASSIUM SERPL-SCNC: 4.5 MMOL/L — SIGNIFICANT CHANGE UP (ref 3.5–5.3)
PROT SERPL-MCNC: 7.2 G/DL — SIGNIFICANT CHANGE UP (ref 6–8.3)
PROT UR-MCNC: ABNORMAL
RBC # BLD: 3.56 M/UL — LOW (ref 4.2–5.8)
RBC # FLD: 13.2 % — SIGNIFICANT CHANGE UP (ref 10.3–14.5)
RBC CASTS # UR COMP ASSIST: 3 /HPF — SIGNIFICANT CHANGE UP (ref 0–4)
RSV RNA NPH QL NAA+NON-PROBE: SIGNIFICANT CHANGE UP
SAO2 % BLDV: 85.4 % — SIGNIFICANT CHANGE UP (ref 67–88)
SARS-COV-2 RNA SPEC QL NAA+PROBE: DETECTED
SODIUM SERPL-SCNC: 136 MMOL/L — SIGNIFICANT CHANGE UP (ref 135–145)
SP GR SPEC: 1.02 — SIGNIFICANT CHANGE UP (ref 1.01–1.02)
TROPONIN T, HIGH SENSITIVITY RESULT: 57 NG/L — HIGH (ref 0–51)
TROPONIN T, HIGH SENSITIVITY RESULT: 65 NG/L — HIGH (ref 0–51)
UROBILINOGEN FLD QL: NEGATIVE — SIGNIFICANT CHANGE UP
WBC # BLD: 12.15 K/UL — HIGH (ref 3.8–10.5)
WBC # FLD AUTO: 12.15 K/UL — HIGH (ref 3.8–10.5)
WBC UR QL: 2 /HPF — SIGNIFICANT CHANGE UP (ref 0–5)

## 2022-12-09 PROCEDURE — 71045 X-RAY EXAM CHEST 1 VIEW: CPT | Mod: 26

## 2022-12-09 PROCEDURE — 99284 EMERGENCY DEPT VISIT MOD MDM: CPT

## 2022-12-09 PROCEDURE — 99223 1ST HOSP IP/OBS HIGH 75: CPT | Mod: GC

## 2022-12-09 NOTE — H&P ADULT - PROBLEM SELECTOR PLAN 7
Hospital Bundle  Fluids: fluid restricted 1L  Electrolytes: Replete K > 4, Mg > 2, Phos > 3  Nutrition: Diet CC DASH   PPX  ---VTE: heparin subq  ---GI: po diet  ---Resp: n/a  Access: PIV  Code Status: FULL CODE  Dispo: pending PT eval - c/w high intensity statin, aspirin

## 2022-12-09 NOTE — ED PROVIDER NOTE - CARE PLAN
1 Principal Discharge DX:	Pitting edema  Secondary Diagnosis:	Dyspnea on exertion   Principal Discharge DX:	Acute exacerbation of CHF (congestive heart failure)  Secondary Diagnosis:	Dyspnea on exertion  Secondary Diagnosis:	COVID-19 virus infection

## 2022-12-09 NOTE — H&P ADULT - ATTENDING COMMENTS
62M former smoker w/ HFpEF (TTE 11/14/22 EF 57%), HTN, HLD, T2DM on insulin, recent admission for NSTEMI and CHF exacerbation c/b RADHA, p/w worsening CORCORAN, LE edema, scrotal swelling, and cough for the past few weeks. Patient was discharged after recent admission in November off diuretics due to RADHA, was restarted on torsemide 20mg BID approximately 1 week ago. Diuretics have not really helped the swelling. Has also been taking levofloxacin, reportedly for cellulitis of the lower extremities. Admitted to medicine for acute on chronic diastolic heart failure exacerbation, also found to be COVID positive.    #acute on chronic diastolic heart failure exacerbation  - pt appears hypervolemic on exam, BNP 1800, trop 65->57  - CXR demonstrates interstitial opacities c/w pulmonary edema  - Last echo 11/14/22 EF 57%  - c/w diuresis with bumex 2mg IV BID  - repeat TTE  - monitor on tele  - measure I/Os, daily standing weight  - Replete lytes to maintain K>4, Mg>2  - cardiology consult in the AM    #COVID-19  - not hypoxic, on 2L NC for comfort  - no indication for decadron  - hold off on remdesevir i/s/o RADHA  - symptomatic/supportive treatment    #RADHA on CKD  - Cr 1.8 on admission, baseline ~1.6, was 2.0 on discharge from prior admission  - monitor BMP daily  - monitor urine output  - renally dose medications    #bilateral lower extremity redness  - pt was reportedly on levaquin for the past 7 days for LE cellulitis  - unlikely cellulitis, more likely severe venous stasis and edema. leukocytosis more likely related to COVID  - monitor off abx for now  - diuresis as above  - wound care consulted  - LE dopplers ordered    #HTN  - poorly controlled  - c/w home amlodipine, coreg, and hydralazine  - diuresis as above    #DM2  - c/w basal/bolus insulin based on prior hospital requirements    Agree with rest of plan as per resident note.  Discussed w/ resident Dr. Leticia Angelo MD, Hospitalist  Department of Internal Medicine  Pager: [phone]  Email: [email or web address] 62M former smoker w/ CHF (TTE 11/14/22 EF 57%), HTN, HLD, T2DM on insulin, recent admission for NSTEMI and CHF exacerbation c/b RADHA, p/w worsening CORCORAN, LE edema, scrotal swelling, and cough for the past few weeks. Patient was discharged after recent admission in November off diuretics due to RADHA, was restarted on torsemide 20mg BID approximately 1 week ago. Diuretics have not really helped the swelling. Has also been taking levofloxacin, reportedly for cellulitis of the lower extremities. Admitted to medicine for acute on chronic diastolic heart failure exacerbation, also found to be COVID positive.    #acute on chronic systolic/diastolic heart failure exacerbation  - pt appears hypervolemic on exam, BNP 1800, trop 65->57  - CXR demonstrates interstitial opacities c/w pulmonary edema  - Last echo 11/14/22 EF 57% w/ preserved systolic function. However TTE at OSH 11/9/22 showed LVEF 40-45%, LV systolic function was moderately decreased.   - c/w diuresis with bumex 2mg IV BID  - repeat TTE  - monitor on tele  - measure I/Os, daily standing weight  - Replete lytes to maintain K>4, Mg>2  - cardiology consult in the AM    #COVID-19  - not hypoxic, on 2L NC for comfort  - no indication for decadron  - hold off on remdesevir i/s/o RADHA  - symptomatic/supportive treatment    #RADHA on CKD  - Cr 1.8 on admission, baseline ~1.2, was 2.0 on discharge from prior admission  - possibly cardiorenal i/s/o CHF exacerbation vs new baseline  - monitor BMP daily  - monitor urine output  - renally dose medications    #bilateral lower extremity redness  - pt was reportedly on levaquin for the past 7 days for LE cellulitis  - unlikely cellulitis, more likely severe venous stasis and edema. leukocytosis more likely related to COVID  - monitor off abx for now  - diuresis as above  - wound care consulted  - LE dopplers ordered    #HTN  - poorly controlled  - c/w home amlodipine, coreg, and hydralazine  - diuresis as above    #DM2  - c/w basal/bolus insulin based on prior hospital requirements    Agree with rest of plan as per resident note.  Discussed w/ resident Dr. Leticia Angelo MD, Hospitalist  Department of Internal Medicine  Pager: [phone]  Email: [email or web address]

## 2022-12-09 NOTE — H&P ADULT - PROBLEM SELECTOR PLAN 5
- c/w high intensity statin, aspirin - f/u AM a1c  - basal bolus: Lantus 45U QHS, Lispro 10U tid AC, low ISS ACHS per prior admission Continuing basal bolus insulin per prior admission levels.  - f/u AM a1c  - basal bolus: Lantus 45U QHS, Lispro 10U tid AC, low ISS ACHS per prior admission

## 2022-12-09 NOTE — H&P ADULT - PROBLEM SELECTOR PLAN 6
- f/u AM a1c  - basal bolus: Lantus 45U QHS, Lispro 10U tid AC, low ISS ACHS per prior admission - c/w bumex  - c/w home amlodipine  - c/w coreg 25 bid - c/w bumex  - c/w home amlodipine, coreg, hydralazine

## 2022-12-09 NOTE — ED PROVIDER NOTE - OBJECTIVE STATEMENT
63 yo male with PMH of HTN, HLD, T2DM with recent admission for  NSTEMI and CHF exacerbation requiring IV diuresis with course c/b RADHA on CKD now here with worsening LE edema and CORCORAN. On DC with was supposed to get renal optimization for cardiac cath but didn't f/u after DC on 11/17/22. Was taken off bumex due to RADHA with baseline reportedly Cr 1.2. For last wk unable to walk even a block due to CORCORAN and inc LE swelling, scrotal swelling. No CP, but cannot lie flat. No abd pain, NVDC. No SOB at rest. Cards: Dr. Nicole / PCP Dr Jacobsen 61 yo male with PMH of HTN, HLD, T2DM with recent admission for NSTEMI and CHF exacerbation requiring IV diuresis with course c/b RADHA on CKD now here with worsening LE edema and CORCORAN. On DC with was supposed to get renal optimization for future cardiac cath but didn't f/u after DC on 11/17/22. Was taken off bumex during hospitalization due to RADHA with previous baseline reportedly Cr 1.2. Not on diuretics since DC. For last wk unable to walk even a block due to CORCORAN and inc LE swelling, scrotal swelling. No CP, but cannot lie flat. No abd pain, NVDC. No SOB at rest. Cards: Dr. Nicole / PCP Dr Jacobsen

## 2022-12-09 NOTE — H&P ADULT - NSHPPHYSICALEXAM_GEN_ALL_CORE
PHYSICAL EXAM:  GENERAL: NAD, well-groomed, well-developed  HEAD:  Atraumatic, Normocephalic  EYES: EOMI, PERRLA, conjunctiva and sclera clear  ENMT: No tonsillar erythema, exudates, or enlargement; Moist mucous membranes  NECK: Supple, No JVD, Normal thyroid  HEART: Regular rate and rhythm; No murmurs, rubs, or gallops  RESPIRATORY: CTA B/L, No W/R/R  ABDOMEN: Soft, Nontender, Nondistended; Bowel sounds present  NEUROLOGY: A&Ox3, nonfocal, moving all extremities  EXTREMITIES:  2+ Peripheral Pulses, No clubbing, cyanosis, or edema  SKIN: warm, dry, normal color, no rash or abnormal lesions PHYSICAL EXAM:  GENERAL: NAD, Obese  HEAD:  Atraumatic, Normocephalic  EYES: EOMI, PERRLA, conjunctiva and sclera clear  ENMT: No tonsillar erythema, exudates, or enlargement; Moist mucous membranes  NECK: Supple, No JVD, Normal thyroid  HEART: Regular rate and rhythm; No murmurs, rubs, or gallops  RESPIRATORY: +fine crackles b/l, equal breath sounds auscultated b/l. No wheezing.  ABDOMEN: +Distended +anasarca; Soft, Nontender; Bowel sounds present  NEUROLOGY: A&Ox3, nonfocal, moving all extremities  EXTREMITIES:  2+ Peripheral Pulses, No clubbing, cyanosis, or edema  SKIN: +3 pitting edema to abdomen, +erythema on b/l leg, +weeping b/l leg

## 2022-12-09 NOTE — H&P ADULT - PROBLEM SELECTOR PLAN 8
Hospital Bundle  Fluids: fluid restricted 1L  Electrolytes: Replete K > 4, Mg > 2, Phos > 3  Nutrition: Diet CC DASH   PPX  ---VTE: heparin subq  ---GI: po diet  ---Resp: n/a  Access: PIV  Code Status: FULL CODE  Dispo: pending PT eval

## 2022-12-09 NOTE — H&P ADULT - NSHPOUTPATIENTPROVIDERS_GEN_ALL_CORE
PCP - Nain Jacobsen [phone]  Cardiology - Gurpreet Ocampo PCP - Nain Jacobsen [phone]  Cardiology - Gurpreet Ocampo  Cardiology - Shaik Berto

## 2022-12-09 NOTE — ED ADULT NURSE NOTE - OBJECTIVE STATEMENT
63 y/o M PMHx HTN, DM, CAD c/c B/L lower extremity swelling, groin swelling, SOB with exertion, difficulty with ambulation w/ SOB. Reported that he saw his PCP who prescribed him antibiotics x5 days ago, course not finished. Reported midsternal CP 1/0 when laying flat. Denies n/v/d/dizznes/f. VSS, Safety precautions maintained.

## 2022-12-09 NOTE — H&P ADULT - NSHPREVIEWOFSYSTEMS_GEN_ALL_CORE
REVIEW OF SYSTEMS:    CONSTITUTIONAL: No weakness, fevers or chills  EYES/ENT: No visual changes;  No vertigo or throat pain   NECK: No pain or stiffness  RESPIRATORY: No cough, wheezing, hemoptysis; No shortness of breath  CARDIOVASCULAR: No chest pain or palpitations  GASTROINTESTINAL: No abdominal or epigastric pain. No nausea, vomiting, or hematemesis; No diarrhea or constipation. No melena or hematochezia.  GENITOURINARY: No dysuria, frequency or hematuria  NEUROLOGICAL: No numbness or weakness  SKIN: No itching, rashes  MUSCULOSKELETAL: No joint pain, muscle pain. REVIEW OF SYSTEMS:    CONSTITUTIONAL: + weakness. No fevers or chills  EYES/ENT: No visual changes;  No vertigo or throat pain   NECK: No pain or stiffness  RESPIRATORY: + cough, wheezing, SOB, No hemoptysis  CARDIOVASCULAR: No chest pain or palpitations  GASTROINTESTINAL: + distension, swelling. No abdominal or epigastric pain. No nausea, vomiting, or hematemesis; No diarrhea or constipation. No melena or hematochezia.  GENITOURINARY: +scrotal swelling, sensitivity. No dysuria, frequency or hematuria  NEUROLOGICAL: No numbness or weakness  SKIN: +b/l LE edema, weeping, red. No pain.  MUSCULOSKELETAL: No joint pain, muscle pain.

## 2022-12-09 NOTE — H&P ADULT - NSHPLABSRESULTS_GEN_ALL_CORE
LABORATORY DATA                        10.5   12.15 )-----------( 231      ( 09 Dec 2022 17:49 )             33.3         136  |  105  |  62<H>  ----------------------------<  188<H>  4.5   |  19<L>  |  1.80<H>    Ca    8.5      09 Dec 2022 17:49  Mg     2.9         TPro  7.2  /  Alb  3.8  /  TBili  0.5  /  DBili  x   /  AST  39  /  ALT  50<H>  /  AlkPhos  129<H>        CARDIAC MARKERS ( 09 Dec 2022 19:44 )  x     / x     / 662 U/L / x     / 9.8 ng/mL      Urinalysis Basic - ( 09 Dec 2022 20:35 )    Color: Light Yellow / Appearance: Clear / S.017 / pH: x  Gluc: x / Ketone: Negative  / Bili: Negative / Urobili: Negative   Blood: x / Protein: 100 mg/dL / Nitrite: Negative   Leuk Esterase: Negative / RBC: 3 /hpf / WBC 2 /HPF   Sq Epi: x / Non Sq Epi: 1 /hpf / Bacteria: Negative        pO2, Venous: 51 mmHg (22 @ 17:35)  pH, Venous: 7.33 (22 @ 17:35)  HCO3, Venous: 20 mmol/L (22 @ 17:35)  pCO2, Venous: 37 mmHg (22 @ 17:35)    Blood Gas Venous - Lactate: 0.7 mmol/L (22 @ 17:35)      CAPILLARY BLOOD GLUCOSE          IMAGING REVIEW      MICROBIOLOGY REVIEW      CARDIOLOGY REVIEW LABORATORY DATA                        10.5   12.15 )-----------( 231      ( 09 Dec 2022 17:49 )             33.3         136  |  105  |  62<H>  ----------------------------<  188<H>  4.5   |  19<L>  |  1.80<H>    Ca    8.5      09 Dec 2022 17:49  Mg     2.9         TPro  7.2  /  Alb  3.8  /  TBili  0.5  /  DBili  x   /  AST  39  /  ALT  50<H>  /  AlkPhos  129<H>      CARDIAC MARKERS ( 09 Dec 2022 19:44 )  x     / x     / 662 U/L / x     / 9.8 ng/mL    Urinalysis Basic - ( 09 Dec 2022 20:35 )    Color: Light Yellow / Appearance: Clear / S.017 / pH: x  Gluc: x / Ketone: Negative  / Bili: Negative / Urobili: Negative   Blood: x / Protein: 100 mg/dL / Nitrite: Negative   Leuk Esterase: Negative / RBC: 3 /hpf / WBC 2 /HPF   Sq Epi: x / Non Sq Epi: 1 /hpf / Bacteria: Negative    pO2, Venous: 51 mmHg (22 @ 17:35)  pH, Venous: 7.33 (22 @ 17:35)  HCO3, Venous: 20 mmol/L (22 @ 17:35)  pCO2, Venous: 37 mmHg (22 @ 17:35)    Blood Gas Venous - Lactate: 0.7 mmol/L (22 @ 17:35)    CAPILLARY BLOOD GLUCOSE    IMAGING REVIEW    MICROBIOLOGY REVIEW    CARDIOLOGY REVIEW

## 2022-12-09 NOTE — H&P ADULT - PROBLEM SELECTOR PLAN 3
h/o CKD, now with BUN/SCr 62/1.80, eGFR 42.  - trend SCr  - cautious diuresis with IV bumex as tolerated  - avoid further nephrotoxic medications

## 2022-12-09 NOTE — ED PROVIDER NOTE - RAPID ASSESSMENT
62y M w/ PMHx of HTN, CAD, DMT2 presents to the ED c/o SOB, B/L LE swelling, groin swelling. Pt also c/o difficulty ambulating due to swelling Pt was recently restarted on diuretic. Pt was previously in this ED 2 wks ago for cardiac workup. Cardiologist Dr. Ocampo. Pt is awake, alert and in no distress.    Devante BATISTA (Scribe) have documented this rapid assessment note under the dictation of Jose E Schrader) which has been reviewed and affirmed to be accurate. Patient was seen as a QDOC patient. 62y M w/ PMHx of HTN, CAD, DMT2 presents to the ED c/o SOB, B/L LE swelling, groin swelling. Pt also c/o difficulty ambulating due to swelling Pt was recently restarted on diuretic. Pt was previously in this ED 2 wks ago for cardiac workup. Cardiologist Dr. Ocampo. Pt is awake, alert and in no distress.    Devante BATISTA (Scribe) have documented this rapid assessment note under the dictation of Jose E Schrader (MD) which has been reviewed and affirmed to be accurate. Patient was seen as a QDOC patient.    Pt seen as Triage/Q-doc full evaluation to be performed once patient is transferred to main ED  Jose E Schrader MD, Facep

## 2022-12-09 NOTE — ED PROVIDER NOTE - CLINICAL SUMMARY MEDICAL DECISION MAKING FREE TEXT BOX
Johan PGY2: 63 yo male with PMH of HTN, HLD, T2DM with recent admission for NSTEMI and CHF exacerbation requiring IV diuresis with course c/b RADHA on CKD now here with worsening LE edema and CORCORAN likely CHF exacerbation due to being off diuretics. Will plan to check labs, trop, EKG and plan for admission for cards & diuresis. 63 yo male with PMH of HTN, HLD, T2DM with recent admission for NSTEMI and CHF exacerbation requiring IV diuresis with course c/b RADHA on CKD now here with worsening LE edema and CORCORAN likely acute CHF exacerbation in setting of being off diuretics. Less likley ACS w/o CP, no CORCORAN. May also be overloaded d/t worsening RADHA/renal function though is urinating. Will plan to check labs, trop, EKG and plan for admission for card eval & diuresis.

## 2022-12-09 NOTE — ED PROVIDER NOTE - PHYSICAL EXAMINATION
CONSTITUTIONAL: NAD  SKIN: Warm dry  HEAD: NCAT  EYES: NL inspection  ENT: MMM  NECK: Supple; non tender. short thick neck unable to appreciate JVD  CARD: RRR  RESP: poor lung exam due to body habitus, crackles in bases  ABD: distended, S/NT no R/G  : exam with RN chaperone - swollen scrotum appreciated w/o tenderness   EXT: 3+ edema to knees with some erythema but no warmth or signs or purulence   NEURO: Grossly unremarkable  PSYCH: Cooperative, appropriate.

## 2022-12-09 NOTE — H&P ADULT - PROBLEM SELECTOR PLAN 1
NSTEMI last month w/ LHC deferred d/t poor renal function. Now p/w ADHF.   - start diuresis w/ IV bumex 2 bid, goal - 1 to 2L per day  - strict I/O, 1L fluid restriction  - f/u TTE  - f/u doppler b/l LE  - eventual nuclear stress test  - eventual [facility]  - cardiology consult, f/u recs NSTEMI last month w/ LHC deferred d/t poor renal function. Now p/w ADHF.   - start diuresis w/ IV bumex 2 bid, goal - 1 to 2L per day  - strict I/O, 1L fluid restriction, daily weights  - f/u TTE  - eventual nuclear stress test, LHC  - cardiology consult, f/u recs NSTEMI last month w/ LHC deferred d/t poor renal function. Now p/w ADHF. Currently following w/ Dr. Shaik Thomson  - start diuresis w/ IV bumex 2 bid, goal - 1 to 2L per day  - strict I/O, 1L fluid restriction, daily weights  - f/u TTE  - eventual nuclear stress test, LHC  - cardiology consult in AM (previously seen by house)

## 2022-12-09 NOTE — H&P ADULT - PROBLEM SELECTOR PLAN 4
- c/w bumex  - c/w home amlodipine  - hold coreg iso ADHF s/p 7 days of levaquin  - f/u doppler b/l LE s/p 7 days of levaquin. now w/ leukocytosis, not febrile.  - f/u doppler b/l LE  - wound care consulted; pending recs  - trend fever curve, WBCs  - consider Unasyn if febrile/worsening

## 2022-12-09 NOTE — H&P ADULT - ASSESSMENT
62M former smoker w/ HFpEF (TTE 11/14/22 EF 57%), h/o NSTEMI, HTN, HLD, T2DM on insulin, p/w worsening SOB, leg/scrotal edema, c/f ADHF, found covid-19 positive.

## 2022-12-10 DIAGNOSIS — Z29.9 ENCOUNTER FOR PROPHYLACTIC MEASURES, UNSPECIFIED: ICD-10-CM

## 2022-12-10 DIAGNOSIS — Z90.49 ACQUIRED ABSENCE OF OTHER SPECIFIED PARTS OF DIGESTIVE TRACT: Chronic | ICD-10-CM

## 2022-12-10 DIAGNOSIS — L03.90 CELLULITIS, UNSPECIFIED: ICD-10-CM

## 2022-12-10 DIAGNOSIS — I10 ESSENTIAL (PRIMARY) HYPERTENSION: ICD-10-CM

## 2022-12-10 DIAGNOSIS — U07.1 COVID-19: ICD-10-CM

## 2022-12-10 DIAGNOSIS — N18.32 CHRONIC KIDNEY DISEASE, STAGE 3B: ICD-10-CM

## 2022-12-10 DIAGNOSIS — E11.9 TYPE 2 DIABETES MELLITUS WITHOUT COMPLICATIONS: ICD-10-CM

## 2022-12-10 DIAGNOSIS — I50.33 ACUTE ON CHRONIC DIASTOLIC (CONGESTIVE) HEART FAILURE: ICD-10-CM

## 2022-12-10 DIAGNOSIS — Z89.421 ACQUIRED ABSENCE OF OTHER RIGHT TOE(S): Chronic | ICD-10-CM

## 2022-12-10 DIAGNOSIS — I87.2 VENOUS INSUFFICIENCY (CHRONIC) (PERIPHERAL): ICD-10-CM

## 2022-12-10 DIAGNOSIS — E78.5 HYPERLIPIDEMIA, UNSPECIFIED: ICD-10-CM

## 2022-12-10 LAB
A1C WITH ESTIMATED AVERAGE GLUCOSE RESULT: 8.7 % — HIGH (ref 4–5.6)
ALBUMIN SERPL ELPH-MCNC: 3.5 G/DL — SIGNIFICANT CHANGE UP (ref 3.3–5)
ALP SERPL-CCNC: 115 U/L — SIGNIFICANT CHANGE UP (ref 40–120)
ALT FLD-CCNC: 44 U/L — SIGNIFICANT CHANGE UP (ref 10–45)
ANION GAP SERPL CALC-SCNC: 11 MMOL/L — SIGNIFICANT CHANGE UP (ref 5–17)
APTT BLD: 30.8 SEC — SIGNIFICANT CHANGE UP (ref 27.5–35.5)
AST SERPL-CCNC: 35 U/L — SIGNIFICANT CHANGE UP (ref 10–40)
BILIRUB SERPL-MCNC: 0.6 MG/DL — SIGNIFICANT CHANGE UP (ref 0.2–1.2)
BUN SERPL-MCNC: 55 MG/DL — HIGH (ref 7–23)
CALCIUM SERPL-MCNC: 8.6 MG/DL — SIGNIFICANT CHANGE UP (ref 8.4–10.5)
CHLORIDE SERPL-SCNC: 106 MMOL/L — SIGNIFICANT CHANGE UP (ref 96–108)
CHOLEST SERPL-MCNC: 67 MG/DL — SIGNIFICANT CHANGE UP
CO2 SERPL-SCNC: 19 MMOL/L — LOW (ref 22–31)
CREAT SERPL-MCNC: 1.68 MG/DL — HIGH (ref 0.5–1.3)
EGFR: 45 ML/MIN/1.73M2 — LOW
ESTIMATED AVERAGE GLUCOSE: 203 MG/DL — HIGH (ref 68–114)
GLUCOSE BLDC GLUCOMTR-MCNC: 130 MG/DL — HIGH (ref 70–99)
GLUCOSE BLDC GLUCOMTR-MCNC: 178 MG/DL — HIGH (ref 70–99)
GLUCOSE BLDC GLUCOMTR-MCNC: 179 MG/DL — HIGH (ref 70–99)
GLUCOSE BLDC GLUCOMTR-MCNC: 208 MG/DL — HIGH (ref 70–99)
GLUCOSE BLDC GLUCOMTR-MCNC: 209 MG/DL — HIGH (ref 70–99)
GLUCOSE SERPL-MCNC: 177 MG/DL — HIGH (ref 70–99)
HCT VFR BLD CALC: 30.9 % — LOW (ref 39–50)
HCV AB S/CO SERPL IA: 0.18 S/CO — SIGNIFICANT CHANGE UP (ref 0–0.99)
HCV AB SERPL-IMP: SIGNIFICANT CHANGE UP
HDLC SERPL-MCNC: 26 MG/DL — LOW
HGB BLD-MCNC: 9.8 G/DL — LOW (ref 13–17)
INR BLD: 1.4 RATIO — HIGH (ref 0.88–1.16)
LIPID PNL WITH DIRECT LDL SERPL: 28 MG/DL — SIGNIFICANT CHANGE UP
MAGNESIUM SERPL-MCNC: 3 MG/DL — HIGH (ref 1.6–2.6)
MCHC RBC-ENTMCNC: 29.3 PG — SIGNIFICANT CHANGE UP (ref 27–34)
MCHC RBC-ENTMCNC: 31.7 GM/DL — LOW (ref 32–36)
MCV RBC AUTO: 92.2 FL — SIGNIFICANT CHANGE UP (ref 80–100)
NON HDL CHOLESTEROL: 41 MG/DL — SIGNIFICANT CHANGE UP
NRBC # BLD: 0 /100 WBCS — SIGNIFICANT CHANGE UP (ref 0–0)
PHOSPHATE SERPL-MCNC: 3.7 MG/DL — SIGNIFICANT CHANGE UP (ref 2.5–4.5)
PLATELET # BLD AUTO: 233 K/UL — SIGNIFICANT CHANGE UP (ref 150–400)
POTASSIUM SERPL-MCNC: 4.5 MMOL/L — SIGNIFICANT CHANGE UP (ref 3.5–5.3)
POTASSIUM SERPL-SCNC: 4.5 MMOL/L — SIGNIFICANT CHANGE UP (ref 3.5–5.3)
PROT SERPL-MCNC: 7 G/DL — SIGNIFICANT CHANGE UP (ref 6–8.3)
PROTHROM AB SERPL-ACNC: 16.2 SEC — HIGH (ref 10.5–13.4)
RBC # BLD: 3.35 M/UL — LOW (ref 4.2–5.8)
RBC # FLD: 13.4 % — SIGNIFICANT CHANGE UP (ref 10.3–14.5)
SODIUM SERPL-SCNC: 136 MMOL/L — SIGNIFICANT CHANGE UP (ref 135–145)
TRIGL SERPL-MCNC: 63 MG/DL — SIGNIFICANT CHANGE UP
WBC # BLD: 9.68 K/UL — SIGNIFICANT CHANGE UP (ref 3.8–10.5)
WBC # FLD AUTO: 9.68 K/UL — SIGNIFICANT CHANGE UP (ref 3.8–10.5)

## 2022-12-10 PROCEDURE — 99222 1ST HOSP IP/OBS MODERATE 55: CPT

## 2022-12-10 PROCEDURE — 99233 SBSQ HOSP IP/OBS HIGH 50: CPT

## 2022-12-10 RX ORDER — AMLODIPINE BESYLATE 2.5 MG/1
5 TABLET ORAL DAILY
Refills: 0 | Status: DISCONTINUED | OUTPATIENT
Start: 2022-12-10 | End: 2022-12-10

## 2022-12-10 RX ORDER — DEXTROSE 50 % IN WATER 50 %
25 SYRINGE (ML) INTRAVENOUS ONCE
Refills: 0 | Status: DISCONTINUED | OUTPATIENT
Start: 2022-12-10 | End: 2022-12-23

## 2022-12-10 RX ORDER — HYDRALAZINE HCL 50 MG
100 TABLET ORAL EVERY 8 HOURS
Refills: 0 | Status: DISCONTINUED | OUTPATIENT
Start: 2022-12-10 | End: 2022-12-23

## 2022-12-10 RX ORDER — HEPARIN SODIUM 5000 [USP'U]/ML
7500 INJECTION INTRAVENOUS; SUBCUTANEOUS EVERY 8 HOURS
Refills: 0 | Status: DISCONTINUED | OUTPATIENT
Start: 2022-12-10 | End: 2022-12-10

## 2022-12-10 RX ORDER — AMLODIPINE BESYLATE 2.5 MG/1
10 TABLET ORAL DAILY
Refills: 0 | Status: DISCONTINUED | OUTPATIENT
Start: 2022-12-10 | End: 2022-12-10

## 2022-12-10 RX ORDER — BUMETANIDE 0.25 MG/ML
2 INJECTION INTRAMUSCULAR; INTRAVENOUS EVERY 12 HOURS
Refills: 0 | Status: DISCONTINUED | OUTPATIENT
Start: 2022-12-10 | End: 2022-12-22

## 2022-12-10 RX ORDER — IPRATROPIUM/ALBUTEROL SULFATE 18-103MCG
3 AEROSOL WITH ADAPTER (GRAM) INHALATION EVERY 6 HOURS
Refills: 0 | Status: DISCONTINUED | OUTPATIENT
Start: 2022-12-10 | End: 2022-12-23

## 2022-12-10 RX ORDER — BUMETANIDE 0.25 MG/ML
2 INJECTION INTRAMUSCULAR; INTRAVENOUS EVERY 12 HOURS
Refills: 0 | Status: DISCONTINUED | OUTPATIENT
Start: 2022-12-10 | End: 2022-12-10

## 2022-12-10 RX ORDER — CLOPIDOGREL BISULFATE 75 MG/1
75 TABLET, FILM COATED ORAL DAILY
Refills: 0 | Status: DISCONTINUED | OUTPATIENT
Start: 2022-12-10 | End: 2022-12-23

## 2022-12-10 RX ORDER — ASPIRIN/CALCIUM CARB/MAGNESIUM 324 MG
81 TABLET ORAL DAILY
Refills: 0 | Status: DISCONTINUED | OUTPATIENT
Start: 2022-12-10 | End: 2022-12-23

## 2022-12-10 RX ORDER — CARVEDILOL PHOSPHATE 80 MG/1
25 CAPSULE, EXTENDED RELEASE ORAL EVERY 12 HOURS
Refills: 0 | Status: DISCONTINUED | OUTPATIENT
Start: 2022-12-10 | End: 2022-12-10

## 2022-12-10 RX ORDER — ATORVASTATIN CALCIUM 80 MG/1
80 TABLET, FILM COATED ORAL AT BEDTIME
Refills: 0 | Status: DISCONTINUED | OUTPATIENT
Start: 2022-12-10 | End: 2022-12-23

## 2022-12-10 RX ORDER — INSULIN LISPRO 100/ML
VIAL (ML) SUBCUTANEOUS AT BEDTIME
Refills: 0 | Status: DISCONTINUED | OUTPATIENT
Start: 2022-12-10 | End: 2022-12-23

## 2022-12-10 RX ORDER — HEPARIN SODIUM 5000 [USP'U]/ML
5000 INJECTION INTRAVENOUS; SUBCUTANEOUS EVERY 8 HOURS
Refills: 0 | Status: DISCONTINUED | OUTPATIENT
Start: 2022-12-10 | End: 2022-12-23

## 2022-12-10 RX ORDER — LANOLIN ALCOHOL/MO/W.PET/CERES
3 CREAM (GRAM) TOPICAL AT BEDTIME
Refills: 0 | Status: DISCONTINUED | OUTPATIENT
Start: 2022-12-10 | End: 2022-12-23

## 2022-12-10 RX ORDER — ACETAMINOPHEN 500 MG
650 TABLET ORAL EVERY 6 HOURS
Refills: 0 | Status: DISCONTINUED | OUTPATIENT
Start: 2022-12-10 | End: 2022-12-23

## 2022-12-10 RX ORDER — ONDANSETRON 8 MG/1
4 TABLET, FILM COATED ORAL EVERY 8 HOURS
Refills: 0 | Status: DISCONTINUED | OUTPATIENT
Start: 2022-12-10 | End: 2022-12-23

## 2022-12-10 RX ORDER — CARVEDILOL PHOSPHATE 80 MG/1
25 CAPSULE, EXTENDED RELEASE ORAL EVERY 12 HOURS
Refills: 0 | Status: DISCONTINUED | OUTPATIENT
Start: 2022-12-10 | End: 2022-12-23

## 2022-12-10 RX ORDER — SPIRONOLACTONE 25 MG/1
50 TABLET, FILM COATED ORAL DAILY
Refills: 0 | Status: DISCONTINUED | OUTPATIENT
Start: 2022-12-10 | End: 2022-12-23

## 2022-12-10 RX ORDER — NIFEDIPINE 30 MG
60 TABLET, EXTENDED RELEASE 24 HR ORAL EVERY 24 HOURS
Refills: 0 | Status: DISCONTINUED | OUTPATIENT
Start: 2022-12-11 | End: 2022-12-23

## 2022-12-10 RX ORDER — INSULIN GLARGINE 100 [IU]/ML
45 INJECTION, SOLUTION SUBCUTANEOUS AT BEDTIME
Refills: 0 | Status: DISCONTINUED | OUTPATIENT
Start: 2022-12-10 | End: 2022-12-19

## 2022-12-10 RX ORDER — BUMETANIDE 0.25 MG/ML
2 INJECTION INTRAMUSCULAR; INTRAVENOUS
Refills: 0 | Status: DISCONTINUED | OUTPATIENT
Start: 2022-12-10 | End: 2022-12-10

## 2022-12-10 RX ORDER — SODIUM CHLORIDE 9 MG/ML
1000 INJECTION, SOLUTION INTRAVENOUS
Refills: 0 | Status: DISCONTINUED | OUTPATIENT
Start: 2022-12-10 | End: 2022-12-23

## 2022-12-10 RX ORDER — INSULIN LISPRO 100/ML
10 VIAL (ML) SUBCUTANEOUS
Refills: 0 | Status: DISCONTINUED | OUTPATIENT
Start: 2022-12-10 | End: 2022-12-23

## 2022-12-10 RX ORDER — GLUCAGON INJECTION, SOLUTION 0.5 MG/.1ML
1 INJECTION, SOLUTION SUBCUTANEOUS ONCE
Refills: 0 | Status: DISCONTINUED | OUTPATIENT
Start: 2022-12-10 | End: 2022-12-23

## 2022-12-10 RX ORDER — DEXTROSE 50 % IN WATER 50 %
15 SYRINGE (ML) INTRAVENOUS ONCE
Refills: 0 | Status: DISCONTINUED | OUTPATIENT
Start: 2022-12-10 | End: 2022-12-23

## 2022-12-10 RX ORDER — INSULIN LISPRO 100/ML
VIAL (ML) SUBCUTANEOUS
Refills: 0 | Status: DISCONTINUED | OUTPATIENT
Start: 2022-12-10 | End: 2022-12-23

## 2022-12-10 RX ORDER — DEXTROSE 50 % IN WATER 50 %
12.5 SYRINGE (ML) INTRAVENOUS ONCE
Refills: 0 | Status: DISCONTINUED | OUTPATIENT
Start: 2022-12-10 | End: 2022-12-23

## 2022-12-10 RX ADMIN — Medication 650 MILLIGRAM(S): at 22:16

## 2022-12-10 RX ADMIN — HEPARIN SODIUM 5000 UNIT(S): 5000 INJECTION INTRAVENOUS; SUBCUTANEOUS at 05:12

## 2022-12-10 RX ADMIN — CLOPIDOGREL BISULFATE 75 MILLIGRAM(S): 75 TABLET, FILM COATED ORAL at 12:19

## 2022-12-10 RX ADMIN — HEPARIN SODIUM 5000 UNIT(S): 5000 INJECTION INTRAVENOUS; SUBCUTANEOUS at 22:17

## 2022-12-10 RX ADMIN — INSULIN GLARGINE 45 UNIT(S): 100 INJECTION, SOLUTION SUBCUTANEOUS at 22:17

## 2022-12-10 RX ADMIN — Medication 1: at 08:50

## 2022-12-10 RX ADMIN — Medication 650 MILLIGRAM(S): at 23:10

## 2022-12-10 RX ADMIN — CARVEDILOL PHOSPHATE 25 MILLIGRAM(S): 80 CAPSULE, EXTENDED RELEASE ORAL at 17:07

## 2022-12-10 RX ADMIN — HEPARIN SODIUM 5000 UNIT(S): 5000 INJECTION INTRAVENOUS; SUBCUTANEOUS at 12:20

## 2022-12-10 RX ADMIN — AMLODIPINE BESYLATE 10 MILLIGRAM(S): 2.5 TABLET ORAL at 05:19

## 2022-12-10 RX ADMIN — Medication 100 MILLIGRAM(S): at 05:12

## 2022-12-10 RX ADMIN — Medication 10 UNIT(S): at 12:19

## 2022-12-10 RX ADMIN — Medication 10 UNIT(S): at 08:50

## 2022-12-10 RX ADMIN — Medication 81 MILLIGRAM(S): at 12:19

## 2022-12-10 RX ADMIN — CARVEDILOL PHOSPHATE 25 MILLIGRAM(S): 80 CAPSULE, EXTENDED RELEASE ORAL at 05:12

## 2022-12-10 RX ADMIN — ATORVASTATIN CALCIUM 80 MILLIGRAM(S): 80 TABLET, FILM COATED ORAL at 22:17

## 2022-12-10 RX ADMIN — Medication 650 MILLIGRAM(S): at 18:49

## 2022-12-10 RX ADMIN — BUMETANIDE 2 MILLIGRAM(S): 0.25 INJECTION INTRAMUSCULAR; INTRAVENOUS at 17:06

## 2022-12-10 RX ADMIN — Medication 10 UNIT(S): at 17:13

## 2022-12-10 RX ADMIN — Medication 1 APPLICATION(S): at 12:18

## 2022-12-10 RX ADMIN — BUMETANIDE 2 MILLIGRAM(S): 0.25 INJECTION INTRAMUSCULAR; INTRAVENOUS at 02:12

## 2022-12-10 RX ADMIN — Medication 1: at 12:19

## 2022-12-10 RX ADMIN — Medication 100 MILLIGRAM(S): at 12:32

## 2022-12-10 RX ADMIN — Medication 1 APPLICATION(S): at 17:13

## 2022-12-10 RX ADMIN — Medication 100 MILLIGRAM(S): at 22:17

## 2022-12-10 RX ADMIN — SPIRONOLACTONE 50 MILLIGRAM(S): 25 TABLET, FILM COATED ORAL at 12:19

## 2022-12-10 RX ADMIN — Medication 650 MILLIGRAM(S): at 16:43

## 2022-12-10 NOTE — PROGRESS NOTE ADULT - ATTENDING COMMENTS
62M former smoker w/ CHF (TTE 11/14/22 EF 57%), HTN, HLD, T2DM on insulin, recent admission for NSTEMI and CHF exacerbation c/b RADHA, p/w worsening CORCORAN, LE edema, scrotal swelling, and cough for the past few weeks. Patient was discharged after recent admission in November off diuretics due to RADHA, was restarted on torsemide 20mg BID approximately 1 week ago. Diuretics have not really helped the swelling. Has also been taking levofloxacin, reportedly for cellulitis of the lower extremities. Admitted to medicine for acute on chronic diastolic heart failure exacerbation, also found to be COVID positive. Evaluated by Cardiology, recommends continued diuresis with Bumex 2 IV BID and spironolactone, pending TTE to evaluate cardiac function. Strict I&Os, daily weights, monitoring on tele. Supportive measures for COVID, currently only requiring 2L O2. RADHA likely in setting of HF/cardiorenal vs COVID, continue to trend BMP, diuresis. B/l LE likely with chronic venous stasis as opposed to cellulitis, Wound Care consult pending, Duplex LE pending. C/w antihypertensive agents for HTN, insulin regimen for DM.

## 2022-12-10 NOTE — PROGRESS NOTE ADULT - PROBLEM SELECTOR PLAN 4
s/p 7 days of levaquin. now w/ leukocytosis, not febrile.  - f/u doppler b/l LE  - wound care consulted; pending recs  - trend fever curve, WBCs  - consider Unasyn if febrile/worsening previously treated with 7 days of levaquin for assumed b/l cellulitis. now w/ leukocytosis, not febrile.  - f/u doppler b/l LE  - wound care consulted; pending recs  - tx with topical steroid (TAC ointment)   - trend fever curve, WBCs  - consider Unasyn if febrile/worsening previously treated with 7 days of levaquin for assumed b/l cellulitis. now w/ leukocytosis, not febrile.  - f/u doppler b/l LE  - wound care consulted; pending recs  - tx with topical steroid (TAC ointment)  - trend fever curve, WBCs  - consider Unasyn if febrile/worsening

## 2022-12-10 NOTE — PROGRESS NOTE ADULT - PROBLEM SELECTOR PLAN 1
NSTEMI last month w/ LHC deferred d/t poor renal function. Now p/w ADHF. Currently following w/ Dr. Shaik Thomson  - start diuresis w/ IV bumex 2 bid, goal - 1 to 2L per day  - strict I/O, 1L fluid restriction, daily weights  - f/u TTE  - eventual nuclear stress test, LHC  - cards consulted, f/u recs

## 2022-12-10 NOTE — PROGRESS NOTE ADULT - SUBJECTIVE AND OBJECTIVE BOX
Candida Castro MD  Internal Medicine, PGY-1        Patient Name: MARY ROBIN  Patient MRN: [id number]    Patient is a 62y old  Male who presents with a chief complaint of HF exacerbation (09 Dec 2022 22:00)      **SUBJECTIVE**    Last 24 hours: Admitted overnight. No acute change in symptoms. Denies CP, SOB, HA, lightheadedness, n/v/c/d.     Review of Systems  Constitutional: +weakness; No fevers, or chills  HEENT: No headache, visual changes, lightheadedness, or sore throat  Respiratory: + dry cough, SOB; no hemoptysis  Cardiovascular: No chest pain or palpitations  GI: No abdominal or epigastric pain. No nausea, vomiting, or change in bowel habits. No hematemesis or hematochezia  Neuro: No numbness or weakness  Skin: + b/l LE edema. significant scrotal swelling. No itching or rashes      **OBJECTIVE**        VITALS:   T(C): 36.7 (12-10-22 @ 05:05), Max: 36.7 (12-10-22 @ 05:05)  HR: 69 (12-10-22 @ 05:05) (64 - 72)  BP: 189/81 (12-10-22 @ 05:05) (178/67 - 189/81)  RR: 20 (12-10-22 @ 05:05) (18 - 24)  SpO2: 93% (12-10-22 @ 05:05) (92% - 98%)    GENERAL: NAD, Obese  HEAD:  Atraumatic, Normocephalic  EYES: EOMI, PERRLA, conjunctiva and sclera clear  ENMT: No tonsillar erythema, exudates, or enlargement; Moist mucous membranes  NECK: Supple, No JVD, Normal thyroid  HEART: Regular rate and rhythm; No murmurs, rubs, or gallops  RESPIRATORY: +fine crackles b/l, equal breath sounds auscultated b/l. No wheezing.  ABDOMEN: +Distended +anasarca; Soft, Nontender; Bowel sounds present  NEUROLOGY: A&Ox3, nonfocal, moving all extremities  EXTREMITIES:  2+ Peripheral Pulses, No clubbing, cyanosis, or edema  SKIN: +3 pitting edema to abdomen, +erythema on b/l leg, +weeping b/l leg    Labs      136  |  105  |  62<H>  ----------------------------<  188<H>  4.5   |  19<L>  |  1.80<H>    Ca    8.5      09 Dec 2022 17:49  Mg     2.9         TPro  7.2  /  Alb  3.8  /  TBili  0.5  /  DBili  x   /  AST  39  /  ALT  50<H>  /  AlkPhos  129<H>      CBC Full  -  ( 09 Dec 2022 17:49 )  WBC Count : 12.15 K/uL  RBC Count : 3.56 M/uL  Hemoglobin : 10.5 g/dL  Hematocrit : 33.3 %  Platelet Count - Automated : 231 K/uL  Mean Cell Volume : 93.5 fl  Mean Cell Hemoglobin : 29.5 pg  Mean Cell Hemoglobin Concentration : 31.5 gm/dL  Auto Neutrophil # : 10.28 K/uL  Auto Lymphocyte # : 0.82 K/uL  Auto Monocyte # : 0.64 K/uL  Auto Eosinophil # : 0.27 K/uL  Auto Basophil # : 0.06 K/uL  Auto Neutrophil % : 84.6 %  Auto Lymphocyte % : 6.7 %  Auto Monocyte % : 5.3 %  Auto Eosinophil % : 2.2 %  Auto Basophil % : 0.5 %    CARDIAC MARKERS ( 09 Dec 2022 19:44 )  x     / x     / 662 U/L / x     / 9.8 ng/mL            POC Glucose  CAPILLARY BLOOD GLUCOSE      POCT Blood Glucose.: 222 mg/dL (09 Dec 2022 22:52)      I&O's  I&O's Summary    09 Dec 2022 07:01  -  10 Dec 2022 07:00  --------------------------------------------------------  IN: 0 mL / OUT: 1100 mL / NET: -1100 mL        UA (if applicable)  Urinalysis Basic - ( 09 Dec 2022 20:35 )    Color: Light Yellow / Appearance: Clear / S.017 / pH: x  Gluc: x / Ketone: Negative  / Bili: Negative / Urobili: Negative   Blood: x / Protein: 100 mg/dL / Nitrite: Negative   Leuk Esterase: Negative / RBC: 3 /hpf / WBC 2 /HPF   Sq Epi: x / Non Sq Epi: 1 /hpf / Bacteria: Negative        Micro (if applicable)      Imaging (if applicable)    Active Medications  MEDICATIONS  (STANDING):  amLODIPine   Tablet 10 milliGRAM(s) Oral daily  aspirin enteric coated 81 milliGRAM(s) Oral daily  atorvastatin 80 milliGRAM(s) Oral at bedtime  buMETAnide Injectable 2 milliGRAM(s) IV Push every 12 hours  carvedilol 25 milliGRAM(s) Oral every 12 hours  clopidogrel Tablet 75 milliGRAM(s) Oral daily  dextrose 5%. 1000 milliLiter(s) (100 mL/Hr) IV Continuous <Continuous>  dextrose 5%. 1000 milliLiter(s) (50 mL/Hr) IV Continuous <Continuous>  dextrose 50% Injectable 25 Gram(s) IV Push once  dextrose 50% Injectable 12.5 Gram(s) IV Push once  dextrose 50% Injectable 25 Gram(s) IV Push once  glucagon  Injectable 1 milliGRAM(s) IntraMuscular once  heparin   Injectable 5000 Unit(s) SubCutaneous every 8 hours  hydrALAZINE 100 milliGRAM(s) Oral every 8 hours  insulin glargine Injectable (LANTUS) 45 Unit(s) SubCutaneous at bedtime  insulin lispro (ADMELOG) corrective regimen sliding scale   SubCutaneous three times a day before meals  insulin lispro (ADMELOG) corrective regimen sliding scale   SubCutaneous at bedtime  insulin lispro Injectable (ADMELOG) 10 Unit(s) SubCutaneous three times a day before meals    MEDICATIONS  (PRN):  acetaminophen     Tablet .. 650 milliGRAM(s) Oral every 6 hours PRN Temp greater or equal to 38C (100.4F), Mild Pain (1 - 3)  albuterol/ipratropium for Nebulization 3 milliLiter(s) Nebulizer every 6 hours PRN Shortness of Breath and/or Wheezing  aluminum hydroxide/magnesium hydroxide/simethicone Suspension 30 milliLiter(s) Oral every 4 hours PRN Dyspepsia  dextrose Oral Gel 15 Gram(s) Oral once PRN Blood Glucose LESS THAN 70 milliGRAM(s)/deciliter  melatonin 3 milliGRAM(s) Oral at bedtime PRN Insomnia  ondansetron Injectable 4 milliGRAM(s) IV Push every 8 hours PRN Nausea and/or Vomiting           Candida Castro MD  Internal Medicine, PGY-1        Patient Name: MARY ROBIN  Patient MRN: [id number]    Patient is a 62y old  Male who presents with a chief complaint of HF exacerbation (09 Dec 2022 22:00)      **SUBJECTIVE**    Last 24 hours: Admitted overnight. No acute change in symptoms. Says he feels better since admission. Denies CP, SOB, HA, lightheadedness, n/v/c/d.     Review of Systems  Constitutional: +weakness; No fevers, or chills  HEENT: No headache, visual changes, lightheadedness, or sore throat  Respiratory: + dry cough, SOB; no hemoptysis  Cardiovascular: No chest pain or palpitations  GI: No abdominal or epigastric pain. No nausea, vomiting, or change in bowel habits. No hematemesis or hematochezia  Neuro: No numbness or weakness  Skin: + b/l LE edema. significant scrotal swelling. No itching or rashes      **OBJECTIVE**        VITALS:   T(C): 36.7 (12-10-22 @ 05:05), Max: 36.7 (12-10-22 @ 05:05)  HR: 69 (12-10-22 @ 05:05) (64 - 72)  BP: 189/81 (12-10-22 @ 05:05) (178/67 - 189/81)  RR: 20 (12-10-22 @ 05:05) (18 - 24)  SpO2: 93% (12-10-22 @ 05:05) (92% - 98%)    GENERAL: NAD, Obese  HEAD:  Atraumatic, Normocephalic  EYES: EOMI, PERRLA, conjunctiva and sclera clear  ENMT: No tonsillar erythema, exudates, or enlargement; Moist mucous membranes  NECK: Supple, No JVD, Normal thyroid  HEART: Regular rate and rhythm; No murmurs, rubs, or gallops  RESPIRATORY: +fine crackles b/l, equal breath sounds auscultated b/l. No wheezing.  ABDOMEN: +Distended +anasarca; Soft, Nontender; Bowel sounds present  NEUROLOGY: A&Ox3, nonfocal, moving all extremities  EXTREMITIES:  2+ Peripheral Pulses, No clubbing, cyanosis, or edema  SKIN: +3 pitting edema to abdomen, +erythema on b/l leg, +weeping b/l leg    Labs      136  |  105  |  62<H>  ----------------------------<  188<H>  4.5   |  19<L>  |  1.80<H>    Ca    8.5      09 Dec 2022 17:49  Mg     2.9         TPro  7.2  /  Alb  3.8  /  TBili  0.5  /  DBili  x   /  AST  39  /  ALT  50<H>  /  AlkPhos  129<H>      CBC Full  -  ( 09 Dec 2022 17:49 )  WBC Count : 12.15 K/uL  RBC Count : 3.56 M/uL  Hemoglobin : 10.5 g/dL  Hematocrit : 33.3 %  Platelet Count - Automated : 231 K/uL  Mean Cell Volume : 93.5 fl  Mean Cell Hemoglobin : 29.5 pg  Mean Cell Hemoglobin Concentration : 31.5 gm/dL  Auto Neutrophil # : 10.28 K/uL  Auto Lymphocyte # : 0.82 K/uL  Auto Monocyte # : 0.64 K/uL  Auto Eosinophil # : 0.27 K/uL  Auto Basophil # : 0.06 K/uL  Auto Neutrophil % : 84.6 %  Auto Lymphocyte % : 6.7 %  Auto Monocyte % : 5.3 %  Auto Eosinophil % : 2.2 %  Auto Basophil % : 0.5 %    CARDIAC MARKERS ( 09 Dec 2022 19:44 )  x     / x     / 662 U/L / x     / 9.8 ng/mL            POC Glucose  CAPILLARY BLOOD GLUCOSE      POCT Blood Glucose.: 222 mg/dL (09 Dec 2022 22:52)      I&O's  I&O's Summary    09 Dec 2022 07:01  -  10 Dec 2022 07:00  --------------------------------------------------------  IN: 0 mL / OUT: 1100 mL / NET: -1100 mL        UA (if applicable)  Urinalysis Basic - ( 09 Dec 2022 20:35 )    Color: Light Yellow / Appearance: Clear / S.017 / pH: x  Gluc: x / Ketone: Negative  / Bili: Negative / Urobili: Negative   Blood: x / Protein: 100 mg/dL / Nitrite: Negative   Leuk Esterase: Negative / RBC: 3 /hpf / WBC 2 /HPF   Sq Epi: x / Non Sq Epi: 1 /hpf / Bacteria: Negative        Micro (if applicable)      Imaging (if applicable)    Active Medications  MEDICATIONS  (STANDING):  amLODIPine   Tablet 10 milliGRAM(s) Oral daily  aspirin enteric coated 81 milliGRAM(s) Oral daily  atorvastatin 80 milliGRAM(s) Oral at bedtime  buMETAnide Injectable 2 milliGRAM(s) IV Push every 12 hours  carvedilol 25 milliGRAM(s) Oral every 12 hours  clopidogrel Tablet 75 milliGRAM(s) Oral daily  dextrose 5%. 1000 milliLiter(s) (100 mL/Hr) IV Continuous <Continuous>  dextrose 5%. 1000 milliLiter(s) (50 mL/Hr) IV Continuous <Continuous>  dextrose 50% Injectable 25 Gram(s) IV Push once  dextrose 50% Injectable 12.5 Gram(s) IV Push once  dextrose 50% Injectable 25 Gram(s) IV Push once  glucagon  Injectable 1 milliGRAM(s) IntraMuscular once  heparin   Injectable 5000 Unit(s) SubCutaneous every 8 hours  hydrALAZINE 100 milliGRAM(s) Oral every 8 hours  insulin glargine Injectable (LANTUS) 45 Unit(s) SubCutaneous at bedtime  insulin lispro (ADMELOG) corrective regimen sliding scale   SubCutaneous three times a day before meals  insulin lispro (ADMELOG) corrective regimen sliding scale   SubCutaneous at bedtime  insulin lispro Injectable (ADMELOG) 10 Unit(s) SubCutaneous three times a day before meals    MEDICATIONS  (PRN):  acetaminophen     Tablet .. 650 milliGRAM(s) Oral every 6 hours PRN Temp greater or equal to 38C (100.4F), Mild Pain (1 - 3)  albuterol/ipratropium for Nebulization 3 milliLiter(s) Nebulizer every 6 hours PRN Shortness of Breath and/or Wheezing  aluminum hydroxide/magnesium hydroxide/simethicone Suspension 30 milliLiter(s) Oral every 4 hours PRN Dyspepsia  dextrose Oral Gel 15 Gram(s) Oral once PRN Blood Glucose LESS THAN 70 milliGRAM(s)/deciliter  melatonin 3 milliGRAM(s) Oral at bedtime PRN Insomnia  ondansetron Injectable 4 milliGRAM(s) IV Push every 8 hours PRN Nausea and/or Vomiting

## 2022-12-10 NOTE — PHYSICAL THERAPY INITIAL EVALUATION ADULT - PERTINENT HX OF CURRENT PROBLEM, REHAB EVAL
Pt is 62M admitted 12/9/22 PMHx former smoker w/ HFpEF (TTE 11/14/22 EF 57%), h/o NSTEMI, HTN HLD T2DM on insulin, p/w worsening SOB, leg/scrotal edema, c/f ADHF. Patient was recently admitted 11/11-17/22 as transfer from [facility] for NSTEMI and CHF exacerbation with C deferred pending renal function improvement. Patient was discharged with outpatient follow up but noted that he was told to discontinue diuresis. Upon followup with outpatient cardiology for echo, leg doppler, and nuclear stress test, he was renewed on torsemide 20mg 2 tablets daily and given levofloxacin for his LE cellulitis. ECG during that visit revealed left anterior hemiblock old lateral wall MI and nonspecific ST-T wave abnormalities. Patient developed worsening cough and LE and scrotal edema over the past 2 weeks, causing him to be largely bedbound.     In the ED, pt was hypertensive 189/76, HR 64, tachypneic 24/min,  SpO2 94% on room air, afebrile. RVP showed Covid+. CXR done showed interstitial pulmonary edema.       XRAY CHEST: Increased interstitial lung opacities which likely represents interstitial pulmonary edema.

## 2022-12-10 NOTE — CONSULT NOTE ADULT - ATTENDING COMMENTS
62M w/ former smoker w/ HFpEF (TTE EF 57%, 11/14/22), HTN, HLD, IDDM, and recent NSTEMI, who presented with worsening LE edema and SOB, likely acute on chronic HF exacerbation in setting of not having any diuretics at home. He was found to be COVID positive.    He has JVD and 3+ pitting edema to bilateral hips. He also has bibasilar crackles.  Cr is 1.68, improving from discharge last admission (reportedly baseline 1.31)  Trop 65 to 57  BNP 1816  ECG with NSR and LVH    -significantly volume overloaded on exam, agree with IV bumex 2mg bid, aim for net neg at least 2L/24 hours. Consider starting gtt if pt has poor response  -continue aldactone daily  -once pt is euvolemic, Cr improves, and off isolation with COVID, will discuss plans for ischemic evaluation  -on ASA, plavix daily (from previous NSTEMI)  -continue statin daily

## 2022-12-10 NOTE — PHYSICAL THERAPY INITIAL EVALUATION ADULT - GENERAL OBSERVATIONS, REHAB EVAL
received semisupine in bed, A&OX4, following commands, pleasant & eager to participate, a/w HF exacerbation, +COVID, +supplemental 02 via NC, +tele. BS reviewed.

## 2022-12-10 NOTE — PATIENT PROFILE ADULT - FALL HARM RISK - HARM RISK INTERVENTIONS

## 2022-12-10 NOTE — CONSULT NOTE ADULT - SUBJECTIVE AND OBJECTIVE BOX
Patient seen and evaluated at bedside  Consult question:    HPI:  62M former smoker w/ HFpEF (TTE 22 EF 57%), h/o NSTEMI, HTN HLD T2DM on insulin, p/w worsening SOB, leg/scrotal edema, c/f ADHF. Patient was recently admitted - as transfer from [facility] for NSTEMI and CHF exacerbation with C deferred pending renal function improvement. Patient was discharged with outpatient follow up but noted that he was told to discontinue diuresis. Upon followup with outpatient cardiology for echo, leg doppler, and nuclear stress test, he was renewed on torsemide 20mg 2 tablets daily and given levofloxacin for his LE cellulitis. ECG during that visit revealed left anterior hemiblock old lateral wall MI and nonspecific ST-T wave abnormalities. Patient developed worsening cough and LE and scrotal edema over the past 2 weeks, causing him to be largely bedbound. Pt denied any fever, chills, n/v/c/d, dysuria, palpitations, chest pain, lightheadedness, LOC, sick contacts, recent travel. Of note, patient was reportedly previously on 100mg of torsemide intermittently in the past.   In the ED, pt was hypertensive 189/76, HR 64, tachypneic 24/min,  SpO2 94% on room air, afebrile. RVP showed Covid+. CXR done showed interstitial pulmonary edema.  (09 Dec 2022 22:00)    PMH:   HTN (hypertension)    CAD (coronary artery disease)    DM (diabetes mellitus)      PSH:   S/P amputation of lesser toe, right    History of cholecystectomy      Medications:   acetaminophen     Tablet .. 650 milliGRAM(s) Oral every 6 hours PRN  albuterol/ipratropium for Nebulization 3 milliLiter(s) Nebulizer every 6 hours PRN  aluminum hydroxide/magnesium hydroxide/simethicone Suspension 30 milliLiter(s) Oral every 4 hours PRN  aspirin enteric coated 81 milliGRAM(s) Oral daily  atorvastatin 80 milliGRAM(s) Oral at bedtime  buMETAnide Injectable 2 milliGRAM(s) IV Push every 12 hours  carvedilol 25 milliGRAM(s) Oral every 12 hours  clopidogrel Tablet 75 milliGRAM(s) Oral daily  dextrose 5%. 1000 milliLiter(s) IV Continuous <Continuous>  dextrose 5%. 1000 milliLiter(s) IV Continuous <Continuous>  dextrose 50% Injectable 25 Gram(s) IV Push once  dextrose 50% Injectable 12.5 Gram(s) IV Push once  dextrose 50% Injectable 25 Gram(s) IV Push once  dextrose Oral Gel 15 Gram(s) Oral once PRN  glucagon  Injectable 1 milliGRAM(s) IntraMuscular once  heparin   Injectable 5000 Unit(s) SubCutaneous every 8 hours  hydrALAZINE 100 milliGRAM(s) Oral every 8 hours  insulin glargine Injectable (LANTUS) 45 Unit(s) SubCutaneous at bedtime  insulin lispro (ADMELOG) corrective regimen sliding scale   SubCutaneous three times a day before meals  insulin lispro (ADMELOG) corrective regimen sliding scale   SubCutaneous at bedtime  insulin lispro Injectable (ADMELOG) 10 Unit(s) SubCutaneous three times a day before meals  melatonin 3 milliGRAM(s) Oral at bedtime PRN  ondansetron Injectable 4 milliGRAM(s) IV Push every 8 hours PRN  spironolactone 50 milliGRAM(s) Oral daily  triamcinolone 0.1% Cream 1 Application(s) Topical every 12 hours    Allergies:  No Known Allergies    FAMILY HISTORY:    Social History:  Smoking:  Alcohol:  Drugs:    Review of Systems:  Constitutional: [ ] Fever [ ] Chills [ ] Fatigue [ ] Weight change   HEENT: [ ] Blurred vision [ ] Eye Pain [ ] Headache [ ] Runny nose [ ] Sore Throat   Respiratory: [ ] Cough [ ] Wheezing [ ] Shortness of breath  Cardiovascular: [ ] Chest Pain [ ] Palpitations [ ] CORCORAN [ ] PND [ ] Orthopnea  Gastrointestinal: [ ] Abdominal Pain [ ] Diarrhea [ ] Constipation [ ] Hemorrhoids [ ] Nausea [ ] Vomiting  Genitourinary: [ ] Nocturia [ ] Dysuria [ ] Incontinence  Extremities: [ ] Swelling [ ] Joint Pain  Neurologic: [ ] Focal deficit [ ] Paresthesias [ ] Syncope  Lymphatic: [ ] Swelling [ ] Lymphadenopathy   Skin: [ ] Rash [ ] Ecchymoses [ ] Wounds [ ] Lesions  Psychiatry: [ ] Depression [ ] Suicidal/Homicidal Ideation [ ] Anxiety [ ] Sleep Disturbances  [ ] 10 point review of systems is otherwise negative except as mentioned above            [ ]Unable to obtain    Physical Exam:  T(C): 36.7 (12-10-22 @ 05:05), Max: 36.7 (12-10-22 @ 05:05)  HR: 64 (12-10-22 @ 12:17) (60 - 72)  BP: 145/59 (12-10-22 @ 12:17) (145/59 - 189/81)  RR: 20 (12-10-22 @ 12:17) (18 - 24)  SpO2: 94% (12-10-22 @ 12:17) (92% - 98%)  Wt(kg): --     @ 07:01  -  12-10 @ 07:00  --------------------------------------------------------  IN: 0 mL / OUT: 1100 mL / NET: -1100 mL    12-10 @ 07:01  -  12-10 @ 13:50  --------------------------------------------------------  IN: 225 mL / OUT: 0 mL / NET: 225 mL      Daily Height in cm: 187.96 (09 Dec 2022 16:09)    Daily Weight in k.2 (09 Dec 2022 22:35)    Appearance: NAD  Eyes: PERRL, EOMI  HENT: Normal oral muscosa, NC/AT  Cardiovascular: normal S1 and S2, RRR, no m/r/g, no edema, normal JVP  Procedural Access Site:  No hematoma, non-tender to palpation, 2+ pulses distally, no bruit, no ecchymosis  Respiratory: Clear to auscultation bilaterally  Gastrointestinal: Soft, non-tender, non-distended, BS+  Musculoskeletal: No clubbing, no joint deformity   Neurologic: Non-focal  Lymphatic: No lymphadenopathy  Psychiatry: AAOx3, mood & affect appropriate  Skin: No rashes, no ecchymoses, no cyanosis    Cardiovascular Diagnostic Testing:  ECG:    Echo:    Stress Testing:    Cath:    Interpretation of Telemetry:    Imaging:    Labs:                        9.8    9.68  )-----------( 233      ( 10 Dec 2022 06:54 )             30.9     12-10    136  |  106  |  55<H>  ----------------------------<  177<H>  4.5   |  19<L>  |  1.68<H>    Ca    8.6      10 Dec 2022 12:44  Phos  3.7     12-10  Mg     3.0     12-10    TPro  7.0  /  Alb  3.5  /  TBili  0.6  /  DBili  x   /  AST  35  /  ALT  44  /  AlkPhos  115  12-10    PT/INR - ( 10 Dec 2022 06:55 )   PT: 16.2 sec;   INR: 1.40 ratio         PTT - ( 10 Dec 2022 06:55 )  PTT:30.8 sec  CARDIAC MARKERS ( 09 Dec 2022 19:44 )  x     / x     / 662 U/L / x     / 9.8 ng/mL      Serum Pro-Brain Natriuretic Peptide: 1816 pg/mL ( @ 17:49)    Total Cholesterol: 67  LDL: --  HDL: 26  T       Patient seen and evaluated at bedside  Consult question:    HPI:  62 yr old  male with PMHx of DMT2 (Hgba1c 6.0, pt follows [facility] PMD), HTN, CAD presents with SOB difficulty breathing, diaphoresis and moderate chest pain that started on 22 presented to [facility].   Troponin were 8700>5000, pt started on Heparin gtt/ASA/Plavix.  TTE done on 22 showed LVEF 40-45%, LV systolic function was moderately decreased.   Pt was admitted for Acute systolic Heart failure in the setting of ACS. Chest Xray with significant pulmonary edema, bilateral airspace opacities-pt started on Ceftriaxone 1 gm q 24 hrs, Clindamycin 600 mg/50 every 8 hrs IVPB, Repeat Chest Xray from 11/10/22 showed decreased pulmonary edema.   Cr 2.2 on , this admission 2.2>2.0    Pt had a recent Stress Echo as outpatient abnormal and was scheduled for elective Cardiac Cath on Thursday.   Pt has a healing blister on left shin secondary to injury from shovel.   Bialteral LE +cellulitis with +3 edema.  (2022 11:00)    Upon evaluation, patient reports ongoing dyspnea on exertion, yet better than yesterday. He denies active CP, palpitation, lightheadedness, dizziness.  He reports dry weight 225-230lbs. He was off diuretics when he was discharged last time and was not on any diuretics, yet his legs and scrotum continued to get larger, and immobilized him. He reports having good urination with current diuretic regimen.      PMH:   HTN (hypertension)    CAD (coronary artery disease)    DM (diabetes mellitus)      PSH:   S/P amputation of lesser toe, right    History of cholecystectomy      Medications:   acetaminophen     Tablet .. 650 milliGRAM(s) Oral every 6 hours PRN  albuterol/ipratropium for Nebulization 3 milliLiter(s) Nebulizer every 6 hours PRN  aluminum hydroxide/magnesium hydroxide/simethicone Suspension 30 milliLiter(s) Oral every 4 hours PRN  aspirin enteric coated 81 milliGRAM(s) Oral daily  atorvastatin 80 milliGRAM(s) Oral at bedtime  buMETAnide Injectable 2 milliGRAM(s) IV Push every 12 hours  carvedilol 25 milliGRAM(s) Oral every 12 hours  clopidogrel Tablet 75 milliGRAM(s) Oral daily  dextrose 5%. 1000 milliLiter(s) IV Continuous <Continuous>  dextrose 5%. 1000 milliLiter(s) IV Continuous <Continuous>  dextrose 50% Injectable 25 Gram(s) IV Push once  dextrose 50% Injectable 12.5 Gram(s) IV Push once  dextrose 50% Injectable 25 Gram(s) IV Push once  dextrose Oral Gel 15 Gram(s) Oral once PRN  glucagon  Injectable 1 milliGRAM(s) IntraMuscular once  heparin   Injectable 5000 Unit(s) SubCutaneous every 8 hours  hydrALAZINE 100 milliGRAM(s) Oral every 8 hours  insulin glargine Injectable (LANTUS) 45 Unit(s) SubCutaneous at bedtime  insulin lispro (ADMELOG) corrective regimen sliding scale   SubCutaneous three times a day before meals  insulin lispro (ADMELOG) corrective regimen sliding scale   SubCutaneous at bedtime  insulin lispro Injectable (ADMELOG) 10 Unit(s) SubCutaneous three times a day before meals  melatonin 3 milliGRAM(s) Oral at bedtime PRN  ondansetron Injectable 4 milliGRAM(s) IV Push every 8 hours PRN  spironolactone 50 milliGRAM(s) Oral daily  triamcinolone 0.1% Cream 1 Application(s) Topical every 12 hours    Allergies:  No Known Allergies    FAMILY HISTORY:    Social History:  Smoking:  Alcohol:  Drugs:      Review of Systems:  Constitutional: [ ] Fever [ ] Chills [ ] Fatigue [ ] Weight change   HEENT: [ ] Blurred vision [ ] Eye Pain [ ] Headache [ ] Runny nose [ ] Sore Throat   Respiratory: [ ] Cough [ ] Wheezing [ ] Shortness of breath  Cardiovascular: [ ] Chest Pain [ ] Palpitations [ ] CORCORAN [ ] PND [ ] Orthopnea  Gastrointestinal: [ ] Abdominal Pain [ ] Diarrhea [ ] Constipation [ ] Hemorrhoids [ ] Nausea [ ] Vomiting  Genitourinary: [ ] Nocturia [ ] Dysuria [ ] Incontinence  Extremities: [x ] Swelling [ ] Joint Pain  Neurologic: [ ] Focal deficit [ ] Paresthesias [ ] Syncope  Lymphatic: [ ] Swelling [ ] Lymphadenopathy   Skin: [ ] Rash [ ] Ecchymoses [ ] Wounds [ ] Lesions  Psychiatry: [ ] Depression [ ] Suicidal/Homicidal Ideation [ ] Anxiety [ ] Sleep Disturbances  [ ] 10 point review of systems is otherwise negative except as mentioned above            [ ]Unable to obtain    Physical Exam:  T(C): 36.7 (12-10-22 @ 05:05), Max: 36.7 (12-10-22 @ 05:05)  HR: 64 (12-10-22 @ 12:17) (60 - 72)  BP: 145/59 (12-10-22 @ 12:17) (145/59 - 189/81)  RR: 20 (12-10-22 @ 12:17) (18 - 24)  SpO2: 94% (12-10-22 @ 12:17) (92% - 98%)  Wt(kg): --     @ 07:01  -  12-10 @ 07:00  --------------------------------------------------------  IN: 0 mL / OUT: 1100 mL / NET: -1100 mL    12-10 @ 07:01  -  12-10 @ 13:50  --------------------------------------------------------  IN: 225 mL / OUT: 0 mL / NET: 225 mL      Daily Height in cm: 187.96 (09 Dec 2022 16:09)    Daily Weight in k.2 (09 Dec 2022 22:35)      Constitutional: NAD.   HEENT: AT/NC, EOMI, Supple neck;  Respiratory: no wheezing or crackles. no increase in WOB  Cardiovascular: RRR, S1, S2, systolic murmur best heard at apex. 2+ distal pulses. JVP difficult to assess due to body habitus, yet +HJR, 3+ b/l LE edema all the way to buttocks. Scrotal swelling.  Gastrointestinal: soft; NT/ND, +BS  Extremities: no cyanosis; non-tender to palpation, DP and Radial pulses intact.  Neurological: A&Ox 3;  Psychiatric: normal mood/affect.    Cardiovascular Diagnostic Testing:  ECG: NSR HR 62. LAD, LVH w/ QRS widening.       Labs:                        9.8    9.68  )-----------( 233      ( 10 Dec 2022 06:54 )             30.9     12-10    136  |  106  |  55<H>  ----------------------------<  177<H>  4.5   |  19<L>  |  1.68<H>    Ca    8.6      10 Dec 2022 12:44  Phos  3.7     12-10  Mg     3.0     12-10    TPro  7.0  /  Alb  3.5  /  TBili  0.6  /  DBili  x   /  AST  35  /  ALT  44  /  AlkPhos  115  12-10    PT/INR - ( 10 Dec 2022 06:55 )   PT: 16.2 sec;   INR: 1.40 ratio         PTT - ( 10 Dec 2022 06:55 )  PTT:30.8 sec  CARDIAC MARKERS ( 09 Dec 2022 19:44 )  x     / x     / 662 U/L / x     / 9.8 ng/mL      Serum Pro-Brain Natriuretic Peptide: 1816 pg/mL ( @ 17:49)    Total Cholesterol: 67  LDL: --  HDL: 26  T

## 2022-12-10 NOTE — PHYSICAL THERAPY INITIAL EVALUATION ADULT - ADDITIONAL COMMENTS
Pt resides in private home with family , ramp to enter, one level within, PTA ambulatory with RW, has commode, set up with visiting RN & home PT s/p DC from recent hospital admission

## 2022-12-10 NOTE — PHYSICAL THERAPY INITIAL EVALUATION ADULT - GAIT DEVIATIONS NOTED, PT EVAL
decreased dev/increased time in double stance/decreased velocity of limb motion/decreased step length/decreased stride length/decreased weight-shifting ability

## 2022-12-10 NOTE — PROGRESS NOTE ADULT - PROBLEM SELECTOR PLAN 5
Continuing basal bolus insulin per prior admission levels.  - f/u AM a1c  - basal bolus: Lantus 45U QHS, Lispro 10U tid AC, low ISS ACHS per prior admission

## 2022-12-11 LAB
ALBUMIN SERPL ELPH-MCNC: 3.3 G/DL — SIGNIFICANT CHANGE UP (ref 3.3–5)
ALP SERPL-CCNC: 104 U/L — SIGNIFICANT CHANGE UP (ref 40–120)
ALT FLD-CCNC: 39 U/L — SIGNIFICANT CHANGE UP (ref 10–45)
ANION GAP SERPL CALC-SCNC: 12 MMOL/L — SIGNIFICANT CHANGE UP (ref 5–17)
APTT BLD: 21.2 SEC — LOW (ref 27.5–35.5)
AST SERPL-CCNC: 38 U/L — SIGNIFICANT CHANGE UP (ref 10–40)
BASE EXCESS BLDV CALC-SCNC: -3.4 MMOL/L — LOW (ref -2–3)
BILIRUB SERPL-MCNC: 0.6 MG/DL — SIGNIFICANT CHANGE UP (ref 0.2–1.2)
BUN SERPL-MCNC: 53 MG/DL — HIGH (ref 7–23)
CALCIUM SERPL-MCNC: 8.4 MG/DL — SIGNIFICANT CHANGE UP (ref 8.4–10.5)
CHLORIDE SERPL-SCNC: 106 MMOL/L — SIGNIFICANT CHANGE UP (ref 96–108)
CO2 BLDV-SCNC: 22 MMOL/L — SIGNIFICANT CHANGE UP (ref 22–26)
CO2 SERPL-SCNC: 20 MMOL/L — LOW (ref 22–31)
CREAT SERPL-MCNC: 1.74 MG/DL — HIGH (ref 0.5–1.3)
EGFR: 44 ML/MIN/1.73M2 — LOW
GAS PNL BLDV: SIGNIFICANT CHANGE UP
GLUCOSE BLDC GLUCOMTR-MCNC: 114 MG/DL — HIGH (ref 70–99)
GLUCOSE BLDC GLUCOMTR-MCNC: 130 MG/DL — HIGH (ref 70–99)
GLUCOSE BLDC GLUCOMTR-MCNC: 136 MG/DL — HIGH (ref 70–99)
GLUCOSE BLDC GLUCOMTR-MCNC: 173 MG/DL — HIGH (ref 70–99)
GLUCOSE SERPL-MCNC: 144 MG/DL — HIGH (ref 70–99)
HCO3 BLDV-SCNC: 21 MMOL/L — LOW (ref 22–29)
HCT VFR BLD CALC: 30.8 % — LOW (ref 39–50)
HGB BLD-MCNC: 9.7 G/DL — LOW (ref 13–17)
INR BLD: 1.28 RATIO — HIGH (ref 0.88–1.16)
MAGNESIUM SERPL-MCNC: 2.7 MG/DL — HIGH (ref 1.6–2.6)
MCHC RBC-ENTMCNC: 29.7 PG — SIGNIFICANT CHANGE UP (ref 27–34)
MCHC RBC-ENTMCNC: 31.5 GM/DL — LOW (ref 32–36)
MCV RBC AUTO: 94.2 FL — SIGNIFICANT CHANGE UP (ref 80–100)
NRBC # BLD: 0 /100 WBCS — SIGNIFICANT CHANGE UP (ref 0–0)
PCO2 BLDV: 37 MMHG — LOW (ref 42–55)
PH BLDV: 7.37 — SIGNIFICANT CHANGE UP (ref 7.32–7.43)
PHOSPHATE SERPL-MCNC: 3.7 MG/DL — SIGNIFICANT CHANGE UP (ref 2.5–4.5)
PLATELET # BLD AUTO: 255 K/UL — SIGNIFICANT CHANGE UP (ref 150–400)
PO2 BLDV: 131 MMHG — HIGH (ref 25–45)
POTASSIUM SERPL-MCNC: 4.6 MMOL/L — SIGNIFICANT CHANGE UP (ref 3.5–5.3)
POTASSIUM SERPL-SCNC: 4.6 MMOL/L — SIGNIFICANT CHANGE UP (ref 3.5–5.3)
PROT SERPL-MCNC: 6.6 G/DL — SIGNIFICANT CHANGE UP (ref 6–8.3)
PROTHROM AB SERPL-ACNC: 14.9 SEC — HIGH (ref 10.5–13.4)
RBC # BLD: 3.27 M/UL — LOW (ref 4.2–5.8)
RBC # FLD: 13.4 % — SIGNIFICANT CHANGE UP (ref 10.3–14.5)
SAO2 % BLDV: 99 % — HIGH (ref 67–88)
SODIUM SERPL-SCNC: 138 MMOL/L — SIGNIFICANT CHANGE UP (ref 135–145)
WBC # BLD: 10.31 K/UL — SIGNIFICANT CHANGE UP (ref 3.8–10.5)
WBC # FLD AUTO: 10.31 K/UL — SIGNIFICANT CHANGE UP (ref 3.8–10.5)

## 2022-12-11 PROCEDURE — 99233 SBSQ HOSP IP/OBS HIGH 50: CPT

## 2022-12-11 RX ORDER — ACETAMINOPHEN 500 MG
1000 TABLET ORAL ONCE
Refills: 0 | Status: COMPLETED | OUTPATIENT
Start: 2022-12-11 | End: 2022-12-11

## 2022-12-11 RX ORDER — GABAPENTIN 400 MG/1
100 CAPSULE ORAL DAILY
Refills: 0 | Status: DISCONTINUED | OUTPATIENT
Start: 2022-12-11 | End: 2022-12-23

## 2022-12-11 RX ADMIN — Medication 650 MILLIGRAM(S): at 13:02

## 2022-12-11 RX ADMIN — Medication 650 MILLIGRAM(S): at 15:21

## 2022-12-11 RX ADMIN — Medication 1 APPLICATION(S): at 05:03

## 2022-12-11 RX ADMIN — Medication 100 MILLIGRAM(S): at 09:10

## 2022-12-11 RX ADMIN — Medication 10 UNIT(S): at 12:59

## 2022-12-11 RX ADMIN — CARVEDILOL PHOSPHATE 25 MILLIGRAM(S): 80 CAPSULE, EXTENDED RELEASE ORAL at 09:10

## 2022-12-11 RX ADMIN — Medication 400 MILLIGRAM(S): at 16:19

## 2022-12-11 RX ADMIN — Medication 650 MILLIGRAM(S): at 06:05

## 2022-12-11 RX ADMIN — Medication 1000 MILLIGRAM(S): at 16:51

## 2022-12-11 RX ADMIN — HEPARIN SODIUM 5000 UNIT(S): 5000 INJECTION INTRAVENOUS; SUBCUTANEOUS at 21:56

## 2022-12-11 RX ADMIN — HEPARIN SODIUM 5000 UNIT(S): 5000 INJECTION INTRAVENOUS; SUBCUTANEOUS at 05:00

## 2022-12-11 RX ADMIN — CLOPIDOGREL BISULFATE 75 MILLIGRAM(S): 75 TABLET, FILM COATED ORAL at 13:00

## 2022-12-11 RX ADMIN — INSULIN GLARGINE 45 UNIT(S): 100 INJECTION, SOLUTION SUBCUTANEOUS at 21:56

## 2022-12-11 RX ADMIN — Medication 81 MILLIGRAM(S): at 13:00

## 2022-12-11 RX ADMIN — ATORVASTATIN CALCIUM 80 MILLIGRAM(S): 80 TABLET, FILM COATED ORAL at 21:56

## 2022-12-11 RX ADMIN — Medication 10 UNIT(S): at 17:30

## 2022-12-11 RX ADMIN — BUMETANIDE 2 MILLIGRAM(S): 0.25 INJECTION INTRAMUSCULAR; INTRAVENOUS at 04:52

## 2022-12-11 RX ADMIN — SPIRONOLACTONE 50 MILLIGRAM(S): 25 TABLET, FILM COATED ORAL at 06:57

## 2022-12-11 RX ADMIN — Medication 100 MILLIGRAM(S): at 17:30

## 2022-12-11 RX ADMIN — HEPARIN SODIUM 5000 UNIT(S): 5000 INJECTION INTRAVENOUS; SUBCUTANEOUS at 13:00

## 2022-12-11 RX ADMIN — BUMETANIDE 2 MILLIGRAM(S): 0.25 INJECTION INTRAMUSCULAR; INTRAVENOUS at 17:29

## 2022-12-11 RX ADMIN — Medication 1 APPLICATION(S): at 17:36

## 2022-12-11 RX ADMIN — Medication 10 UNIT(S): at 09:05

## 2022-12-11 RX ADMIN — GABAPENTIN 100 MILLIGRAM(S): 400 CAPSULE ORAL at 16:20

## 2022-12-11 RX ADMIN — Medication 60 MILLIGRAM(S): at 05:00

## 2022-12-11 RX ADMIN — Medication 650 MILLIGRAM(S): at 05:05

## 2022-12-11 NOTE — PROGRESS NOTE ADULT - PROBLEM SELECTOR PLAN 1
NSTEMI last month w/ LHC deferred d/t poor renal function. Now p/w ADHF. Currently following w/ Dr. Shaik Thomson  - start diuresis w/ IV bumex 2 bid, goal 2-2.5L per day  - strict I/O, 1L fluid restriction, daily weights  - no repeat TTE needed, as per cards  - eventual nuclear stress test, [facility] (unlikely during this admission)  - appreciate cards recs daily

## 2022-12-11 NOTE — PROGRESS NOTE ADULT - SUBJECTIVE AND OBJECTIVE BOX
Candida Castro MD  Internal Medicine, PGY-1        Patient Name: MARY ROBIN  Patient MRN: [id number]    Patient is a 62y old  Male who presents with a chief complaint of HF exacerbation (10 Dec 2022 13:49)      **SUBJECTIVE**    Last 24 hours: No acute events overnight. This AM, patient states that he feels improved since admission with decreased SOB. Denies chest pain, HA, lightheadedness.     Review of Systems negative as above      **OBJECTIVE**        VITALS:   T(C): 36.6 (22 @ 04:50), Max: 36.7 (12-10-22 @ 21:04)  HR: 58 (22 @ 04:50) (58 - 79)  BP: 162/69 (22 @ 04:50) (145/59 - 175/72)  RR: 20 (22 @ 04:50) (19 - 20)  SpO2: 95% (22 @ 04:50) (93% - 96%)    GENERAL: NAD, Obese  HEAD:  Atraumatic, Normocephalic  EYES: EOMI, PERRLA, conjunctiva and sclera clear  ENMT: No tonsillar erythema, exudates, or enlargement; Moist mucous membranes  NECK: Supple, No JVD, Normal thyroid  HEART: Regular rate and rhythm; No murmurs, rubs, or gallops  RESPIRATORY: +fine crackles b/l, equal breath sounds auscultated b/l. No wheezing.  ABDOMEN: +Distended +anasarca; Soft, Nontender; Bowel sounds present  NEUROLOGY: A&Ox3, nonfocal, moving all extremities  EXTREMITIES:  2+ Peripheral Pulses, No clubbing, cyanosis, or edema  SKIN: +3 pitting edema to abdomen, +erythema on b/l leg, +weeping b/l leg    Labs      138  |  106  |  53<H>  ----------------------------<  144<H>  4.6   |  20<L>  |  1.74<H>    Ca    8.4      11 Dec 2022 06:14  Phos  3.7     -  Mg     2.7     -    TPro  6.6  /  Alb  3.3  /  TBili  0.6  /  DBili  x   /  AST  38  /  ALT  39  /  AlkPhos  104  12-11    CBC Full  -  ( 10 Dec 2022 06:54 )  WBC Count : 9.68 K/uL  RBC Count : 3.35 M/uL  Hemoglobin : 9.8 g/dL  Hematocrit : 30.9 %  Platelet Count - Automated : 233 K/uL  Mean Cell Volume : 92.2 fl  Mean Cell Hemoglobin : 29.3 pg  Mean Cell Hemoglobin Concentration : 31.7 gm/dL  Auto Neutrophil # : x  Auto Lymphocyte # : x  Auto Monocyte # : x  Auto Eosinophil # : x  Auto Basophil # : x  Auto Neutrophil % : x  Auto Lymphocyte % : x  Auto Monocyte % : x  Auto Eosinophil % : x  Auto Basophil % : x    CARDIAC MARKERS ( 09 Dec 2022 19:44 )  x     / x     / 662 U/L / x     / 9.8 ng/mL        PT/INR - ( 10 Dec 2022 06:55 )   PT: 16.2 sec;   INR: 1.40 ratio         PTT - ( 11 Dec 2022 06:16 )  PTT:21.2 sec    POC Glucose  CAPILLARY BLOOD GLUCOSE      POCT Blood Glucose.: 209 mg/dL (10 Dec 2022 22:12)  POCT Blood Glucose.: 208 mg/dL (10 Dec 2022 21:01)  POCT Blood Glucose.: 130 mg/dL (10 Dec 2022 16:50)  POCT Blood Glucose.: 179 mg/dL (10 Dec 2022 12:01)  POCT Blood Glucose.: 178 mg/dL (10 Dec 2022 08:15)      I&O's  I&O's Summary    10 Dec 2022 07:01  -  11 Dec 2022 07:00  --------------------------------------------------------  IN: 225 mL / OUT: 1700 mL / NET: -1475 mL        UA (if applicable)  Urinalysis Basic - ( 09 Dec 2022 20:35 )    Color: Light Yellow / Appearance: Clear / S.017 / pH: x  Gluc: x / Ketone: Negative  / Bili: Negative / Urobili: Negative   Blood: x / Protein: 100 mg/dL / Nitrite: Negative   Leuk Esterase: Negative / RBC: 3 /hpf / WBC 2 /HPF   Sq Epi: x / Non Sq Epi: 1 /hpf / Bacteria: Negative        Micro (if applicable)      Imaging (if applicable)    Active Medications  MEDICATIONS  (STANDING):  aspirin enteric coated 81 milliGRAM(s) Oral daily  atorvastatin 80 milliGRAM(s) Oral at bedtime  buMETAnide Injectable 2 milliGRAM(s) IV Push every 12 hours  carvedilol 25 milliGRAM(s) Oral every 12 hours  clopidogrel Tablet 75 milliGRAM(s) Oral daily  dextrose 5%. 1000 milliLiter(s) (100 mL/Hr) IV Continuous <Continuous>  dextrose 5%. 1000 milliLiter(s) (50 mL/Hr) IV Continuous <Continuous>  dextrose 50% Injectable 25 Gram(s) IV Push once  dextrose 50% Injectable 12.5 Gram(s) IV Push once  dextrose 50% Injectable 25 Gram(s) IV Push once  glucagon  Injectable 1 milliGRAM(s) IntraMuscular once  heparin   Injectable 5000 Unit(s) SubCutaneous every 8 hours  hydrALAZINE 100 milliGRAM(s) Oral every 8 hours  insulin glargine Injectable (LANTUS) 45 Unit(s) SubCutaneous at bedtime  insulin lispro (ADMELOG) corrective regimen sliding scale   SubCutaneous three times a day before meals  insulin lispro (ADMELOG) corrective regimen sliding scale   SubCutaneous at bedtime  insulin lispro Injectable (ADMELOG) 10 Unit(s) SubCutaneous three times a day before meals  NIFEdipine XL 60 milliGRAM(s) Oral every 24 hours  spironolactone 50 milliGRAM(s) Oral daily  triamcinolone 0.1% Cream 1 Application(s) Topical every 12 hours    MEDICATIONS  (PRN):  acetaminophen     Tablet .. 650 milliGRAM(s) Oral every 6 hours PRN Temp greater or equal to 38C (100.4F), Mild Pain (1 - 3)  albuterol/ipratropium for Nebulization 3 milliLiter(s) Nebulizer every 6 hours PRN Shortness of Breath and/or Wheezing  aluminum hydroxide/magnesium hydroxide/simethicone Suspension 30 milliLiter(s) Oral every 4 hours PRN Dyspepsia  dextrose Oral Gel 15 Gram(s) Oral once PRN Blood Glucose LESS THAN 70 milliGRAM(s)/deciliter  melatonin 3 milliGRAM(s) Oral at bedtime PRN Insomnia  ondansetron Injectable 4 milliGRAM(s) IV Push every 8 hours PRN Nausea and/or Vomiting

## 2022-12-11 NOTE — PROGRESS NOTE ADULT - PROBLEM SELECTOR PLAN 4
previously treated with 7 days of levaquin for assumed b/l cellulitis. now w/ leukocytosis, not febrile.  - f/u doppler b/l LE  - wound care consulted; pending recs  - tx with topical steroid (TAC ointment)  - trend fever curve, WBCs  - consider Unasyn if febrile/worsening

## 2022-12-12 LAB
ALBUMIN SERPL ELPH-MCNC: 3.3 G/DL — SIGNIFICANT CHANGE UP (ref 3.3–5)
ALP SERPL-CCNC: 100 U/L — SIGNIFICANT CHANGE UP (ref 40–120)
ALT FLD-CCNC: 35 U/L — SIGNIFICANT CHANGE UP (ref 10–45)
ANION GAP SERPL CALC-SCNC: 12 MMOL/L — SIGNIFICANT CHANGE UP (ref 5–17)
AST SERPL-CCNC: 29 U/L — SIGNIFICANT CHANGE UP (ref 10–40)
BASE EXCESS BLDV CALC-SCNC: -2.8 MMOL/L — LOW (ref -2–3)
BILIRUB SERPL-MCNC: 0.5 MG/DL — SIGNIFICANT CHANGE UP (ref 0.2–1.2)
BUN SERPL-MCNC: 55 MG/DL — HIGH (ref 7–23)
CALCIUM SERPL-MCNC: 8.2 MG/DL — LOW (ref 8.4–10.5)
CHLORIDE SERPL-SCNC: 106 MMOL/L — SIGNIFICANT CHANGE UP (ref 96–108)
CO2 BLDV-SCNC: 25 MMOL/L — SIGNIFICANT CHANGE UP (ref 22–26)
CO2 SERPL-SCNC: 20 MMOL/L — LOW (ref 22–31)
CREAT SERPL-MCNC: 1.91 MG/DL — HIGH (ref 0.5–1.3)
EGFR: 39 ML/MIN/1.73M2 — LOW
GAS PNL BLDV: SIGNIFICANT CHANGE UP
GLUCOSE BLDC GLUCOMTR-MCNC: 115 MG/DL — HIGH (ref 70–99)
GLUCOSE BLDC GLUCOMTR-MCNC: 122 MG/DL — HIGH (ref 70–99)
GLUCOSE BLDC GLUCOMTR-MCNC: 184 MG/DL — HIGH (ref 70–99)
GLUCOSE BLDC GLUCOMTR-MCNC: 191 MG/DL — HIGH (ref 70–99)
GLUCOSE SERPL-MCNC: 130 MG/DL — HIGH (ref 70–99)
HCO3 BLDV-SCNC: 23 MMOL/L — SIGNIFICANT CHANGE UP (ref 22–29)
HCT VFR BLD CALC: 30.2 % — LOW (ref 39–50)
HGB BLD-MCNC: 9.4 G/DL — LOW (ref 13–17)
MAGNESIUM SERPL-MCNC: 2.7 MG/DL — HIGH (ref 1.6–2.6)
MCHC RBC-ENTMCNC: 29.2 PG — SIGNIFICANT CHANGE UP (ref 27–34)
MCHC RBC-ENTMCNC: 31.1 GM/DL — LOW (ref 32–36)
MCV RBC AUTO: 93.8 FL — SIGNIFICANT CHANGE UP (ref 80–100)
NRBC # BLD: 0 /100 WBCS — SIGNIFICANT CHANGE UP (ref 0–0)
NT-PROBNP SERPL-SCNC: 1651 PG/ML — HIGH (ref 0–300)
PCO2 BLDV: 44 MMHG — SIGNIFICANT CHANGE UP (ref 42–55)
PH BLDV: 7.33 — SIGNIFICANT CHANGE UP (ref 7.32–7.43)
PHOSPHATE SERPL-MCNC: 3.8 MG/DL — SIGNIFICANT CHANGE UP (ref 2.5–4.5)
PLATELET # BLD AUTO: 238 K/UL — SIGNIFICANT CHANGE UP (ref 150–400)
PO2 BLDV: 92 MMHG — HIGH (ref 25–45)
POTASSIUM SERPL-MCNC: 4.5 MMOL/L — SIGNIFICANT CHANGE UP (ref 3.5–5.3)
POTASSIUM SERPL-SCNC: 4.5 MMOL/L — SIGNIFICANT CHANGE UP (ref 3.5–5.3)
PROT SERPL-MCNC: 6.6 G/DL — SIGNIFICANT CHANGE UP (ref 6–8.3)
RBC # BLD: 3.22 M/UL — LOW (ref 4.2–5.8)
RBC # FLD: 13.5 % — SIGNIFICANT CHANGE UP (ref 10.3–14.5)
SAO2 % BLDV: 98.8 % — HIGH (ref 67–88)
SODIUM SERPL-SCNC: 138 MMOL/L — SIGNIFICANT CHANGE UP (ref 135–145)
WBC # BLD: 10.25 K/UL — SIGNIFICANT CHANGE UP (ref 3.8–10.5)
WBC # FLD AUTO: 10.25 K/UL — SIGNIFICANT CHANGE UP (ref 3.8–10.5)

## 2022-12-12 PROCEDURE — 99232 SBSQ HOSP IP/OBS MODERATE 35: CPT | Mod: GC

## 2022-12-12 PROCEDURE — 99233 SBSQ HOSP IP/OBS HIGH 50: CPT | Mod: GC

## 2022-12-12 PROCEDURE — 93970 EXTREMITY STUDY: CPT | Mod: 26

## 2022-12-12 PROCEDURE — 99222 1ST HOSP IP/OBS MODERATE 55: CPT

## 2022-12-12 RX ORDER — CLOPIDOGREL BISULFATE 75 MG/1
0 TABLET, FILM COATED ORAL
Qty: 0 | Refills: 0 | DISCHARGE

## 2022-12-12 RX ORDER — LEVOFLOXACIN 5 MG/ML
0 INJECTION, SOLUTION INTRAVENOUS
Qty: 0 | Refills: 0 | DISCHARGE

## 2022-12-12 RX ORDER — HEPARIN SODIUM 5000 [USP'U]/ML
0 INJECTION INTRAVENOUS; SUBCUTANEOUS
Qty: 0 | Refills: 0 | DISCHARGE

## 2022-12-12 RX ORDER — ATORVASTATIN CALCIUM 80 MG/1
0 TABLET, FILM COATED ORAL
Qty: 0 | Refills: 0 | DISCHARGE

## 2022-12-12 RX ORDER — AMLODIPINE BESYLATE 2.5 MG/1
1 TABLET ORAL
Qty: 0 | Refills: 0 | DISCHARGE

## 2022-12-12 RX ORDER — LISINOPRIL 2.5 MG/1
1 TABLET ORAL
Qty: 0 | Refills: 0 | DISCHARGE

## 2022-12-12 RX ORDER — HYDRALAZINE HCL 50 MG
0 TABLET ORAL
Qty: 0 | Refills: 0 | DISCHARGE

## 2022-12-12 RX ORDER — BACITRACIN AND POLYMYXIN B SULFATE 500; 10000 [USP'U]/G; [USP'U]/G
1 OINTMENT TOPICAL
Qty: 0 | Refills: 0 | DISCHARGE

## 2022-12-12 RX ORDER — INSULIN LISPRO 100/ML
10 VIAL (ML) SUBCUTANEOUS
Qty: 0 | Refills: 0 | DISCHARGE

## 2022-12-12 RX ORDER — METOPROLOL TARTRATE 50 MG
1 TABLET ORAL
Qty: 0 | Refills: 0 | DISCHARGE

## 2022-12-12 RX ORDER — INSULIN GLARGINE 100 [IU]/ML
47 INJECTION, SOLUTION SUBCUTANEOUS
Qty: 0 | Refills: 0 | DISCHARGE

## 2022-12-12 RX ORDER — LOSARTAN POTASSIUM 100 MG/1
0 TABLET, FILM COATED ORAL
Qty: 0 | Refills: 0 | DISCHARGE

## 2022-12-12 RX ORDER — CARVEDILOL PHOSPHATE 80 MG/1
0 CAPSULE, EXTENDED RELEASE ORAL
Qty: 0 | Refills: 0 | DISCHARGE

## 2022-12-12 RX ORDER — SENNA PLUS 8.6 MG/1
1 TABLET ORAL
Qty: 0 | Refills: 0 | DISCHARGE

## 2022-12-12 RX ORDER — FUROSEMIDE 40 MG
40 TABLET ORAL
Qty: 0 | Refills: 0 | DISCHARGE

## 2022-12-12 RX ORDER — MAGNESIUM SULFATE 500 MG/ML
2 VIAL (ML) INJECTION ONCE
Refills: 0 | Status: COMPLETED | OUTPATIENT
Start: 2022-12-12 | End: 2022-12-12

## 2022-12-12 RX ORDER — INSULIN LISPRO 100/ML
0 VIAL (ML) SUBCUTANEOUS
Qty: 0 | Refills: 0 | DISCHARGE

## 2022-12-12 RX ORDER — DOCUSATE SODIUM 100 MG
1 CAPSULE ORAL
Qty: 0 | Refills: 0 | DISCHARGE

## 2022-12-12 RX ORDER — CEFTRIAXONE 500 MG/1
0 INJECTION, POWDER, FOR SOLUTION INTRAMUSCULAR; INTRAVENOUS
Qty: 0 | Refills: 0 | DISCHARGE

## 2022-12-12 RX ORDER — AMLODIPINE BESYLATE 2.5 MG/1
0 TABLET ORAL
Qty: 0 | Refills: 0 | DISCHARGE

## 2022-12-12 RX ORDER — LOSARTAN POTASSIUM 100 MG/1
1 TABLET, FILM COATED ORAL
Qty: 0 | Refills: 0 | DISCHARGE

## 2022-12-12 RX ORDER — ASPIRIN/CALCIUM CARB/MAGNESIUM 324 MG
1 TABLET ORAL
Qty: 0 | Refills: 0 | DISCHARGE

## 2022-12-12 RX ORDER — INSULIN GLARGINE 100 [IU]/ML
0 INJECTION, SOLUTION SUBCUTANEOUS
Qty: 0 | Refills: 0 | DISCHARGE

## 2022-12-12 RX ORDER — CLOTRIMAZOLE AND BETAMETHASONE DIPROPIONATE 10; .5 MG/G; MG/G
1 CREAM TOPICAL
Refills: 0 | Status: DISCONTINUED | OUTPATIENT
Start: 2022-12-12 | End: 2022-12-23

## 2022-12-12 RX ORDER — CLOPIDOGREL BISULFATE 75 MG/1
1 TABLET, FILM COATED ORAL
Qty: 0 | Refills: 0 | DISCHARGE

## 2022-12-12 RX ADMIN — Medication 1 APPLICATION(S): at 05:56

## 2022-12-12 RX ADMIN — HEPARIN SODIUM 5000 UNIT(S): 5000 INJECTION INTRAVENOUS; SUBCUTANEOUS at 11:55

## 2022-12-12 RX ADMIN — Medication 10 UNIT(S): at 08:38

## 2022-12-12 RX ADMIN — Medication 650 MILLIGRAM(S): at 22:39

## 2022-12-12 RX ADMIN — Medication 650 MILLIGRAM(S): at 23:39

## 2022-12-12 RX ADMIN — BUMETANIDE 2 MILLIGRAM(S): 0.25 INJECTION INTRAMUSCULAR; INTRAVENOUS at 16:49

## 2022-12-12 RX ADMIN — Medication 60 MILLIGRAM(S): at 05:10

## 2022-12-12 RX ADMIN — Medication 650 MILLIGRAM(S): at 10:00

## 2022-12-12 RX ADMIN — BUMETANIDE 2 MILLIGRAM(S): 0.25 INJECTION INTRAMUSCULAR; INTRAVENOUS at 04:53

## 2022-12-12 RX ADMIN — Medication 1: at 11:56

## 2022-12-12 RX ADMIN — Medication 10 UNIT(S): at 16:47

## 2022-12-12 RX ADMIN — SPIRONOLACTONE 50 MILLIGRAM(S): 25 TABLET, FILM COATED ORAL at 05:10

## 2022-12-12 RX ADMIN — CARVEDILOL PHOSPHATE 25 MILLIGRAM(S): 80 CAPSULE, EXTENDED RELEASE ORAL at 22:39

## 2022-12-12 RX ADMIN — HEPARIN SODIUM 5000 UNIT(S): 5000 INJECTION INTRAVENOUS; SUBCUTANEOUS at 22:39

## 2022-12-12 RX ADMIN — GABAPENTIN 100 MILLIGRAM(S): 400 CAPSULE ORAL at 11:55

## 2022-12-12 RX ADMIN — Medication 81 MILLIGRAM(S): at 11:54

## 2022-12-12 RX ADMIN — Medication 100 MILLIGRAM(S): at 08:37

## 2022-12-12 RX ADMIN — Medication 650 MILLIGRAM(S): at 08:43

## 2022-12-12 RX ADMIN — CLOTRIMAZOLE AND BETAMETHASONE DIPROPIONATE 1 APPLICATION(S): 10; .5 CREAM TOPICAL at 16:48

## 2022-12-12 RX ADMIN — HEPARIN SODIUM 5000 UNIT(S): 5000 INJECTION INTRAVENOUS; SUBCUTANEOUS at 05:10

## 2022-12-12 RX ADMIN — INSULIN GLARGINE 45 UNIT(S): 100 INJECTION, SOLUTION SUBCUTANEOUS at 22:38

## 2022-12-12 RX ADMIN — Medication 100 MILLIGRAM(S): at 22:39

## 2022-12-12 RX ADMIN — ATORVASTATIN CALCIUM 80 MILLIGRAM(S): 80 TABLET, FILM COATED ORAL at 22:39

## 2022-12-12 RX ADMIN — Medication 100 MILLIGRAM(S): at 16:52

## 2022-12-12 RX ADMIN — Medication 10 UNIT(S): at 11:56

## 2022-12-12 RX ADMIN — CARVEDILOL PHOSPHATE 25 MILLIGRAM(S): 80 CAPSULE, EXTENDED RELEASE ORAL at 08:37

## 2022-12-12 RX ADMIN — CLOPIDOGREL BISULFATE 75 MILLIGRAM(S): 75 TABLET, FILM COATED ORAL at 11:55

## 2022-12-12 NOTE — DIETITIAN INITIAL EVALUATION ADULT - REASON
nutrition-focused physical examination not indicated at this time. No overt signs of muscle/fat depletion visually observed, pt with good PO intake.

## 2022-12-12 NOTE — DIETITIAN INITIAL EVALUATION ADULT - NSFNSNUTRHOMESUPPLEMENTFT_GEN_A_CORE
Pt reports use of a men's multivitamin supplement PTA. Denies use of any additional nutrition/dietary supplements PTA.

## 2022-12-12 NOTE — DIETITIAN INITIAL EVALUATION ADULT - ADD RECOMMEND
1) Continue DASH, Consistent Carbohydrate diet - fluid restriction per team.   2) RD to add Diet Mighty Shakes 3x/day to assist pt in meeting estimated needs.   3) Monitor PO intake, GI tolerance, skin integrity, labs, weight, and bowel movement regularity.   4) Honor food preferences as feasible. Assist with meals PRN and encourage continued good PO intake.  5) RD remains available upon request and will follow-up per protocol

## 2022-12-12 NOTE — DIETITIAN INITIAL EVALUATION ADULT - NSFNSGIIOFT_GEN_A_CORE
Denies nausea, vomiting, constipation, diarrhea. Reports last BM was PTA. Pt not currently on bowel regimen.

## 2022-12-12 NOTE — DIETITIAN INITIAL EVALUATION ADULT - NSFNSADHERENCEPTAFT_GEN_A_CORE
Pt with hx of DM2. Reports taking insulin, 3x/day and long lasting insulin at night. Reports checking fingersticks ~2x/day. Reports typical readings ~190-200 mg/dl. A1c 8.7% indicates poor glycemic control.

## 2022-12-12 NOTE — DIETITIAN INITIAL EVALUATION ADULT - ENERGY INTAKE
Pt reports good PO intake in-house, confirmed by flowsheets and observed pt consumed 100% of lunch this afternoon.

## 2022-12-12 NOTE — PROGRESS NOTE ADULT - ASSESSMENT
62M former smoker w/ HFpEF (TTE EF 57%, 11/14/22), HTN, HLD, IDDM, and recent NSTEMI, who presented with worsening HF exacerbation in setting of not having diuretics at home.    # Acute decompensated heart failure  - Patient continues to exhibit hypervolemia on exam   - Currently on NC.  O2 as appropriate  - Continue IV Bumex 2mg BID.   - c/w spironolactone  - consider SGLT as outpatient.  - Strict I/O and daily STANDING weight.  - Check electrolytes. K> 4 and Mg >2  - monitor on telemetry  - troponin has downtrended. Patient reports no chest pain, unlikely had recurrent ischemic event. HF exacerbation likely due to lack of diuretics regimen at home. No need for repeat TTE.  - Pt unable to recall his cardiologist's name   - Once patient is more euvolemic with improved kidney function and off isolation with COVID, will discuss regarding ischemic evaluation.  Cardiac cath was delayed during last hospitalization given RADHA; Cr currently 1.91 uptrending.   - HTN management by primary team.      Bea Mitchell MD  Cardiology Fellow     Recommendations are preliminary until cosigned by attending    62M former smoker w/ HFpEF (TTE EF 57%, 11/14/22), HTN, HLD, IDDM, and recent NSTEMI, who presented with worsening HF exacerbation in setting of not having diuretics at home.    # Acute decompensated heart failure  - Patient continues to exhibit hypervolemia on exam   - Currently on NC.  O2 as appropriate  - Continue IV Bumex 2mg BID.   - c/w spironolactone  - consider SGLT as outpatient.  - Strict I/O and daily STANDING weight.  - Check electrolytes. K> 4 and Mg >2  - monitor on telemetry  - troponin has downtrended. Patient reports no chest pain, unlikely had recurrent ischemic event. HF exacerbation likely due to lack of diuretics regimen at home. No need for repeat TTE.  - Pt unable to recall his cardiologist's name   - Once patient is more euvolemic with improved kidney function and off isolation with COVID, will discuss regarding ischemic evaluation.  Cardiac cath was delayed during last hospitalization given RADHA; Cr currently 1.91 uptrending.   - HTN management by primary team.      Bea Mitchell MD  Cardiology Fellow

## 2022-12-12 NOTE — DIETITIAN INITIAL EVALUATION ADULT - REASON FOR ADMISSION
Edema    "62M former smoker w/ HFpEF (TTE 11/14/22 EF 57%), h/o NSTEMI, HTN, HLD, T2DM on insulin, p/w worsening SOB, leg/scrotal edema, c/f ADHF, found covid-19 positive."

## 2022-12-12 NOTE — PROGRESS NOTE ADULT - SUBJECTIVE AND OBJECTIVE BOX
Overnight Events: None     Review Of Systems: No chest pain, shortness of breath, or palpitations            Current Meds:  acetaminophen     Tablet .. 650 milliGRAM(s) Oral every 6 hours PRN  albuterol/ipratropium for Nebulization 3 milliLiter(s) Nebulizer every 6 hours PRN  aluminum hydroxide/magnesium hydroxide/simethicone Suspension 30 milliLiter(s) Oral every 4 hours PRN  aspirin enteric coated 81 milliGRAM(s) Oral daily  atorvastatin 80 milliGRAM(s) Oral at bedtime  buMETAnide Injectable 2 milliGRAM(s) IV Push every 12 hours  carvedilol 25 milliGRAM(s) Oral every 12 hours  clopidogrel Tablet 75 milliGRAM(s) Oral daily  clotrimazole/betamethasone Cream 1 Application(s) Topical two times a day  dextrose 5%. 1000 milliLiter(s) IV Continuous <Continuous>  dextrose 5%. 1000 milliLiter(s) IV Continuous <Continuous>  dextrose 50% Injectable 25 Gram(s) IV Push once  dextrose 50% Injectable 12.5 Gram(s) IV Push once  dextrose 50% Injectable 25 Gram(s) IV Push once  dextrose Oral Gel 15 Gram(s) Oral once PRN  gabapentin 100 milliGRAM(s) Oral daily  glucagon  Injectable 1 milliGRAM(s) IntraMuscular once  heparin   Injectable 5000 Unit(s) SubCutaneous every 8 hours  hydrALAZINE 100 milliGRAM(s) Oral every 8 hours  insulin glargine Injectable (LANTUS) 45 Unit(s) SubCutaneous at bedtime  insulin lispro (ADMELOG) corrective regimen sliding scale   SubCutaneous at bedtime  insulin lispro (ADMELOG) corrective regimen sliding scale   SubCutaneous three times a day before meals  insulin lispro Injectable (ADMELOG) 10 Unit(s) SubCutaneous three times a day before meals  melatonin 3 milliGRAM(s) Oral at bedtime PRN  NIFEdipine XL 60 milliGRAM(s) Oral every 24 hours  ondansetron Injectable 4 milliGRAM(s) IV Push every 8 hours PRN  spironolactone 50 milliGRAM(s) Oral daily      Vitals:  T(F): 98.1 (12-12), Max: 98.4 (12-11)  HR: 64 (12-12) (57 - 64)  BP: 163/62 (12-12) (145/64 - 169/77)  RR: 18 (12-12)  SpO2: 94% (12-12)  I&O's Summary    11 Dec 2022 07:01  -  12 Dec 2022 07:00  --------------------------------------------------------  IN: 720 mL / OUT: 1850 mL / NET: -1130 mL        Physical Exam:    Appearance: No acute distress; well appearing  Eyes: pink conjunctiva  HEENT: Normal oral mucosa  Cardiovascular: RRR, S1, S2, systolic murmu.JVP difficult to assess due to body habitus, 3+ LE edema with what appear to be chronic LE wounds   Respiratory: Clear to auscultation bilaterally  Gastrointestinal: soft, non-tender, non-distended   Musculoskeletal: No clubbing; no joint deformity   Neurologic: Normal speech, no facial asymmetry  Psychiatry: AAOx3, mood & affect appropriate  Skin: No rashes, ecchymoses, or cyanosis of exposed skin                         9.4    10.25 )-----------( 238      ( 12 Dec 2022 06:12 )             30.2     12-12    138  |  106  |  55<H>  ----------------------------<  130<H>  4.5   |  20<L>  |  1.91<H>    Ca    8.2<L>      12 Dec 2022 06:14  Phos  3.8     12-12  Mg     2.7     12-12    TPro  6.6  /  Alb  3.3  /  TBili  0.5  /  DBili  x   /  AST  29  /  ALT  35  /  AlkPhos  100  12-12    PT/INR - ( 11 Dec 2022 06:16 )   PT: 14.9 sec;   INR: 1.28 ratio         PTT - ( 11 Dec 2022 06:16 )  PTT:21.2 sec  CARDIAC MARKERS ( 09 Dec 2022 19:44 )  57 ng/L / x     / x     / 662 U/L / x     / 9.8 ng/mL  CARDIAC MARKERS ( 09 Dec 2022 17:49 )  65 ng/L / x     / x     / x     / x     / x          Serum Pro-Brain Natriuretic Peptide: 1651 pg/mL (12-12 @ 06:14)  Serum Pro-Brain Natriuretic Peptide: 1816 pg/mL (12-09 @ 17:49)              Interpretation of Telemetry: Sinus 50s-60s with PVCs

## 2022-12-12 NOTE — DIETITIAN INITIAL EVALUATION ADULT - PERSON TAUGHT/METHOD
Provided verbal education on heart healthy nutrition for people with diabetes. Emphasis on pairing carbohydrates with protein for glycemic control; portion sizes; choosing whole grains vs refined carbohydrates; limiting saturated fat + sodium intake; monitoring fluid intake; emphasis on lean protein. Good comprehension noted. Pt made aware RD to remain available for any questions./verbal instruction/patient instructed

## 2022-12-12 NOTE — PROGRESS NOTE ADULT - ATTENDING COMMENTS
62M former smoker w/ HFpEF (TTE 11/14/22 EF 57%), h/o NSTEMI, HTN, HLD, T2DM on insulin, p/w worsening SOB, leg/scrotal edema, c/f ADHF, found covid-19 positive.    #Acute on chronic systolic HF: (recent echo with EF 40-45%)   recent hosp ~ 1 month ago. not discharged home on diuretics due to renal dysfunction.   Cont with IV bumex 2mg BID. monitor StrictI+Os. daily weights. keep neg.   #COVID+: clinically with some mild cough. no significant dyspnea. mildly hypoxic.   will titrate off oxygen, cont to hold off treatment unless significant change  #RADHA on CKD3: Cr rising now on iv diuresis.   will cont to monitor

## 2022-12-12 NOTE — DIETITIAN INITIAL EVALUATION ADULT - REASON INDICATOR FOR ASSESSMENT
RD consult "Decompensated heart failure, salt and fluid restriction."  Source: pt, medical record. Chart reviewed, events noted.

## 2022-12-12 NOTE — MEDICAL STUDENT PROGRESS NOTE(EDUCATION) - NS MD HP STUD ASPLAN PLAN FT
1.  acute HF exacerbation  - 2mg IV BID with goal of 2-2.5L output  - 50mg daily spirolactone  - Cardio no TTE req  - 1L oral intake limit, daily weight, I&O  - when euvolemic and off covid precaution= cardoi due ischemic workup likely nuc stress test    2: COVID  - stable on RA asymtomatic, 95% SaO2  - diagnosed dec 9 precuations for 5 days until 12/14    3: CKD stage 3b  - base at 1.9-2 from year ago  - mild increase in Cr to 1.91 from 1.7 12/12  - Does not meet RADHA criteria, conitnue monitor daily SCr    4: Stasis dermatitis  - pending b/l lower extremity duplex  - wound care consulted pending note  - exam showning erythematous nontender L lower extremity but both swollen  - monitor fever, WBC for signs of cellulitus and abegin abx if change in status  - conitnue with diuretics,   -encourage ambulation and continue with tramincolone cream .1%    5: HTN  - 160/70 elevated  - just got BP meds in am monitor vitals for persistented elevated BP  - if continues to be in same rnage will up titrate nifedopine to 90 daily    6: DM2  - a1c=8.7  - serum glucose within goal  - conintue with lantus 45 daily, lispro 10 with meal, and ISS    7: HLD  - baby ASA and 80 atorvastatin    9: normocytic anemia  - base low/mid 10s  - possible secondary to EPO def from CKD  -will get reticuocyte count  - possible workup with Fe panela nd if gets to 8    10: DVT ppx  - 5000 sub q hep    11: Dispo  - PT home per recs

## 2022-12-12 NOTE — DIETITIAN INITIAL EVALUATION ADULT - PERTINENT LABORATORY DATA
12-12    138  |  106  |  55<H>  ----------------------------<  130<H>  4.5   |  20<L>  |  1.91<H>    Ca    8.2<L>      12 Dec 2022 06:14  Phos  3.8     12-12  Mg     2.7     12-12    TPro  6.6  /  Alb  3.3  /  TBili  0.5  /  DBili  x   /  AST  29  /  ALT  35  /  AlkPhos  100  12-12    CAPILLARY BLOOD GLUCOSE  POCT Blood Glucose.: 191 mg/dL (12 Dec 2022 11:36)  POCT Blood Glucose.: 115 mg/dL (12 Dec 2022 08:29)  POCT Blood Glucose.: 173 mg/dL (11 Dec 2022 21:41)  POCT Blood Glucose.: 114 mg/dL (11 Dec 2022 17:13)    A1C with Estimated Average Glucose Result: 8.7 % (12-10-22 @ 06:54)  A1C with Estimated Average Glucose Result: 9.4 % (11-11-22 @ 12:42)

## 2022-12-12 NOTE — PROGRESS NOTE ADULT - SUBJECTIVE AND OBJECTIVE BOX
Candida Castro MD  Internal Medicine, PGY-1        Patient Name: MARY ROBIN  Patient MRN: [id number]    Patient is a 62y old  Male who presents with a chief complaint of HF exacerbation (11 Dec 2022 07:02)      **SUBJECTIVE**    Last 24 hours: No acute events overnight. This AM, patient states he continues to feel well compared to admission. Denies CP, SOB, lightheadedness.     Review of Systems negative as above      **OBJECTIVE**        VITALS:   T(C): 36.7 (12-12-22 @ 04:41), Max: 36.9 (12-11-22 @ 22:21)  HR: 59 (12-12-22 @ 04:41) (57 - 63)  BP: 169/77 (12-12-22 @ 04:41) (145/64 - 169/77)  RR: 18 (12-12-22 @ 04:41) (18 - 18)  SpO2: 94% (12-12-22 @ 04:41) (93% - 95%)    GENERAL: NAD, lying in bed comfortably  HEAD:  Normocephalic  EYES: Sclera clear  ENT: Moist mucous membranes  NECK: Supple, No JVD  CHEST/LUNG: Clear to auscultation bilaterally; No rales, rhonchi, wheezing, or rubs. Unlabored respirations  HEART: Regular rate and rhythm; No murmurs, rubs, or gallops  ABDOMEN: BSx4; Soft, nontender, nondistended  EXTREMITIES:  diffuse bilateral edema with overlying chronic stasis dermatitis  NERVOUS SYSTEM:  A&Ox3, no focal deficits   SKIN: chronic stasis dermatitis (skin weeping, slouging, erythema)    Labs  12-12    138  |  106  |  55<H>  ----------------------------<  130<H>  4.5   |  20<L>  |  1.91<H>    Ca    8.2<L>      12 Dec 2022 06:14  Phos  3.8     12-12  Mg     2.7     12-12    TPro  6.6  /  Alb  3.3  /  TBili  0.5  /  DBili  x   /  AST  29  /  ALT  35  /  AlkPhos  100  12-12    CBC Full  -  ( 12 Dec 2022 06:12 )  WBC Count : 10.25 K/uL  RBC Count : 3.22 M/uL  Hemoglobin : 9.4 g/dL  Hematocrit : 30.2 %  Platelet Count - Automated : 238 K/uL  Mean Cell Volume : 93.8 fl  Mean Cell Hemoglobin : 29.2 pg  Mean Cell Hemoglobin Concentration : 31.1 gm/dL  Auto Neutrophil # : x  Auto Lymphocyte # : x  Auto Monocyte # : x  Auto Eosinophil # : x  Auto Basophil # : x  Auto Neutrophil % : x  Auto Lymphocyte % : x  Auto Monocyte % : x  Auto Eosinophil % : x  Auto Basophil % : x          PT/INR - ( 11 Dec 2022 06:16 )   PT: 14.9 sec;   INR: 1.28 ratio         PTT - ( 11 Dec 2022 06:16 )  PTT:21.2 sec    POC Glucose  CAPILLARY BLOOD GLUCOSE      POCT Blood Glucose.: 173 mg/dL (11 Dec 2022 21:41)  POCT Blood Glucose.: 114 mg/dL (11 Dec 2022 17:13)  POCT Blood Glucose.: 136 mg/dL (11 Dec 2022 12:20)  POCT Blood Glucose.: 130 mg/dL (11 Dec 2022 08:54)      I&O's  I&O's Summary    10 Dec 2022 07:01  -  11 Dec 2022 07:00  --------------------------------------------------------  IN: 225 mL / OUT: 2150 mL / NET: -1925 mL    11 Dec 2022 07:01  -  12 Dec 2022 06:58  --------------------------------------------------------  IN: 720 mL / OUT: 1850 mL / NET: -1130 mL        UA (if applicable)      Micro (if applicable)      Imaging (if applicable)    Active Medications  MEDICATIONS  (STANDING):  aspirin enteric coated 81 milliGRAM(s) Oral daily  atorvastatin 80 milliGRAM(s) Oral at bedtime  buMETAnide Injectable 2 milliGRAM(s) IV Push every 12 hours  carvedilol 25 milliGRAM(s) Oral every 12 hours  clopidogrel Tablet 75 milliGRAM(s) Oral daily  dextrose 5%. 1000 milliLiter(s) (100 mL/Hr) IV Continuous <Continuous>  dextrose 5%. 1000 milliLiter(s) (50 mL/Hr) IV Continuous <Continuous>  dextrose 50% Injectable 25 Gram(s) IV Push once  dextrose 50% Injectable 12.5 Gram(s) IV Push once  dextrose 50% Injectable 25 Gram(s) IV Push once  gabapentin 100 milliGRAM(s) Oral daily  glucagon  Injectable 1 milliGRAM(s) IntraMuscular once  heparin   Injectable 5000 Unit(s) SubCutaneous every 8 hours  hydrALAZINE 100 milliGRAM(s) Oral every 8 hours  insulin glargine Injectable (LANTUS) 45 Unit(s) SubCutaneous at bedtime  insulin lispro (ADMELOG) corrective regimen sliding scale   SubCutaneous three times a day before meals  insulin lispro (ADMELOG) corrective regimen sliding scale   SubCutaneous at bedtime  insulin lispro Injectable (ADMELOG) 10 Unit(s) SubCutaneous three times a day before meals  NIFEdipine XL 60 milliGRAM(s) Oral every 24 hours  spironolactone 50 milliGRAM(s) Oral daily  triamcinolone 0.1% Cream 1 Application(s) Topical every 12 hours    MEDICATIONS  (PRN):  acetaminophen     Tablet .. 650 milliGRAM(s) Oral every 6 hours PRN Temp greater or equal to 38C (100.4F), Mild Pain (1 - 3)  albuterol/ipratropium for Nebulization 3 milliLiter(s) Nebulizer every 6 hours PRN Shortness of Breath and/or Wheezing  aluminum hydroxide/magnesium hydroxide/simethicone Suspension 30 milliLiter(s) Oral every 4 hours PRN Dyspepsia  dextrose Oral Gel 15 Gram(s) Oral once PRN Blood Glucose LESS THAN 70 milliGRAM(s)/deciliter  melatonin 3 milliGRAM(s) Oral at bedtime PRN Insomnia  ondansetron Injectable 4 milliGRAM(s) IV Push every 8 hours PRN Nausea and/or Vomiting

## 2022-12-12 NOTE — DIETITIAN INITIAL EVALUATION ADULT - OTHER INFO
Reports  lbs. Endorses wt gain secondary to fluid retention since his heart attack 3 weeks ago.  Dosing wt: 300 lbs (12-09, stated)  Wt history per chart: 272 lbs (12-12, standing), 230 lbs (11-11), 280 lbs (06/03/2021), 243 lbs (12/31/2019). RD to continue to monitor weight trends as able/available.     Per chart, pt currently ordered for lantus, admelog SSI, admelog before meals, gabapentin, atorvastatin, IV bumex, and spironolactone in-house.

## 2022-12-12 NOTE — CONSULT NOTE ADULT - SUBJECTIVE AND OBJECTIVE BOX
Wound SURGERY CONSULT NOTE    HPI:  62M former smoker w/ HFpEF (TTE 22 EF 57%), h/o NSTEMI, HTN HLD T2DM on insulin, p/w worsening SOB, leg/scrotal edema, c/f ADHF. Patient was recently admitted - as transfer from [facility] for NSTEMI and CHF exacerbation with C deferred pending renal function improvement. Patient was discharged with outpatient follow up but noted that he was told to discontinue diuresis. Upon followup with outpatient cardiology for echo, leg doppler, and nuclear stress test, he was renewed on torsemide 20mg 2 tablets daily and given levofloxacin for his LE cellulitis. ECG during that visit revealed left anterior hemiblock old lateral wall MI and nonspecific ST-T wave abnormalities. Patient developed worsening cough and LE and scrotal edema over the past 2 weeks, causing him to be largely bedbound. Pt denied any fever, chills, n/v/c/d, dysuria, palpitations, chest pain, lightheadedness, LOC, sick contacts, recent travel. Of note, patient was reportedly previously on 100mg of torsemide intermittently in the past. In the ED, pt was hypertensive 189/76, HR 64, tachypneic 24/min,  SpO2 94% on room air, afebrile. RVP showed Covid+. CXR done showed interstitial pulmonary edema.     Wound consult requested by team to assist w/ management of  BLE wounds and swelling.  Pt sometimes wears ace wrapping or compression socks.  He only has one pair that aren't so new.   Pt uses a OTC cream to help with the redness on his legs that comes and goes.  He doesn't follow with any doctors.    Pt w/o c/o pain.  The drainage has improved since admission.  Redness and swelling about the same.  Offloading and pericare initiated upon admission. Pt denies scratches, bites, falls, trauma.  DSD / ACE wrapping used while awaiting consult.  Appetite good w/o weight loss. All questions asked and answered to pt's and family's expressed understanding and satisfaction.    Current Diet: Diet, DASH/TLC:   Sodium & Cholesterol Restricted  Consistent Carbohydrate Evening Snack (CSTCHOSN)  1000mL Fluid Restriction ([id number])     Special Instructions for Nursin milliLiter(s) to 2000 milliLiter(s) fluid restriction (12-10-22 @ 01:39)      PAST MEDICAL & SURGICAL HISTORY:  Type 2 diabetes mellitus    HTN (hypertension)    HLD (hyperlipidemia)    CAD (coronary artery disease)    S/P amputation of lesser toe, right    s/p cholecystectomy        REVIEW OF SYSTEMS: General/ Skin/ Vasc/ MSK: see HPI  All other systems negative    MEDICATIONS  (STANDING):  aspirin enteric coated 81 milliGRAM(s) Oral daily  atorvastatin 80 milliGRAM(s) Oral at bedtime  buMETAnide Injectable 2 milliGRAM(s) IV Push every 12 hours  carvedilol 25 milliGRAM(s) Oral every 12 hours  clopidogrel Tablet 75 milliGRAM(s) Oral daily  clotrimazole/betamethasone Cream 1 Application(s) Topical two times a day  dextrose 5%. 1000 milliLiter(s) (50 mL/Hr) IV Continuous <Continuous>  dextrose 5%. 1000 milliLiter(s) (100 mL/Hr) IV Continuous <Continuous>  dextrose 50% Injectable 25 Gram(s) IV Push once  dextrose 50% Injectable 12.5 Gram(s) IV Push once  dextrose 50% Injectable 25 Gram(s) IV Push once  gabapentin 100 milliGRAM(s) Oral daily  glucagon  Injectable 1 milliGRAM(s) IntraMuscular once  heparin   Injectable 5000 Unit(s) SubCutaneous every 8 hours  hydrALAZINE 100 milliGRAM(s) Oral every 8 hours  insulin glargine Injectable (LANTUS) 45 Unit(s) SubCutaneous at bedtime  insulin lispro (ADMELOG) corrective regimen sliding scale   SubCutaneous at bedtime  insulin lispro (ADMELOG) corrective regimen sliding scale   SubCutaneous three times a day before meals  insulin lispro Injectable (ADMELOG) 10 Unit(s) SubCutaneous three times a day before meals  NIFEdipine XL 60 milliGRAM(s) Oral every 24 hours  spironolactone 50 milliGRAM(s) Oral daily    MEDICATIONS  (PRN):  acetaminophen Tablet 650 milliGRAM(s) Oral every 6 hours PRN Temp greater or equal to 38C (100.4F), Mild Pain (1 - 3)  albuterol/ipratropium for Nebulization 3 milliLiter(s) Nebulizer every 6 hours PRN Shortness of Breath and/or Wheezing  aluminum hydroxide/magnesium hydroxide/simethicone Suspension 30 milliLiter(s) Oral every 4 hours PRN Dyspepsia  dextrose Oral Gel 15 Gram(s) Oral once PRN Blood Glucose LESS THAN 70 milliGRAM(s)/deciliter  melatonin 3 milliGRAM(s) Oral at bedtime PRN Insomnia  ondansetron Injectable 4 milliGRAM(s) IV Push every 8 hours PRN Nausea and/or Vomiting      No Known Allergies    SOCIAL HISTORY:  ; Former smoker, No current smoking, ETOH, drugs    FAMILY HISTORY: no h/o significant problems    PHYSICAL EXAM:  Vital Signs Last 24 Hrs  T(C): 36.4 (12 Dec 2022 12:25), Max: 36.9 (11 Dec 2022 22:21)  T(F): 97.5 (12 Dec 2022 12:25), Max: 98.4 (11 Dec 2022 22:21)  HR: 60 (12 Dec 2022 12:25) (58 - 64)  BP: 141/60 (12 Dec 2022 12:25) (141/60 - 169/77)  BP(mean): --  RR: 18 (12 Dec 2022 12:25) (18 - 18)  SpO2: 95% (12 Dec 2022 12:25) (94% - 95%)    Parameters below as of 12 Dec 2022 12:25  Patient On (Oxygen Delivery Method): nasal cannula      NAD,  A&Ox3, Obese  WD/ WN/ WG  Versa Care [id number]  HEENT:  NC/AT, EOMI, sclera clear, mucosa moist, throat clear, trachea midline, neck supple  Respiratory: nonlabored w/ equal chest rise  Gastrointestinal soft NT/ND  Neurology:  weakened strength & sensation grossly intact  Psych: calm/ appropriate  Musculoskeletal: FROM, no deformities/ contractures  Vascular: BLE equally warm,  no cyanosis, clubbing,  nor acute ischemia      BLE edema equal w/o BLE DP/PT pulses palpable     BLE hemosiderin staining     BLE w/ macerated sloughing skin     Lt 3& 4th toes w/ dried adherent blisters       serosanguinous drainage  No odor, increased warmth, tenderness, induration, fluctuance, nor crepitus  Skin:  moist w/ good turgor    LABS/ CULTURES/ RADIOLOGY:                        9.4    10.25 )-----------( 238      ( 12 Dec 2022 06:12 )             30.2       138  |  106  |  55  ----------------------------<  130      [22 @ 06:14]  4.5   |  20  |  1.91        Ca     8.2     [22 @ 06:14]      Mg     2.7     [22 @ 06:14]      Phos  3.8     [22 @ 06:14]    TPro  6.6  /  Alb  3.3  /  TBili  0.5  /  DBili  x   /  AST  29  /  ALT  35  /  AlkPhos  100  [22 @ 06:14]    PT/INR: PT 14.9 , INR 1.28       [22 @ 06:16]  PTT: 21.2       [22 @ 06:16]    A1C with Estimated Average Glucose 8.7% (12.10.22 @ 06:54)   `< from: [facility] Duplex Lower Ext Vein Scan, Bilat (22 @ 11:05) >    ACC: [id number] EXAM:  DUPLEX SCAN EXT VEINS LOWER BI                          PROCEDURE DATE:  2022          INTERPRETATION:  CLINICAL INFORMATION: COVID positive, lower extremity   swelling and erythema    COMPARISON: None available.    TECHNIQUE: Duplex sonography of the BILATERAL LOWER extremity veins with   color and spectral Doppler, with and without compression.    FINDINGS:    RIGHT:  Normal compressibility of the RIGHT common femoral, femoral and popliteal   veins.  Doppler examination shows normal spontaneous and phasic flow.  No RIGHT calf vein thrombosis is detected.    LEFT:  Normal compressibility of the LEFT common femoral, femoral and popliteal   veins.  Doppler examination shows normal spontaneous and phasic flow.  No LEFT calf vein thrombosis is detected.    IMPRESSION:  No evidence of deep venous thrombosis in either lower extremity.    < end of copied text >

## 2022-12-12 NOTE — DIETITIAN INITIAL EVALUATION ADULT - ORAL INTAKE PTA/DIET HISTORY
Pt reports good appetite/PO intake PTA, was somewhat adherent to a low sodium, diabetic diet, doesn't eat out very often, doesn't use salt in cooking at home.  Confirms NKFA.

## 2022-12-12 NOTE — PROGRESS NOTE ADULT - PROBLEM SELECTOR PLAN 1
NSTEMI last month w/ LHC deferred d/t poor renal function. Now p/w ADHF. Currently following w/ Dr. Shaik Thomson  - start diuresis w/ IV bumex 2 bid, goal 2-2.5L per day  - strict I/O, 1L fluid restriction, daily weights  - no repeat TTE needed, as per cards  - eventual nuclear stress test, [facility] (unlikely during this admission)  - appreciate cards recs daily

## 2022-12-12 NOTE — DIETITIAN INITIAL EVALUATION ADULT - NS FNS DIET ORDER
Diet, DASH/TLC:   Sodium & Cholesterol Restricted  Consistent Carbohydrate {Evening Snack} (CSTCHOSN)  1000mL Fluid Restriction ([id number])     Special Instructions for Nursin milliLiter(s) to 2000 milliLiter(s) fluid restriction (12-10-22 @ 01:39) [Active]

## 2022-12-12 NOTE — PROGRESS NOTE ADULT - ATTENDING COMMENTS
62 year old man, DM, HTN transferred from [facility] last month SOB and chest discomfort, elevated troponin, ADHF and NSTEMI. Successful diuresis and symptoms improved. Had no active chest pain, optimized volume status and managed RADHA on CKD - baseline 1.31 5/24/21. Planned coronary angiography once optimized. Off diuretic creatinine continued to gradually improve and as has had no symptoms in prolonged period of time, seemed best to defer angiography until renal function fully back to baseline. Discharged to home, but now returns volume overloaded, thus once again seeking to optimize.    To contact call Cardiology Fellow or Attending as listed on [email or web address] password: "[email or web address].

## 2022-12-12 NOTE — DIETITIAN INITIAL EVALUATION ADULT - OTHER CALCULATIONS
Fluid needs deferred to team. Energy, protein needs based on IBW +10%: 209 lbs/94.8 kg with consideration for BMI >30, HF, and CKD3.

## 2022-12-12 NOTE — DIETITIAN INITIAL EVALUATION ADULT - PERTINENT MEDS FT
MEDICATIONS  (STANDING):  aspirin enteric coated 81 milliGRAM(s) Oral daily  atorvastatin 80 milliGRAM(s) Oral at bedtime  buMETAnide Injectable 2 milliGRAM(s) IV Push every 12 hours  carvedilol 25 milliGRAM(s) Oral every 12 hours  clopidogrel Tablet 75 milliGRAM(s) Oral daily  clotrimazole/betamethasone Cream 1 Application(s) Topical two times a day  dextrose 5%. 1000 milliLiter(s) (50 mL/Hr) IV Continuous <Continuous>  dextrose 5%. 1000 milliLiter(s) (100 mL/Hr) IV Continuous <Continuous>  dextrose 50% Injectable 25 Gram(s) IV Push once  dextrose 50% Injectable 12.5 Gram(s) IV Push once  dextrose 50% Injectable 25 Gram(s) IV Push once  gabapentin 100 milliGRAM(s) Oral daily  glucagon  Injectable 1 milliGRAM(s) IntraMuscular once  heparin   Injectable 5000 Unit(s) SubCutaneous every 8 hours  hydrALAZINE 100 milliGRAM(s) Oral every 8 hours  insulin glargine Injectable (LANTUS) 45 Unit(s) SubCutaneous at bedtime  insulin lispro (ADMELOG) corrective regimen sliding scale   SubCutaneous at bedtime  insulin lispro (ADMELOG) corrective regimen sliding scale   SubCutaneous three times a day before meals  insulin lispro Injectable (ADMELOG) 10 Unit(s) SubCutaneous three times a day before meals  NIFEdipine XL 60 milliGRAM(s) Oral every 24 hours  spironolactone 50 milliGRAM(s) Oral daily    MEDICATIONS  (PRN):  acetaminophen     Tablet .. 650 milliGRAM(s) Oral every 6 hours PRN Temp greater or equal to 38C (100.4F), Mild Pain (1 - 3)  albuterol/ipratropium for Nebulization 3 milliLiter(s) Nebulizer every 6 hours PRN Shortness of Breath and/or Wheezing  aluminum hydroxide/magnesium hydroxide/simethicone Suspension 30 milliLiter(s) Oral every 4 hours PRN Dyspepsia  dextrose Oral Gel 15 Gram(s) Oral once PRN Blood Glucose LESS THAN 70 milliGRAM(s)/deciliter  melatonin 3 milliGRAM(s) Oral at bedtime PRN Insomnia  ondansetron Injectable 4 milliGRAM(s) IV Push every 8 hours PRN Nausea and/or Vomiting

## 2022-12-13 LAB
ALBUMIN SERPL ELPH-MCNC: 3.3 G/DL — SIGNIFICANT CHANGE UP (ref 3.3–5)
ALP SERPL-CCNC: 105 U/L — SIGNIFICANT CHANGE UP (ref 40–120)
ALT FLD-CCNC: 37 U/L — SIGNIFICANT CHANGE UP (ref 10–45)
ANION GAP SERPL CALC-SCNC: 13 MMOL/L — SIGNIFICANT CHANGE UP (ref 5–17)
AST SERPL-CCNC: 30 U/L — SIGNIFICANT CHANGE UP (ref 10–40)
BILIRUB SERPL-MCNC: 0.5 MG/DL — SIGNIFICANT CHANGE UP (ref 0.2–1.2)
BUN SERPL-MCNC: 52 MG/DL — HIGH (ref 7–23)
CALCIUM SERPL-MCNC: 8.4 MG/DL — SIGNIFICANT CHANGE UP (ref 8.4–10.5)
CHLORIDE SERPL-SCNC: 105 MMOL/L — SIGNIFICANT CHANGE UP (ref 96–108)
CO2 SERPL-SCNC: 20 MMOL/L — LOW (ref 22–31)
CREAT SERPL-MCNC: 1.8 MG/DL — HIGH (ref 0.5–1.3)
EGFR: 42 ML/MIN/1.73M2 — LOW
GLUCOSE BLDC GLUCOMTR-MCNC: 102 MG/DL — HIGH (ref 70–99)
GLUCOSE BLDC GLUCOMTR-MCNC: 110 MG/DL — HIGH (ref 70–99)
GLUCOSE BLDC GLUCOMTR-MCNC: 116 MG/DL — HIGH (ref 70–99)
GLUCOSE BLDC GLUCOMTR-MCNC: 182 MG/DL — HIGH (ref 70–99)
GLUCOSE BLDC GLUCOMTR-MCNC: 67 MG/DL — LOW (ref 70–99)
GLUCOSE BLDC GLUCOMTR-MCNC: 68 MG/DL — LOW (ref 70–99)
GLUCOSE BLDC GLUCOMTR-MCNC: 81 MG/DL — SIGNIFICANT CHANGE UP (ref 70–99)
GLUCOSE SERPL-MCNC: 153 MG/DL — HIGH (ref 70–99)
HCT VFR BLD CALC: 30.4 % — LOW (ref 39–50)
HGB BLD-MCNC: 9.4 G/DL — LOW (ref 13–17)
MAGNESIUM SERPL-MCNC: 2.5 MG/DL — SIGNIFICANT CHANGE UP (ref 1.6–2.6)
MCHC RBC-ENTMCNC: 29.2 PG — SIGNIFICANT CHANGE UP (ref 27–34)
MCHC RBC-ENTMCNC: 30.9 GM/DL — LOW (ref 32–36)
MCV RBC AUTO: 94.4 FL — SIGNIFICANT CHANGE UP (ref 80–100)
NRBC # BLD: 0 /100 WBCS — SIGNIFICANT CHANGE UP (ref 0–0)
PHOSPHATE SERPL-MCNC: 3.9 MG/DL — SIGNIFICANT CHANGE UP (ref 2.5–4.5)
PLATELET # BLD AUTO: 230 K/UL — SIGNIFICANT CHANGE UP (ref 150–400)
POTASSIUM SERPL-MCNC: 4.7 MMOL/L — SIGNIFICANT CHANGE UP (ref 3.5–5.3)
POTASSIUM SERPL-SCNC: 4.7 MMOL/L — SIGNIFICANT CHANGE UP (ref 3.5–5.3)
PROT SERPL-MCNC: 6.8 G/DL — SIGNIFICANT CHANGE UP (ref 6–8.3)
RBC # BLD: 3.22 M/UL — LOW (ref 4.2–5.8)
RBC # FLD: 13.5 % — SIGNIFICANT CHANGE UP (ref 10.3–14.5)
SODIUM SERPL-SCNC: 138 MMOL/L — SIGNIFICANT CHANGE UP (ref 135–145)
WBC # BLD: 8.17 K/UL — SIGNIFICANT CHANGE UP (ref 3.8–10.5)
WBC # FLD AUTO: 8.17 K/UL — SIGNIFICANT CHANGE UP (ref 3.8–10.5)

## 2022-12-13 PROCEDURE — 99233 SBSQ HOSP IP/OBS HIGH 50: CPT | Mod: GC

## 2022-12-13 PROCEDURE — 93306 TTE W/DOPPLER COMPLETE: CPT | Mod: 26

## 2022-12-13 RX ADMIN — Medication 650 MILLIGRAM(S): at 21:59

## 2022-12-13 RX ADMIN — INSULIN GLARGINE 45 UNIT(S): 100 INJECTION, SOLUTION SUBCUTANEOUS at 21:12

## 2022-12-13 RX ADMIN — GABAPENTIN 100 MILLIGRAM(S): 400 CAPSULE ORAL at 12:54

## 2022-12-13 RX ADMIN — BUMETANIDE 2 MILLIGRAM(S): 0.25 INJECTION INTRAMUSCULAR; INTRAVENOUS at 17:21

## 2022-12-13 RX ADMIN — Medication 100 MILLIGRAM(S): at 20:49

## 2022-12-13 RX ADMIN — CARVEDILOL PHOSPHATE 25 MILLIGRAM(S): 80 CAPSULE, EXTENDED RELEASE ORAL at 17:24

## 2022-12-13 RX ADMIN — BUMETANIDE 2 MILLIGRAM(S): 0.25 INJECTION INTRAMUSCULAR; INTRAVENOUS at 05:06

## 2022-12-13 RX ADMIN — Medication 650 MILLIGRAM(S): at 20:59

## 2022-12-13 RX ADMIN — ATORVASTATIN CALCIUM 80 MILLIGRAM(S): 80 TABLET, FILM COATED ORAL at 20:49

## 2022-12-13 RX ADMIN — CLOTRIMAZOLE AND BETAMETHASONE DIPROPIONATE 1 APPLICATION(S): 10; .5 CREAM TOPICAL at 06:04

## 2022-12-13 RX ADMIN — HEPARIN SODIUM 5000 UNIT(S): 5000 INJECTION INTRAVENOUS; SUBCUTANEOUS at 14:50

## 2022-12-13 RX ADMIN — CLOPIDOGREL BISULFATE 75 MILLIGRAM(S): 75 TABLET, FILM COATED ORAL at 12:54

## 2022-12-13 RX ADMIN — Medication 10 UNIT(S): at 08:33

## 2022-12-13 RX ADMIN — Medication 100 MILLIGRAM(S): at 14:50

## 2022-12-13 RX ADMIN — Medication 10 UNIT(S): at 12:43

## 2022-12-13 RX ADMIN — Medication 60 MILLIGRAM(S): at 05:06

## 2022-12-13 RX ADMIN — HEPARIN SODIUM 5000 UNIT(S): 5000 INJECTION INTRAVENOUS; SUBCUTANEOUS at 05:29

## 2022-12-13 RX ADMIN — Medication 81 MILLIGRAM(S): at 12:54

## 2022-12-13 RX ADMIN — HEPARIN SODIUM 5000 UNIT(S): 5000 INJECTION INTRAVENOUS; SUBCUTANEOUS at 20:49

## 2022-12-13 RX ADMIN — SPIRONOLACTONE 50 MILLIGRAM(S): 25 TABLET, FILM COATED ORAL at 05:29

## 2022-12-13 NOTE — PROGRESS NOTE ADULT - SUBJECTIVE AND OBJECTIVE BOX
Overnight Events: None     Review Of Systems: No chest pain, shortness of breath, or palpitations            Current Meds:  acetaminophen     Tablet .. 650 milliGRAM(s) Oral every 6 hours PRN  albuterol/ipratropium for Nebulization 3 milliLiter(s) Nebulizer every 6 hours PRN  aluminum hydroxide/magnesium hydroxide/simethicone Suspension 30 milliLiter(s) Oral every 4 hours PRN  aspirin enteric coated 81 milliGRAM(s) Oral daily  atorvastatin 80 milliGRAM(s) Oral at bedtime  buMETAnide Injectable 2 milliGRAM(s) IV Push every 12 hours  carvedilol 25 milliGRAM(s) Oral every 12 hours  clopidogrel Tablet 75 milliGRAM(s) Oral daily  clotrimazole/betamethasone Cream 1 Application(s) Topical two times a day  dextrose 5%. 1000 milliLiter(s) IV Continuous <Continuous>  dextrose 5%. 1000 milliLiter(s) IV Continuous <Continuous>  dextrose 50% Injectable 25 Gram(s) IV Push once  dextrose 50% Injectable 12.5 Gram(s) IV Push once  dextrose 50% Injectable 25 Gram(s) IV Push once  dextrose Oral Gel 15 Gram(s) Oral once PRN  gabapentin 100 milliGRAM(s) Oral daily  glucagon  Injectable 1 milliGRAM(s) IntraMuscular once  heparin   Injectable 5000 Unit(s) SubCutaneous every 8 hours  hydrALAZINE 100 milliGRAM(s) Oral every 8 hours  insulin glargine Injectable (LANTUS) 45 Unit(s) SubCutaneous at bedtime  insulin lispro (ADMELOG) corrective regimen sliding scale   SubCutaneous at bedtime  insulin lispro (ADMELOG) corrective regimen sliding scale   SubCutaneous three times a day before meals  insulin lispro Injectable (ADMELOG) 10 Unit(s) SubCutaneous three times a day before meals  melatonin 3 milliGRAM(s) Oral at bedtime PRN  NIFEdipine XL 60 milliGRAM(s) Oral every 24 hours  ondansetron Injectable 4 milliGRAM(s) IV Push every 8 hours PRN  spironolactone 50 milliGRAM(s) Oral daily      Vitals:  T(F): 98 (12-13), Max: 98.1 (12-12)  HR: 58 (12-13) (57 - 72)  BP: 153/65 (12-13) (127/63 - 163/66)  RR: 18 (12-13)  SpO2: 96% (12-13)  I&O's Summary    12 Dec 2022 07:01  -  13 Dec 2022 07:00  --------------------------------------------------------  IN: 240 mL / OUT: 2300 mL / NET: -2060 mL        Physical Exam:    Appearance: No acute distress; well appearing  Eyes: pink conjunctiva  HEENT: Normal oral mucosa  Cardiovascular: RRR, S1, S2, systolic murmur, JVP difficult to assess due to body habitus, 3+ LE edema with what appear to be chronic LE wounds   Respiratory: Clear to auscultation bilaterally  Gastrointestinal: soft, non-tender, non-distended   Musculoskeletal: No clubbing; no joint deformity   Neurologic: Normal speech, no facial asymmetry  Psychiatry: AAOx3, mood & affect appropriate  Skin: No rashes, ecchymoses, or cyanosis of exposed skin                             9.4    8.17  )-----------( 230      ( 13 Dec 2022 06:07 )             30.4     12-13    138  |  105  |  52<H>  ----------------------------<  153<H>  4.7   |  20<L>  |  1.80<H>    Ca    8.4      13 Dec 2022 06:07  Phos  3.9     12-13  Mg     2.5     12-13    TPro  6.8  /  Alb  3.3  /  TBili  0.5  /  DBili  x   /  AST  30  /  ALT  37  /  AlkPhos  105  12-13      CARDIAC MARKERS ( 09 Dec 2022 19:44 )  57 ng/L / x     / x     / 662 U/L / x     / 9.8 ng/mL  CARDIAC MARKERS ( 09 Dec 2022 17:49 )  65 ng/L / x     / x     / x     / x     / x          Serum Pro-Brain Natriuretic Peptide: 1651 pg/mL (12-12 @ 06:14)  Serum Pro-Brain Natriuretic Peptide: 1816 pg/mL (12-09 @ 17:49)      Interpretation of Telemetry: Sinus 50s-60s

## 2022-12-13 NOTE — MEDICAL STUDENT PROGRESS NOTE(EDUCATION) - NS MD HP STUD ASPLAN PLAN FT
1.  acute HF exacerbation  - 2mg IV BID with goal of 2-2.5L output   - 50mg daily spirolactone  - Cardio TTE req will find out why recent change  - 1L oral intake limit, daily weight, I&O  - when euvolemic and off covid precaution= cardoi due ischemic workup indicating cath  - appreciate cardio recs    2: COVID  - stable on 2L NC asymtomatic, 95% SaO2 not requiring steriods or treatment for covid  - diagnosed dec 9 precuations for 5 days until 12/14  -after day 5 of diagnosis can change to non covid room and continue with cardiac procedure    3: CKD stage 3b  - base at 1.9-2 from year ago  - serum Cr lower then baseline, no RADHA as was monitoring previously    4: Stasis dermatitis  - exam showning erythematous nontender bilateral lower extremity but both swollen  - - b/l lower extremity duplex  - wound care consulted now giving fungal/b-methasone cream BID  - monitor fever, WBC for signs of cellulitus and abegin abx if change in status  - conitnue with diuretics  -encourage ambulation    5: HTN  - 150/70 elevated  - continue with HTN medication mangement  -monitor vitals for changes in BP/HR    6: DM2  - a1c=8.7  - serum glucose within goal  - conintue with lantus 45 daily, lispro 10 with meal, and ISS    7: HLD  - baby ASA and 80 atorvastatin    9: normocytic anemia  - base low/mid 10s  - possible secondary to EPO def from CKD  -will get reticuocyte count  - possible workup with Fe panela nd if gets to 8    10: DVT ppx  - 5000 sub q hep    11: Dispo  - PT home per recs  - diet DASH + shakes per dietician rec   1.  acute HF exacerbation  - 2mg IV bumix BID with goal of 2-2.5L output   - 50mg daily spirolactone  - Cardio TTE req will find out why recent change  - 1L oral intake limit, daily weight, I&O  - when euvolemic and off covid precaution= cardoi due ischemic workup indicating cath  - appreciate cardio recs    2: COVID  - stable on 2L NC asymtomatic, 95% SaO2 not requiring steriods or treatment for covid  - diagnosed dec 9 precuations for 5 days until 12/14  -after day 5 of diagnosis can change to non covid room and continue with cardiac procedure    3: CKD stage 3b  - base at 1.9-2 from year ago  - serum Cr lower then baseline, no RADHA as was monitoring previously    4: Stasis dermatitis  - exam showning erythematous nontender bilateral lower extremity but both swollen  - - b/l lower extremity duplex  - wound care consulted now giving fungal/b-methasone cream BID  - monitor fever, WBC for signs of cellulitus and abegin abx if change in status  - conitnue with diuretics  -encourage ambulation    5: HTN  - 150/70 elevated  - continue with HTN medication mangement  -monitor vitals for changes in BP/HR    6: DM2  - a1c=8.7  - serum glucose within goal  - conintue with lantus 45 daily, lispro 10 with meal, and ISS    7: HLD  - baby ASA and 80 atorvastatin    9: normocytic anemia  - base low/mid 10s  - possible secondary to EPO def from CKD  -will get reticuocyte count  - possible workup with Fe panela nd if gets to 8    10: DVT ppx  - 5000 sub q hep    11: Dispo  - PT home per recs  - diet DASH + shakes per dietician rec

## 2022-12-13 NOTE — PROGRESS NOTE ADULT - PROBLEM SELECTOR PLAN 1
NSTEMI last month w/ LHC deferred d/t poor renal function. Now p/w ADHF. Currently following w/ Dr. Shaik Thomson  - start diuresis w/ IV bumex 2 bid, goal 2-2.5L per day  - strict I/O, 1L fluid restriction, daily weights  - no repeat TTE needed, as per cards  - eventual nuclear stress test, [facility] (unlikely during this admission)  - appreciate cards recs daily

## 2022-12-13 NOTE — PROGRESS NOTE ADULT - ATTENDING COMMENTS
62 year old man, DM, HTN transferred from [facility] last month SOB and chest discomfort, elevated troponin, ADHF and NSTEMI. Successful diuresis and symptoms improved. Had no active chest pain, optimized volume status and managed RADHA on CKD - baseline 1.31 5/24/21. Planned coronary angiography once optimized. Off diuretic creatinine continued to gradually improve and as has had no symptoms in prolonged period of time, seemed best to defer angiography until renal function fully back to baseline. Discharged to home, but returns volume overloaded, thus once again seeking to optimize. Still testing positive for COVID and seeking explanation and determination as to when can proceed with coronary angiography.    To contact call Cardiology Fellow or Attending as listed on [email or web address] password: [email or web address].

## 2022-12-13 NOTE — PROGRESS NOTE ADULT - SUBJECTIVE AND OBJECTIVE BOX
Candida Castro MD  Internal Medicine, PGY-1        Patient Name: MARY ROBIN  Patient MRN: [id number]    Patient is a 62y old  Male who presents with a chief complaint of Edema    "62M former smoker w/ HFpEF (TTE 11/14/22 EF 57%), h/o NSTEMI, HTN, HLD, T2DM on insulin, p/w worsening SOB, leg/scrotal edema, c/f ADHF, found covid-19 positive." (12 Dec 2022 13:37)      **SUBJECTIVE**    Last 24 hours: No acute events overnight. This AM, patient feels well and continues to notice improvement since admission. Does endorse continued scrotal swelling which he states has only minimally improved since admission. Also states that this symptom is new to this admission, and did not occur during prior heart failure exacerbations. Denies CP, SOB, lightheadedness,. n/v.     Review of Systems negative as above       **OBJECTIVE**        VITALS:   T(C): 36.7 (12-13-22 @ 05:25), Max: 36.7 (12-12-22 @ 21:52)  HR: 58 (12-13-22 @ 05:25) (57 - 72)  BP: 153/65 (12-13-22 @ 05:25) (127/63 - 163/66)  RR: 18 (12-13-22 @ 05:25) (18 - 18)  SpO2: 96% (12-13-22 @ 05:25) (93% - 96%)    GENERAL: NAD, lying in bed comfortably  HEAD:  Normocephalic  EYES: Sclera clear  ENT: Moist mucous membranes  NECK: Supple, No JVD  CHEST/LUNG: Clear to auscultation bilaterally; No rales, rhonchi, wheezing, or rubs. Unlabored respirations  HEART: Regular rate and rhythm; No murmurs, rubs, or gallops  ABDOMEN: BSx4; Soft, nontender, nondistended  EXTREMITIES:  significant b/l LE edema (somewhat improved since admission)  NERVOUS SYSTEM:  A&Ox3, no focal deficits   SKIN: venous stasis dermatitis with erythema, hyperpigmentation, sloughing, weeping on b/l LE     Labs  12-13    138  |  105  |  52<H>  ----------------------------<  153<H>  4.7   |  20<L>  |  1.80<H>    Ca    8.4      13 Dec 2022 06:07  Phos  3.9     12-13  Mg     2.5     12-13    TPro  6.8  /  Alb  3.3  /  TBili  0.5  /  DBili  x   /  AST  30  /  ALT  37  /  AlkPhos  105  12-13    CBC Full  -  ( 13 Dec 2022 06:07 )  WBC Count : 8.17 K/uL  RBC Count : 3.22 M/uL  Hemoglobin : 9.4 g/dL  Hematocrit : 30.4 %  Platelet Count - Automated : 230 K/uL  Mean Cell Volume : 94.4 fl  Mean Cell Hemoglobin : 29.2 pg  Mean Cell Hemoglobin Concentration : 30.9 gm/dL  Auto Neutrophil # : x  Auto Lymphocyte # : x  Auto Monocyte # : x  Auto Eosinophil # : x  Auto Basophil # : x  Auto Neutrophil % : x  Auto Lymphocyte % : x  Auto Monocyte % : x  Auto Eosinophil % : x  Auto Basophil % : x              POC Glucose  CAPILLARY BLOOD GLUCOSE      POCT Blood Glucose.: 184 mg/dL (12 Dec 2022 21:58)  POCT Blood Glucose.: 122 mg/dL (12 Dec 2022 16:46)  POCT Blood Glucose.: 191 mg/dL (12 Dec 2022 11:36)  POCT Blood Glucose.: 115 mg/dL (12 Dec 2022 08:29)      I&O's  I&O's Summary    11 Dec 2022 07:01  -  12 Dec 2022 07:00  --------------------------------------------------------  IN: 720 mL / OUT: 1850 mL / NET: -1130 mL    12 Dec 2022 07:01  -  13 Dec 2022 06:54  --------------------------------------------------------  IN: 240 mL / OUT: 2300 mL / NET: -2060 mL        UA (if applicable)      Micro (if applicable)      Imaging (if applicable)    Active Medications  MEDICATIONS  (STANDING):  aspirin enteric coated 81 milliGRAM(s) Oral daily  atorvastatin 80 milliGRAM(s) Oral at bedtime  buMETAnide Injectable 2 milliGRAM(s) IV Push every 12 hours  carvedilol 25 milliGRAM(s) Oral every 12 hours  clopidogrel Tablet 75 milliGRAM(s) Oral daily  clotrimazole/betamethasone Cream 1 Application(s) Topical two times a day  dextrose 5%. 1000 milliLiter(s) (100 mL/Hr) IV Continuous <Continuous>  dextrose 5%. 1000 milliLiter(s) (50 mL/Hr) IV Continuous <Continuous>  dextrose 50% Injectable 25 Gram(s) IV Push once  dextrose 50% Injectable 12.5 Gram(s) IV Push once  dextrose 50% Injectable 25 Gram(s) IV Push once  gabapentin 100 milliGRAM(s) Oral daily  glucagon  Injectable 1 milliGRAM(s) IntraMuscular once  heparin   Injectable 5000 Unit(s) SubCutaneous every 8 hours  hydrALAZINE 100 milliGRAM(s) Oral every 8 hours  insulin glargine Injectable (LANTUS) 45 Unit(s) SubCutaneous at bedtime  insulin lispro (ADMELOG) corrective regimen sliding scale   SubCutaneous at bedtime  insulin lispro (ADMELOG) corrective regimen sliding scale   SubCutaneous three times a day before meals  insulin lispro Injectable (ADMELOG) 10 Unit(s) SubCutaneous three times a day before meals  NIFEdipine XL 60 milliGRAM(s) Oral every 24 hours  spironolactone 50 milliGRAM(s) Oral daily    MEDICATIONS  (PRN):  acetaminophen     Tablet .. 650 milliGRAM(s) Oral every 6 hours PRN Temp greater or equal to 38C (100.4F), Mild Pain (1 - 3)  albuterol/ipratropium for Nebulization 3 milliLiter(s) Nebulizer every 6 hours PRN Shortness of Breath and/or Wheezing  aluminum hydroxide/magnesium hydroxide/simethicone Suspension 30 milliLiter(s) Oral every 4 hours PRN Dyspepsia  dextrose Oral Gel 15 Gram(s) Oral once PRN Blood Glucose LESS THAN 70 milliGRAM(s)/deciliter  melatonin 3 milliGRAM(s) Oral at bedtime PRN Insomnia  ondansetron Injectable 4 milliGRAM(s) IV Push every 8 hours PRN Nausea and/or Vomiting         Candida Castro MD  Internal Medicine, PGY-1        Patient Name: MARY ROBIN  Patient MRN: [id number]    Patient is a 62y old  Male who presents with a chief complaint of Edema    "62M former smoker w/ HFpEF (TTE 11/14/22 EF 57%), h/o NSTEMI, HTN, HLD, T2DM on insulin, p/w worsening SOB, leg/scrotal edema, c/f ADHF, found covid-19 positive." (12 Dec 2022 13:37)      **SUBJECTIVE**    Last 24 hours: No acute events overnight. This AM, patient feels well and continues to notice improvement since admission. Does endorse continued scrotal swelling but notes some improvement since admission. Denies CP, SOB, lightheadedness,. n/v.     Review of Systems negative as above       **OBJECTIVE**        VITALS:   T(C): 36.7 (12-13-22 @ 05:25), Max: 36.7 (12-12-22 @ 21:52)  HR: 58 (12-13-22 @ 05:25) (57 - 72)  BP: 153/65 (12-13-22 @ 05:25) (127/63 - 163/66)  RR: 18 (12-13-22 @ 05:25) (18 - 18)  SpO2: 96% (12-13-22 @ 05:25) (93% - 96%)    GENERAL: NAD, lying in bed comfortably  HEAD:  Normocephalic  EYES: Sclera clear  ENT: Moist mucous membranes  NECK: Supple, No JVD  CHEST/LUNG: Clear to auscultation bilaterally; No rales, rhonchi, wheezing, or rubs. Unlabored respirations  HEART: Regular rate and rhythm; No murmurs, rubs, or gallops  ABDOMEN: BSx4; Soft, nontender, nondistended  EXTREMITIES:  significant b/l LE edema (somewhat improved since admission)  NERVOUS SYSTEM:  A&Ox3, no focal deficits   SKIN: venous stasis dermatitis with erythema, hyperpigmentation, sloughing, weeping on b/l LE     Labs  12-13    138  |  105  |  52<H>  ----------------------------<  153<H>  4.7   |  20<L>  |  1.80<H>    Ca    8.4      13 Dec 2022 06:07  Phos  3.9     12-13  Mg     2.5     12-13    TPro  6.8  /  Alb  3.3  /  TBili  0.5  /  DBili  x   /  AST  30  /  ALT  37  /  AlkPhos  105  12-13    CBC Full  -  ( 13 Dec 2022 06:07 )  WBC Count : 8.17 K/uL  RBC Count : 3.22 M/uL  Hemoglobin : 9.4 g/dL  Hematocrit : 30.4 %  Platelet Count - Automated : 230 K/uL  Mean Cell Volume : 94.4 fl  Mean Cell Hemoglobin : 29.2 pg  Mean Cell Hemoglobin Concentration : 30.9 gm/dL  Auto Neutrophil # : x  Auto Lymphocyte # : x  Auto Monocyte # : x  Auto Eosinophil # : x  Auto Basophil # : x  Auto Neutrophil % : x  Auto Lymphocyte % : x  Auto Monocyte % : x  Auto Eosinophil % : x  Auto Basophil % : x              POC Glucose  CAPILLARY BLOOD GLUCOSE      POCT Blood Glucose.: 184 mg/dL (12 Dec 2022 21:58)  POCT Blood Glucose.: 122 mg/dL (12 Dec 2022 16:46)  POCT Blood Glucose.: 191 mg/dL (12 Dec 2022 11:36)  POCT Blood Glucose.: 115 mg/dL (12 Dec 2022 08:29)      I&O's  I&O's Summary    11 Dec 2022 07:01  -  12 Dec 2022 07:00  --------------------------------------------------------  IN: 720 mL / OUT: 1850 mL / NET: -1130 mL    12 Dec 2022 07:01  -  13 Dec 2022 06:54  --------------------------------------------------------  IN: 240 mL / OUT: 2300 mL / NET: -2060 mL        UA (if applicable)      Micro (if applicable)      Imaging (if applicable)    Active Medications  MEDICATIONS  (STANDING):  aspirin enteric coated 81 milliGRAM(s) Oral daily  atorvastatin 80 milliGRAM(s) Oral at bedtime  buMETAnide Injectable 2 milliGRAM(s) IV Push every 12 hours  carvedilol 25 milliGRAM(s) Oral every 12 hours  clopidogrel Tablet 75 milliGRAM(s) Oral daily  clotrimazole/betamethasone Cream 1 Application(s) Topical two times a day  dextrose 5%. 1000 milliLiter(s) (100 mL/Hr) IV Continuous <Continuous>  dextrose 5%. 1000 milliLiter(s) (50 mL/Hr) IV Continuous <Continuous>  dextrose 50% Injectable 25 Gram(s) IV Push once  dextrose 50% Injectable 12.5 Gram(s) IV Push once  dextrose 50% Injectable 25 Gram(s) IV Push once  gabapentin 100 milliGRAM(s) Oral daily  glucagon  Injectable 1 milliGRAM(s) IntraMuscular once  heparin   Injectable 5000 Unit(s) SubCutaneous every 8 hours  hydrALAZINE 100 milliGRAM(s) Oral every 8 hours  insulin glargine Injectable (LANTUS) 45 Unit(s) SubCutaneous at bedtime  insulin lispro (ADMELOG) corrective regimen sliding scale   SubCutaneous at bedtime  insulin lispro (ADMELOG) corrective regimen sliding scale   SubCutaneous three times a day before meals  insulin lispro Injectable (ADMELOG) 10 Unit(s) SubCutaneous three times a day before meals  NIFEdipine XL 60 milliGRAM(s) Oral every 24 hours  spironolactone 50 milliGRAM(s) Oral daily    MEDICATIONS  (PRN):  acetaminophen     Tablet .. 650 milliGRAM(s) Oral every 6 hours PRN Temp greater or equal to 38C (100.4F), Mild Pain (1 - 3)  albuterol/ipratropium for Nebulization 3 milliLiter(s) Nebulizer every 6 hours PRN Shortness of Breath and/or Wheezing  aluminum hydroxide/magnesium hydroxide/simethicone Suspension 30 milliLiter(s) Oral every 4 hours PRN Dyspepsia  dextrose Oral Gel 15 Gram(s) Oral once PRN Blood Glucose LESS THAN 70 milliGRAM(s)/deciliter  melatonin 3 milliGRAM(s) Oral at bedtime PRN Insomnia  ondansetron Injectable 4 milliGRAM(s) IV Push every 8 hours PRN Nausea and/or Vomiting

## 2022-12-13 NOTE — CHART NOTE - NSCHARTNOTEFT_GEN_A_CORE
Called by RN because patient was not wearing tele monitor and was not putting it back on and patient had been having issues wearing monitor throughout the day.     On arrival patient had monitor reattached. He stated that it was difficult to hold it and use the restroom at the same time. Endorsed to patient importance of remaining on the monitor, even in restroom as that is one of the most common places for tachy/bradyarrhythmias to manifest. Patient instructed to wear pouch around the neck that can hold the monitor so that he doesn't have to hold in his hands and he expressed understanding.

## 2022-12-13 NOTE — PROGRESS NOTE ADULT - ATTENDING COMMENTS
62M former smoker w/ HFpEF (TTE 11/14/22 EF 57%), h/o NSTEMI, HTN, HLD, T2DM on insulin, p/w worsening SOB, leg/scrotal edema, c/f ADHF, found covid-19 positive.    #Acute on chronic systolic HF: (recent echo with EF 40-45%)   recent hosp ~ 1 month ago. not discharged home on diuretics due to renal dysfunction.   Cont with IV bumex 2mg BID. monitor StrictI+Os. daily weights. keep neg.   eventual plans for further ischemic w/u - possible cath.   #COVID+: clinically with some mild cough. no significant dyspnea. mildly hypoxic.   will titrate off oxygen, cont to hold off treatment unless significant change  #RADHA on CKD3: Cr somewhat plateaued. will monitor while cont with diuresis.   strict I+Os, weight, Cr.

## 2022-12-13 NOTE — PROGRESS NOTE ADULT - ASSESSMENT
62M former smoker w/ HFpEF (TTE EF 57%, 11/14/22), HTN, HLD, IDDM, and recent NSTEMI, CKD III, who presented with worsening HF exacerbation in setting of not having diuretics at home.    # Acute decompensated heart failure  - Patient continues to exhibit hypervolemia on exam   - Currently on NC.  O2 as appropriate  - Continue IV Bumex 2mg BID.   - c/w spironolactone  - consider SGLT as outpatient.  - Strict I/O and daily STANDING weight.  - Check electrolytes. K> 4 and Mg >2  - monitor on telemetry  - troponin has downtrended. Patient reports no chest pain, unlikely had recurrent ischemic event. HF exacerbation likely due to lack of diuretics regimen at home. No need for repeat TTE.  - Once patient is more euvolemic with improved kidney function and off isolation with COVID, will discuss regarding ischemic evaluation.  Cardiac cath was delayed during last hospitalization given RADHA; Cr currently 1.8 today.  - Per primary team, baseline Cr ~1.9 due to CKD stage III   - Please clarify COVID-19 status, pt was known to be positive during prior hospitalization   - Attempt to lie flat today   - HTN management by primary team.      Bea Mitchell MD  Cardiology Fellow     Recommendations are preliminary until cosigned by attending          62M former smoker w/ HFpEF (TTE EF 57%, 11/14/22), HTN, HLD, IDDM, and recent NSTEMI, CKD III, who presented with worsening HF exacerbation in setting of not having diuretics at home.    # Acute decompensated heart failure  - Patient continues to exhibit hypervolemia on exam   - Currently on NC.  O2 as appropriate  - Continue IV Bumex 2mg BID.   - c/w spironolactone  - consider SGLT as outpatient.  - Strict I/O and daily STANDING weight.  - Check electrolytes. K> 4 and Mg >2  - monitor on telemetry  - troponin has downtrended. Patient reports no chest pain, unlikely had recurrent ischemic event. HF exacerbation likely due to lack of diuretics regimen at home. No need for repeat TTE.  - Once patient is more euvolemic with improved kidney function and off isolation with COVID, will discuss regarding ischemic evaluation.  Cardiac cath was delayed during last hospitalization given RADHA; Cr currently 1.8 today.  - Per primary team, baseline Cr ~1.9 due to CKD stage III   - Please clarify COVID-19 status, pt was known to be positive during prior hospitalization   - Attempt to lie flat today   - HTN management by primary team.      Bea Mitchell MD  Cardiology Fellow

## 2022-12-14 DIAGNOSIS — N17.9 ACUTE KIDNEY FAILURE, UNSPECIFIED: ICD-10-CM

## 2022-12-14 LAB
ANION GAP SERPL CALC-SCNC: 12 MMOL/L — SIGNIFICANT CHANGE UP (ref 5–17)
APTT BLD: 30.6 SEC — SIGNIFICANT CHANGE UP (ref 27.5–35.5)
BUN SERPL-MCNC: 52 MG/DL — HIGH (ref 7–23)
CALCIUM SERPL-MCNC: 8.6 MG/DL — SIGNIFICANT CHANGE UP (ref 8.4–10.5)
CHLORIDE SERPL-SCNC: 105 MMOL/L — SIGNIFICANT CHANGE UP (ref 96–108)
CO2 SERPL-SCNC: 20 MMOL/L — LOW (ref 22–31)
CREAT SERPL-MCNC: 1.85 MG/DL — HIGH (ref 0.5–1.3)
EGFR: 40 ML/MIN/1.73M2 — LOW
GLUCOSE BLDC GLUCOMTR-MCNC: 104 MG/DL — HIGH (ref 70–99)
GLUCOSE BLDC GLUCOMTR-MCNC: 126 MG/DL — HIGH (ref 70–99)
GLUCOSE BLDC GLUCOMTR-MCNC: 127 MG/DL — HIGH (ref 70–99)
GLUCOSE BLDC GLUCOMTR-MCNC: 187 MG/DL — HIGH (ref 70–99)
GLUCOSE SERPL-MCNC: 102 MG/DL — HIGH (ref 70–99)
HCT VFR BLD CALC: 31.2 % — LOW (ref 39–50)
HGB BLD-MCNC: 9.8 G/DL — LOW (ref 13–17)
INR BLD: 1.21 RATIO — HIGH (ref 0.88–1.16)
MAGNESIUM SERPL-MCNC: 2.4 MG/DL — SIGNIFICANT CHANGE UP (ref 1.6–2.6)
MCHC RBC-ENTMCNC: 29.4 PG — SIGNIFICANT CHANGE UP (ref 27–34)
MCHC RBC-ENTMCNC: 31.4 GM/DL — LOW (ref 32–36)
MCV RBC AUTO: 93.7 FL — SIGNIFICANT CHANGE UP (ref 80–100)
NRBC # BLD: 0 /100 WBCS — SIGNIFICANT CHANGE UP (ref 0–0)
PHOSPHATE SERPL-MCNC: 4.3 MG/DL — SIGNIFICANT CHANGE UP (ref 2.5–4.5)
PLATELET # BLD AUTO: 242 K/UL — SIGNIFICANT CHANGE UP (ref 150–400)
POTASSIUM SERPL-MCNC: 4.8 MMOL/L — SIGNIFICANT CHANGE UP (ref 3.5–5.3)
POTASSIUM SERPL-SCNC: 4.8 MMOL/L — SIGNIFICANT CHANGE UP (ref 3.5–5.3)
PROTHROM AB SERPL-ACNC: 14 SEC — HIGH (ref 10.5–13.4)
RBC # BLD: 3.33 M/UL — LOW (ref 4.2–5.8)
RBC # FLD: 13.6 % — SIGNIFICANT CHANGE UP (ref 10.3–14.5)
SODIUM SERPL-SCNC: 137 MMOL/L — SIGNIFICANT CHANGE UP (ref 135–145)
WBC # BLD: 7.85 K/UL — SIGNIFICANT CHANGE UP (ref 3.8–10.5)
WBC # FLD AUTO: 7.85 K/UL — SIGNIFICANT CHANGE UP (ref 3.8–10.5)

## 2022-12-14 PROCEDURE — 99233 SBSQ HOSP IP/OBS HIGH 50: CPT | Mod: GC

## 2022-12-14 PROCEDURE — 99223 1ST HOSP IP/OBS HIGH 75: CPT | Mod: GC

## 2022-12-14 RX ADMIN — Medication 81 MILLIGRAM(S): at 12:21

## 2022-12-14 RX ADMIN — HEPARIN SODIUM 5000 UNIT(S): 5000 INJECTION INTRAVENOUS; SUBCUTANEOUS at 05:52

## 2022-12-14 RX ADMIN — CLOPIDOGREL BISULFATE 75 MILLIGRAM(S): 75 TABLET, FILM COATED ORAL at 12:21

## 2022-12-14 RX ADMIN — BUMETANIDE 2 MILLIGRAM(S): 0.25 INJECTION INTRAMUSCULAR; INTRAVENOUS at 17:46

## 2022-12-14 RX ADMIN — Medication 10 UNIT(S): at 12:23

## 2022-12-14 RX ADMIN — Medication 100 MILLIGRAM(S): at 05:51

## 2022-12-14 RX ADMIN — Medication 60 MILLIGRAM(S): at 05:51

## 2022-12-14 RX ADMIN — INSULIN GLARGINE 45 UNIT(S): 100 INJECTION, SOLUTION SUBCUTANEOUS at 21:51

## 2022-12-14 RX ADMIN — BUMETANIDE 2 MILLIGRAM(S): 0.25 INJECTION INTRAMUSCULAR; INTRAVENOUS at 05:50

## 2022-12-14 RX ADMIN — GABAPENTIN 100 MILLIGRAM(S): 400 CAPSULE ORAL at 12:21

## 2022-12-14 RX ADMIN — Medication 10 UNIT(S): at 08:11

## 2022-12-14 RX ADMIN — CARVEDILOL PHOSPHATE 25 MILLIGRAM(S): 80 CAPSULE, EXTENDED RELEASE ORAL at 17:49

## 2022-12-14 RX ADMIN — SPIRONOLACTONE 50 MILLIGRAM(S): 25 TABLET, FILM COATED ORAL at 05:52

## 2022-12-14 RX ADMIN — Medication 10 UNIT(S): at 17:47

## 2022-12-14 RX ADMIN — CLOTRIMAZOLE AND BETAMETHASONE DIPROPIONATE 1 APPLICATION(S): 10; .5 CREAM TOPICAL at 07:30

## 2022-12-14 RX ADMIN — Medication 650 MILLIGRAM(S): at 01:48

## 2022-12-14 RX ADMIN — Medication 100 MILLIGRAM(S): at 15:06

## 2022-12-14 RX ADMIN — CARVEDILOL PHOSPHATE 25 MILLIGRAM(S): 80 CAPSULE, EXTENDED RELEASE ORAL at 05:51

## 2022-12-14 RX ADMIN — HEPARIN SODIUM 5000 UNIT(S): 5000 INJECTION INTRAVENOUS; SUBCUTANEOUS at 15:06

## 2022-12-14 RX ADMIN — Medication 650 MILLIGRAM(S): at 00:48

## 2022-12-14 RX ADMIN — ATORVASTATIN CALCIUM 80 MILLIGRAM(S): 80 TABLET, FILM COATED ORAL at 21:51

## 2022-12-14 RX ADMIN — Medication 100 MILLIGRAM(S): at 21:51

## 2022-12-14 RX ADMIN — HEPARIN SODIUM 5000 UNIT(S): 5000 INJECTION INTRAVENOUS; SUBCUTANEOUS at 21:51

## 2022-12-14 NOTE — PROGRESS NOTE ADULT - SUBJECTIVE AND OBJECTIVE BOX
Overnight Events: None     Review Of Systems: No chest pain, shortness of breath, or palpitations            Current Meds:  acetaminophen     Tablet .. 650 milliGRAM(s) Oral every 6 hours PRN  albuterol/ipratropium for Nebulization 3 milliLiter(s) Nebulizer every 6 hours PRN  aluminum hydroxide/magnesium hydroxide/simethicone Suspension 30 milliLiter(s) Oral every 4 hours PRN  aspirin enteric coated 81 milliGRAM(s) Oral daily  atorvastatin 80 milliGRAM(s) Oral at bedtime  buMETAnide Injectable 2 milliGRAM(s) IV Push every 12 hours  carvedilol 25 milliGRAM(s) Oral every 12 hours  clopidogrel Tablet 75 milliGRAM(s) Oral daily  clotrimazole/betamethasone Cream 1 Application(s) Topical two times a day  dextrose 5%. 1000 milliLiter(s) IV Continuous <Continuous>  dextrose 5%. 1000 milliLiter(s) IV Continuous <Continuous>  dextrose 50% Injectable 25 Gram(s) IV Push once  dextrose 50% Injectable 12.5 Gram(s) IV Push once  dextrose 50% Injectable 25 Gram(s) IV Push once  dextrose Oral Gel 15 Gram(s) Oral once PRN  gabapentin 100 milliGRAM(s) Oral daily  glucagon  Injectable 1 milliGRAM(s) IntraMuscular once  heparin   Injectable 5000 Unit(s) SubCutaneous every 8 hours  hydrALAZINE 100 milliGRAM(s) Oral every 8 hours  insulin glargine Injectable (LANTUS) 45 Unit(s) SubCutaneous at bedtime  insulin lispro (ADMELOG) corrective regimen sliding scale   SubCutaneous at bedtime  insulin lispro (ADMELOG) corrective regimen sliding scale   SubCutaneous three times a day before meals  insulin lispro Injectable (ADMELOG) 10 Unit(s) SubCutaneous three times a day before meals  melatonin 3 milliGRAM(s) Oral at bedtime PRN  NIFEdipine XL 60 milliGRAM(s) Oral every 24 hours  ondansetron Injectable 4 milliGRAM(s) IV Push every 8 hours PRN  spironolactone 50 milliGRAM(s) Oral daily      Vitals:  T(F): 97.8 (12-14), Max: 98.5 (12-14)  HR: 58 (12-14) (58 - 63)  BP: 152/72 (12-14) (152/72 - 166/74)  RR: 18 (12-14)  SpO2: 97% (12-14)  I&O's Summary    13 Dec 2022 07:01  -  14 Dec 2022 07:00  --------------------------------------------------------  IN: 840 mL / OUT: 1700 mL / NET: -860 mL        Physical Exam:      Appearance: No acute distress; well appearing  Eyes: pink conjunctiva  HEENT: Normal oral mucosa  Cardiovascular: RRR, S1, S2, systolic murmur, JVP difficult to assess due to body habitus, 3+ LE edema with what appear to be chronic LE wounds   Respiratory: Clear to auscultation bilaterally  Gastrointestinal: soft, non-tender, non-distended   Musculoskeletal: No clubbing; no joint deformity   Neurologic: Normal speech, no facial asymmetry  Psychiatry: AAOx3, mood & affect appropriate  Skin: No rashes, ecchymoses, or cyanosis of exposed skin                           9.8    7.85  )-----------( 242      ( 14 Dec 2022 07:27 )             31.2     12-13    138  |  105  |  52<H>  ----------------------------<  153<H>  4.7   |  20<L>  |  1.80<H>    Ca    8.4      13 Dec 2022 06:07  Phos  3.9     12-13  Mg     2.5     12-13    TPro  6.8  /  Alb  3.3  /  TBili  0.5  /  DBili  x   /  AST  30  /  ALT  37  /  AlkPhos  105  12-13    PT/INR - ( 14 Dec 2022 07:27 )   PT: 14.0 sec;   INR: 1.21 ratio         PTT - ( 14 Dec 2022 07:27 )  PTT:30.6 sec  CARDIAC MARKERS ( 09 Dec 2022 19:44 )  57 ng/L / x     / x     / 662 U/L / x     / 9.8 ng/mL  CARDIAC MARKERS ( 09 Dec 2022 17:49 )  65 ng/L / x     / x     / x     / x     / x          Serum Pro-Brain Natriuretic Peptide: 1651 pg/mL (12-12 @ 06:14)  Serum Pro-Brain Natriuretic Peptide: 1816 pg/mL (12-09 @ 17:49)        Interpretation of Telemetry:  Sinus 55-60 with ventricular bigeminy

## 2022-12-14 NOTE — PROGRESS NOTE ADULT - PROBLEM SELECTOR PLAN 5
Continuing basal bolus insulin per prior admission levels.  - basal bolus: Lantus 45U QHS, Lispro 10U tid AC, low ISS ACHS per prior admission

## 2022-12-14 NOTE — CONSULT NOTE ADULT - ATTENDING COMMENTS
63 yo male with DM, HTN decompensated systolic chf  He appears to have CKD3B and his renal function may be close to baseline  Can send off alb:cr ratio, hep B and C and serum free light chains as screening  More likely cardiorenal component with venous congestion   Diurese  No objection to pursuing cardiac cath and can consider staged PCI if needed  Trend renal function  Dw medicine team    Colleen Knapp MD  Off: [phone]  contact me on teams    (After 5 pm or on weekends please page the on-call fellow/attending, can check [email or web address] for schedule. Login is vandana lowery, schedule under [facility] medicine, psych, derm) 63 yo male with DM, HTN decompensated chf  He appears to have CKD3B and his renal function may be close to baseline  Can send off alb:cr ratio, hep B and C and serum free light chains as screening  More likely cardiorenal component with venous congestion   Diurese  No objection to pursuing cardiac cath and can consider staged PCI if needed  Trend renal function  Dw medicine team    Colleen Knapp MD  Off: [phone]  contact me on teams    (After 5 pm or on weekends please page the on-call fellow/attending, can check [email or web address] for schedule. Login is vandana lowery, schedule under [facility] medicine, psych, derm)

## 2022-12-14 NOTE — PROGRESS NOTE ADULT - ATTENDING COMMENTS
62 year old man, DM, HTN transferred from [facility] last month SOB and chest discomfort, elevated troponin, ADHF and NSTEMI. Successful diuresis and symptoms improved. Had no active chest pain, optimized volume status and managed RADHA on CKD - baseline 1.31 5/24/21. Planned coronary angiography once optimized. Off diuretic creatinine continued to gradually improve and as has had no symptoms in prolonged period of time, seemed best to defer angiography until renal function fully back to baseline. Discharged to home, but returns volume overloaded, thus once again seeking to optimize. Still testing positive for COVID and seeking explanation and determination as to when can proceed with coronary angiography.    To contact call Cardiology Fellow or Attending as listed on [email or web address] password: [email or web address].

## 2022-12-14 NOTE — PROGRESS NOTE ADULT - ASSESSMENT
62M former smoker w/ HFpEF (TTE EF 57%, 11/14/22), HTN, HLD, IDDM, and recent NSTEMI, CKD III, who presented with worsening HF exacerbation in setting of not having diuretics at home.    # Acute decompensated heart failure  - Patient continues to exhibit hypervolemia on exam   - Currently on NC.  O2 as appropriate  - Continue IV Bumex 2mg BID.   - c/w spironolactone  - consider SGLT as outpatient.  - Strict I/O and daily STANDING weight.  - Check electrolytes. K> 4 and Mg >2  - monitor on telemetry  - troponin has downtrended. Patient reports no chest pain, unlikely had recurrent ischemic event. HF exacerbation likely due to lack of diuretics regimen at home. No need for repeat TTE.  - Once patient is more euvolemic with improved kidney function and off isolation with COVID, will discuss regarding ischemic evaluation.  Cardiac cath was delayed during last hospitalization given RADHA  - Per primary team, baseline Cr ~1.9 due to CKD stage III, please clarify from nephrology whether this is a new baseline   - Please notify cardiology when pt will be off isolation   - HTN management by primary team.      Bea Mitchell MD  Cardiology Fellow     Recommendations are preliminary until cosigned by attending

## 2022-12-14 NOTE — CONSULT NOTE ADULT - SUBJECTIVE AND OBJECTIVE BOX
[facility] DIVISION OF KIDNEY DISEASES AND HYPERTENSION -- [phone]  -- INITIAL CONSULT NOTE  --------------------------------------------------------------------------------  HPI: 62 year old female with PMH of HTN, HLD, DM, CHF admitted with worsening SOB and LE swelling. Pt noted to have worsening of SCr. Nephrology consulted for RADHA.         PAST HISTORY  --------------------------------------------------------------------------------  PAST MEDICAL & SURGICAL HISTORY:  Type 2 diabetes mellitus  HTN (hypertension)  HLD (hyperlipidemia  HTN (hypertensio  CAD (coronary artery disease)  DM (diabetes mellitus)  History of cholecystectomy  H/O amputation of lesser toe, right  small toe  S/P amputation of lesser toe, right      History of cholecystectomy        FAMILY HISTORY:    PAST SOCIAL HISTORY:    ALLERGIES & MEDICATIONS  --------------------------------------------------------------------------------  Allergies    No Known Allergies    Intolerances      Standing Inpatient Medications  aspirin enteric coated 81 milliGRAM(s) Oral daily  atorvastatin 80 milliGRAM(s) Oral at bedtime  buMETAnide Injectable 2 milliGRAM(s) IV Push every 12 hours  carvedilol 25 milliGRAM(s) Oral every 12 hours  clopidogrel Tablet 75 milliGRAM(s) Oral daily  clotrimazole/betamethasone Cream 1 Application(s) Topical two times a day  dextrose 5%. 1000 milliLiter(s) IV Continuous <Continuous>  dextrose 5%. 1000 milliLiter(s) IV Continuous <Continuous>  dextrose 50% Injectable 25 Gram(s) IV Push once  dextrose 50% Injectable 12.5 Gram(s) IV Push once  dextrose 50% Injectable 25 Gram(s) IV Push once  gabapentin 100 milliGRAM(s) Oral daily  glucagon  Injectable 1 milliGRAM(s) IntraMuscular once  heparin   Injectable 5000 Unit(s) SubCutaneous every 8 hours  hydrALAZINE 100 milliGRAM(s) Oral every 8 hours  insulin glargine Injectable (LANTUS) 45 Unit(s) SubCutaneous at bedtime  insulin lispro (ADMELOG) corrective regimen sliding scale   SubCutaneous at bedtime  insulin lispro (ADMELOG) corrective regimen sliding scale   SubCutaneous three times a day before meals  insulin lispro Injectable (ADMELOG) 10 Unit(s) SubCutaneous three times a day before meals  NIFEdipine XL 60 milliGRAM(s) Oral every 24 hours  spironolactone 50 milliGRAM(s) Oral daily    PRN Inpatient Medications  acetaminophen     Tablet .. 650 milliGRAM(s) Oral every 6 hours PRN  albuterol/ipratropium for Nebulization 3 milliLiter(s) Nebulizer every 6 hours PRN  aluminum hydroxide/magnesium hydroxide/simethicone Suspension 30 milliLiter(s) Oral every 4 hours PRN  dextrose Oral Gel 15 Gram(s) Oral once PRN  melatonin 3 milliGRAM(s) Oral at bedtime PRN  ondansetron Injectable 4 milliGRAM(s) IV Push every 8 hours PRN      REVIEW OF SYSTEMS  --------------------------------------------------------------------------------  Gen: No fevers/chills  Head/Eyes/Ears: No HA  Respiratory: SOB on exertion  CV: see HPI  GI: No abdominal pain, diarrhea  : No dysuria, hematuria  MSK: LE edema  Skin: No rashes  Heme: No easy bruising or bleeding    All other systems were reviewed and are negative, except as noted.    VITALS/PHYSICAL EXAM  --------------------------------------------------------------------------------  T(C): 36.9 (12-14-22 @ 12:10), Max: 36.9 (12-14-22 @ 00:29)  HR: 59 (12-14-22 @ 12:10) (58 - 62)  BP: 149/66 (12-14-22 @ 12:10) (149/66 - 160/66)  RR: 18 (12-14-22 @ 12:10) (18 - 22)  SpO2: 92% (12-14-22 @ 12:10) (92% - 97%)  Wt(kg): --    12-13-22 @ 07:01  -  12-14-22 @ 07:00  --------------------------------------------------------  IN: 840 mL / OUT: 1700 mL / NET: -860 mL    12-14-22 @ 07:01  -  12-14-22 @ 15:53  --------------------------------------------------------  IN: 780 mL / OUT: 1000 mL / NET: -220 mL      Physical Exam:  	Gen: NAD  	HEENT: Anicteric  	Pulm: fair entry B/L  	CV: S1S2+  	Abd: Soft, +BS, distended         	Ext: LE edema B/L wrapped in dressing+  	Neuro: Awake          	Skin: Warm and dry      LABS/STUDIES  --------------------------------------------------------------------------------              9.8    7.85  >-----------<  242      [12-14-22 @ 07:27]              31.2     137  |  105  |  52  ----------------------------<  102      [12-14-22 @ 07:27]  4.8   |  20  |  1.85        Ca     8.6     [12-14-22 @ 07:27]      Mg     2.4     [12-14-22 @ 07:27]      Phos  4.3     [12-14-22 @ 07:27]    Creatinine Trend:  SCr 1.85 [12-14 @ 07:27]  SCr 1.80 [12-13 @ 06:07]  SCr 1.91 [12-12 @ 06:14]  SCr 1.74 [12-11 @ 06:14]  SCr 1.68 [12-10 @ 12:44]    Urinalysis - [12-09-22 @ 20:35]      Color Light Yellow / Appearance Clear / SG 1.017 / pH 5.5      Gluc Negative / Ketone Negative  / Bili Negative / Urobili Negative       Blood Negative / Protein 100 mg/dL / Leuk Est Negative / Nitrite Negative      RBC 3 / WBC 2 / Hyaline 3 / Gran  / Sq Epi  / Non Sq Epi 1 / Bacteria Negative      HCV 0.18, Nonreact      [12-10-22 @ 06:55]   [facility] DIVISION OF KIDNEY DISEASES AND HYPERTENSION -- [phone]  -- INITIAL CONSULT NOTE  --------------------------------------------------------------------------------  HPI: 62 year old female with PMH of HTN, HLD, DM, CHF admitted with worsening SOB and LE swelling. Pt noted to have worsening of SCr. Nephrology consulted for RADHA.     Pt seen and examined today. Pt with of hx of CHF. Upon review of labs, Scr was elevated at 1.96 on 11/11/22/. No prior labs available to review. Pt says he has not seen any nephrologist in the past. Pt was admitted in November 2022 with similar complaints. Scr was elevated at 2.06 on 11/17/22.  UA done on 12/9/22 showed proteinuria. Pt currently receiving Bumex for fluid overload.    Pt complaining of persistent LE swelling. Denies fever, chills, nausea, vomiting, abdominal pain, HA during rounds. Pt is nonoliguric.    PAST HISTORY  --------------------------------------------------------------------------------  PAST MEDICAL & SURGICAL HISTORY:  Type 2 diabetes mellitus  HTN (hypertension)  HLD (hyperlipidemia  HTN (hypertensio  CAD (coronary artery disease)  DM (diabetes mellitus)  History of cholecystectomy  H/O amputation of lesser toe, right  small toe  S/P amputation of lesser toe, right      History of cholecystectomy      FAMILY HISTORY:    PAST SOCIAL HISTORY:    ALLERGIES & MEDICATIONS  --------------------------------------------------------------------------------  Allergies    No Known Allergies    Intolerances      Standing Inpatient Medications  aspirin enteric coated 81 milliGRAM(s) Oral daily  atorvastatin 80 milliGRAM(s) Oral at bedtime  buMETAnide Injectable 2 milliGRAM(s) IV Push every 12 hours  carvedilol 25 milliGRAM(s) Oral every 12 hours  clopidogrel Tablet 75 milliGRAM(s) Oral daily  clotrimazole/betamethasone Cream 1 Application(s) Topical two times a day  dextrose 5%. 1000 milliLiter(s) IV Continuous <Continuous>  dextrose 5%. 1000 milliLiter(s) IV Continuous <Continuous>  dextrose 50% Injectable 25 Gram(s) IV Push once  dextrose 50% Injectable 12.5 Gram(s) IV Push once  dextrose 50% Injectable 25 Gram(s) IV Push once  gabapentin 100 milliGRAM(s) Oral daily  glucagon  Injectable 1 milliGRAM(s) IntraMuscular once  heparin   Injectable 5000 Unit(s) SubCutaneous every 8 hours  hydrALAZINE 100 milliGRAM(s) Oral every 8 hours  insulin glargine Injectable (LANTUS) 45 Unit(s) SubCutaneous at bedtime  insulin lispro (ADMELOG) corrective regimen sliding scale   SubCutaneous at bedtime  insulin lispro (ADMELOG) corrective regimen sliding scale   SubCutaneous three times a day before meals  insulin lispro Injectable (ADMELOG) 10 Unit(s) SubCutaneous three times a day before meals  NIFEdipine XL 60 milliGRAM(s) Oral every 24 hours  spironolactone 50 milliGRAM(s) Oral daily    PRN Inpatient Medications  acetaminophen     Tablet .. 650 milliGRAM(s) Oral every 6 hours PRN  albuterol/ipratropium for Nebulization 3 milliLiter(s) Nebulizer every 6 hours PRN  aluminum hydroxide/magnesium hydroxide/simethicone Suspension 30 milliLiter(s) Oral every 4 hours PRN  dextrose Oral Gel 15 Gram(s) Oral once PRN  melatonin 3 milliGRAM(s) Oral at bedtime PRN  ondansetron Injectable 4 milliGRAM(s) IV Push every 8 hours PRN      REVIEW OF SYSTEMS  --------------------------------------------------------------------------------  Gen: No fevers/chills  Head/Eyes/Ears: No HA  Respiratory: SOB on exertion  CV: see HPI  GI: No abdominal pain, diarrhea  : No dysuria, hematuria  MSK: LE edema  Skin: No rashes  Heme: No easy bruising or bleeding    All other systems were reviewed and are negative, except as noted.    VITALS/PHYSICAL EXAM  --------------------------------------------------------------------------------  T(C): 36.9 (12-14-22 @ 12:10), Max: 36.9 (12-14-22 @ 00:29)  HR: 59 (12-14-22 @ 12:10) (58 - 62)  BP: 149/66 (12-14-22 @ 12:10) (149/66 - 160/66)  RR: 18 (12-14-22 @ 12:10) (18 - 22)  SpO2: 92% (12-14-22 @ 12:10) (92% - 97%)  Wt(kg): --    12-13-22 @ 07:01  -  12-14-22 @ 07:00  --------------------------------------------------------  IN: 840 mL / OUT: 1700 mL / NET: -860 mL    12-14-22 @ 07:01  -  12-14-22 @ 15:53  --------------------------------------------------------  IN: 780 mL / OUT: 1000 mL / NET: -220 mL      Physical Exam:  	Gen: NAD  	HEENT: Anicteric  	Pulm: fair entry B/L  	CV: S1S2+  	Abd: Soft, +BS, distended         	Ext: LE edema B/L wrapped in dressing+  	Neuro: Awake          	Skin: Warm and dry      LABS/STUDIES  --------------------------------------------------------------------------------              9.8    7.85  >-----------<  242      [12-14-22 @ 07:27]              31.2     137  |  105  |  52  ----------------------------<  102      [12-14-22 @ 07:27]  4.8   |  20  |  1.85        Ca     8.6     [12-14-22 @ 07:27]      Mg     2.4     [12-14-22 @ 07:27]      Phos  4.3     [12-14-22 @ 07:27]    Creatinine Trend:  SCr 1.85 [12-14 @ 07:27]  SCr 1.80 [12-13 @ 06:07]  SCr 1.91 [12-12 @ 06:14]  SCr 1.74 [12-11 @ 06:14]  SCr 1.68 [12-10 @ 12:44]    Urinalysis - [12-09-22 @ 20:35]      Color Light Yellow / Appearance Clear / SG 1.017 / pH 5.5      Gluc Negative / Ketone Negative  / Bili Negative / Urobili Negative       Blood Negative / Protein 100 mg/dL / Leuk Est Negative / Nitrite Negative      RBC 3 / WBC 2 / Hyaline 3 / Gran  / Sq Epi  / Non Sq Epi 1 / Bacteria Negative      HCV 0.18, Nonreact      [12-10-22 @ 06:55]

## 2022-12-14 NOTE — CONSULT NOTE ADULT - ASSESSMENT
62M former smoker w/ HFpEF (TTE 11/14/22 EF 57%), h/o NSTEMI, HTN, HLD, T2DM on insulin, p/w worsening SOB, leg/scrotal edema, c/f ADHF, found covid-19 positive.    Wound Consult requested to assist w/ management of BLE PVD w/ stasis dermatitis  BLE (fungal) cellulitis     BLE - CHG4% wash w/ Adaptic + ABD pads QD  Consider MAR/PVR, XRay  BLE elevation & Compression  Abx per Medicine- Consider antifungal  Moisturize intact skin w/ SWEEN cream BID  Nutrition Consult for optimization        encourage high quality protein, MVI & Vit C to promote wound healing  Hyperglycemia -  ADA diet and Lantus, FS w/ ISS  Continue turning and positioning w/ offloading assistive devices as per protocol  Buttocks/ Sacrum Rodriguez BID and prn soiling        Continue w/ attends under pads and Pericare as per protocol  Waffle Cushion to chair when oob to chair  Continue w/ low air loss pressure redistribution bed surface   Care as per medicine, will follow w/ you  Upon discharge f/u as outpatient at [facility] [address] [place], [place] [phone]  Seen w/ RN and D/w team & attng  Thank you for this consult  Michelle Bruno PA-C [id number]  I spent 50minutes face to face w/ this pt of which more than 50% of the time was spent counseling & coordinating care of this pt. 
62M w/ former smoker w/ HFpEF (TTE EF 57%, 11/14/22), HTN, HLD, IDDM, and recent NSTEMI, who presented with worsening HF exacerbation in setting of not having diuretics at home.    # Acute decompensated heart failure  - Patient with severely volume overloaded likely in setting of not having his diuretics at home.  - Currently on NC. supp O2 as appropriate  - He seems to respond to IV bumex 2mg BID. continue current diuretics regimen.  - c/w spironolactone  - consider SGLT as outpatient.  - Strict I/O and daily STANDING weight.  - Check electrolytes. K> 4 and Mg >2  - monitor on telemetry  - Cr downtrending and LFT flat.  - troponin stays negative. patient reports no chest pain. unlikely had recurrent ischemic event. HF exacerbation likely due to lack of diuretics regimen at home. No need for repeat TTE.  - Once patient is more euvolemic and off isolation with COVID, will discuss regarding ischemic evaluation.   - HTN management by primary team.  
Pt with kidney failure and volume overload

## 2022-12-14 NOTE — PROGRESS NOTE ADULT - SUBJECTIVE AND OBJECTIVE BOX
Candida Castro MD  Internal Medicine, PGY-1        Patient Name: MARY ROBIN  Patient MRN: [id number]    Patient is a 62y old  Male who presents with a chief complaint of HF exacerbation (13 Dec 2022 08:03)      **SUBJECTIVE**    Last 24 hours: Patient endorsing scrotal pain overnight. Given tylenol with improvement. This AM_____     Review of Systems  Constitutional: No weakness, fevers, or chills  HEENT: No headache, visual changes, lightheadedness, or sore throat  Respiratory: No shortness of breath, cough, wheezing, or hemoptysis  Cardiovascular: No chest pain or palpitations  GI: No abdominal or epigastric pain. No nausea, vomiting, or change in bowel habits. No hematemesis or hematochezia  Neuro: No numbness or weakness  Skin: No itching or rashes      **OBJECTIVE**        VITALS:   T(C): 36.6 (12-14-22 @ 05:00), Max: 36.9 (12-14-22 @ 00:29)  HR: 58 (12-14-22 @ 05:00) (58 - 63)  BP: 152/72 (12-14-22 @ 05:00) (152/72 - 166/74)  RR: 18 (12-14-22 @ 05:00) (17 - 22)  SpO2: 97% (12-14-22 @ 05:00) (93% - 97%)    GENERAL: NAD, lying in bed comfortably  HEAD:  Normocephalic  EYES: Sclera clear  ENT: Moist mucous membranes  NECK: Supple, No JVD  CHEST/LUNG: Minimal crackles on auscultation; No rales, rhonchi, wheezing, or rubs. Unlabored respirations  HEART: Regular rate and rhythm; No murmurs, rubs, or gallops  ABDOMEN: BSx4; Soft, nontender, nondistended  EXTREMITIES:  2+ Peripheral Pulses, brisk capillary refill. diffuse edema of b/l LE, but improved since admission  NERVOUS SYSTEM:  A&Ox3, no focal deficits   SKIN: Chronic venous stasis on b/l LE     Labs  12-13    138  |  105  |  52<H>  ----------------------------<  153<H>  4.7   |  20<L>  |  1.80<H>    Ca    8.4      13 Dec 2022 06:07  Phos  3.9     12-13  Mg     2.5     12-13    TPro  6.8  /  Alb  3.3  /  TBili  0.5  /  DBili  x   /  AST  30  /  ALT  37  /  AlkPhos  105  12-13    CBC Full  -  ( 13 Dec 2022 06:07 )  WBC Count : 8.17 K/uL  RBC Count : 3.22 M/uL  Hemoglobin : 9.4 g/dL  Hematocrit : 30.4 %  Platelet Count - Automated : 230 K/uL  Mean Cell Volume : 94.4 fl  Mean Cell Hemoglobin : 29.2 pg  Mean Cell Hemoglobin Concentration : 30.9 gm/dL  Auto Neutrophil # : x  Auto Lymphocyte # : x  Auto Monocyte # : x  Auto Eosinophil # : x  Auto Basophil # : x  Auto Neutrophil % : x  Auto Lymphocyte % : x  Auto Monocyte % : x  Auto Eosinophil % : x  Auto Basophil % : x              POC Glucose  CAPILLARY BLOOD GLUCOSE      POCT Blood Glucose.: 182 mg/dL (13 Dec 2022 20:18)  POCT Blood Glucose.: 116 mg/dL (13 Dec 2022 17:53)  POCT Blood Glucose.: 81 mg/dL (13 Dec 2022 17:34)  POCT Blood Glucose.: 67 mg/dL (13 Dec 2022 17:10)  POCT Blood Glucose.: 68 mg/dL (13 Dec 2022 17:09)  POCT Blood Glucose.: 102 mg/dL (13 Dec 2022 12:13)  POCT Blood Glucose.: 110 mg/dL (13 Dec 2022 07:59)      I&O's  I&O's Summary    13 Dec 2022 07:01  -  14 Dec 2022 07:00  --------------------------------------------------------  IN: 840 mL / OUT: 1700 mL / NET: -860 mL        UA (if applicable)      Micro (if applicable)      Imaging (if applicable)    Active Medications  MEDICATIONS  (STANDING):  aspirin enteric coated 81 milliGRAM(s) Oral daily  atorvastatin 80 milliGRAM(s) Oral at bedtime  buMETAnide Injectable 2 milliGRAM(s) IV Push every 12 hours  carvedilol 25 milliGRAM(s) Oral every 12 hours  clopidogrel Tablet 75 milliGRAM(s) Oral daily  clotrimazole/betamethasone Cream 1 Application(s) Topical two times a day  dextrose 5%. 1000 milliLiter(s) (50 mL/Hr) IV Continuous <Continuous>  dextrose 5%. 1000 milliLiter(s) (100 mL/Hr) IV Continuous <Continuous>  dextrose 50% Injectable 25 Gram(s) IV Push once  dextrose 50% Injectable 12.5 Gram(s) IV Push once  dextrose 50% Injectable 25 Gram(s) IV Push once  gabapentin 100 milliGRAM(s) Oral daily  glucagon  Injectable 1 milliGRAM(s) IntraMuscular once  heparin   Injectable 5000 Unit(s) SubCutaneous every 8 hours  hydrALAZINE 100 milliGRAM(s) Oral every 8 hours  insulin glargine Injectable (LANTUS) 45 Unit(s) SubCutaneous at bedtime  insulin lispro (ADMELOG) corrective regimen sliding scale   SubCutaneous at bedtime  insulin lispro (ADMELOG) corrective regimen sliding scale   SubCutaneous three times a day before meals  insulin lispro Injectable (ADMELOG) 10 Unit(s) SubCutaneous three times a day before meals  NIFEdipine XL 60 milliGRAM(s) Oral every 24 hours  spironolactone 50 milliGRAM(s) Oral daily    MEDICATIONS  (PRN):  acetaminophen     Tablet .. 650 milliGRAM(s) Oral every 6 hours PRN Temp greater or equal to 38C (100.4F), Mild Pain (1 - 3)  albuterol/ipratropium for Nebulization 3 milliLiter(s) Nebulizer every 6 hours PRN Shortness of Breath and/or Wheezing  aluminum hydroxide/magnesium hydroxide/simethicone Suspension 30 milliLiter(s) Oral every 4 hours PRN Dyspepsia  dextrose Oral Gel 15 Gram(s) Oral once PRN Blood Glucose LESS THAN 70 milliGRAM(s)/deciliter  melatonin 3 milliGRAM(s) Oral at bedtime PRN Insomnia  ondansetron Injectable 4 milliGRAM(s) IV Push every 8 hours PRN Nausea and/or Vomiting         Candida Castro MD  Internal Medicine, PGY-1        Patient Name: MARY ROBIN  Patient MRN: [id number]    Patient is a 62y old  Male who presents with a chief complaint of HF exacerbation (13 Dec 2022 08:03)      **SUBJECTIVE**    Last 24 hours: Patient endorsing scrotal pain overnight. Given tylenol with improvement. This AM patient states he is still bothered by the scrotal swelling but the pain was decreased from the tylenol. States he continues to feel well other than the swelling.     Review of Systems  Constitutional: No weakness, fevers, or chills  HEENT: No headache, visual changes, lightheadedness, or sore throat  Respiratory: No shortness of breath, cough, wheezing, or hemoptysis  Cardiovascular: No chest pain or palpitations  GI: No abdominal or epigastric pain. No nausea, vomiting, or change in bowel habits. No hematemesis or hematochezia  Neuro: No numbness or weakness  Skin: No itching or rashes      **OBJECTIVE**        VITALS:   T(C): 36.6 (12-14-22 @ 05:00), Max: 36.9 (12-14-22 @ 00:29)  HR: 58 (12-14-22 @ 05:00) (58 - 63)  BP: 152/72 (12-14-22 @ 05:00) (152/72 - 166/74)  RR: 18 (12-14-22 @ 05:00) (17 - 22)  SpO2: 97% (12-14-22 @ 05:00) (93% - 97%)    GENERAL: NAD, lying in bed comfortably  HEAD:  Normocephalic  EYES: Sclera clear  ENT: Moist mucous membranes  NECK: Supple, No JVD  CHEST/LUNG: Minimal crackles on auscultation; No rales, rhonchi, wheezing, or rubs. Unlabored respirations  HEART: Regular rate and rhythm; No murmurs, rubs, or gallops  ABDOMEN: BSx4; Soft, nontender, nondistended  EXTREMITIES:  2+ Peripheral Pulses, brisk capillary refill. diffuse edema of b/l LE, but improved since admission  NERVOUS SYSTEM:  A&Ox3, no focal deficits   SKIN: Chronic venous stasis on b/l LE     Labs  12-13    138  |  105  |  52<H>  ----------------------------<  153<H>  4.7   |  20<L>  |  1.80<H>    Ca    8.4      13 Dec 2022 06:07  Phos  3.9     12-13  Mg     2.5     12-13    TPro  6.8  /  Alb  3.3  /  TBili  0.5  /  DBili  x   /  AST  30  /  ALT  37  /  AlkPhos  105  12-13    CBC Full  -  ( 13 Dec 2022 06:07 )  WBC Count : 8.17 K/uL  RBC Count : 3.22 M/uL  Hemoglobin : 9.4 g/dL  Hematocrit : 30.4 %  Platelet Count - Automated : 230 K/uL  Mean Cell Volume : 94.4 fl  Mean Cell Hemoglobin : 29.2 pg  Mean Cell Hemoglobin Concentration : 30.9 gm/dL  Auto Neutrophil # : x  Auto Lymphocyte # : x  Auto Monocyte # : x  Auto Eosinophil # : x  Auto Basophil # : x  Auto Neutrophil % : x  Auto Lymphocyte % : x  Auto Monocyte % : x  Auto Eosinophil % : x  Auto Basophil % : x              POC Glucose  CAPILLARY BLOOD GLUCOSE      POCT Blood Glucose.: 182 mg/dL (13 Dec 2022 20:18)  POCT Blood Glucose.: 116 mg/dL (13 Dec 2022 17:53)  POCT Blood Glucose.: 81 mg/dL (13 Dec 2022 17:34)  POCT Blood Glucose.: 67 mg/dL (13 Dec 2022 17:10)  POCT Blood Glucose.: 68 mg/dL (13 Dec 2022 17:09)  POCT Blood Glucose.: 102 mg/dL (13 Dec 2022 12:13)  POCT Blood Glucose.: 110 mg/dL (13 Dec 2022 07:59)      I&O's  I&O's Summary    13 Dec 2022 07:01  -  14 Dec 2022 07:00  --------------------------------------------------------  IN: 840 mL / OUT: 1700 mL / NET: -860 mL        UA (if applicable)      Micro (if applicable)      Imaging (if applicable)    Active Medications  MEDICATIONS  (STANDING):  aspirin enteric coated 81 milliGRAM(s) Oral daily  atorvastatin 80 milliGRAM(s) Oral at bedtime  buMETAnide Injectable 2 milliGRAM(s) IV Push every 12 hours  carvedilol 25 milliGRAM(s) Oral every 12 hours  clopidogrel Tablet 75 milliGRAM(s) Oral daily  clotrimazole/betamethasone Cream 1 Application(s) Topical two times a day  dextrose 5%. 1000 milliLiter(s) (50 mL/Hr) IV Continuous <Continuous>  dextrose 5%. 1000 milliLiter(s) (100 mL/Hr) IV Continuous <Continuous>  dextrose 50% Injectable 25 Gram(s) IV Push once  dextrose 50% Injectable 12.5 Gram(s) IV Push once  dextrose 50% Injectable 25 Gram(s) IV Push once  gabapentin 100 milliGRAM(s) Oral daily  glucagon  Injectable 1 milliGRAM(s) IntraMuscular once  heparin   Injectable 5000 Unit(s) SubCutaneous every 8 hours  hydrALAZINE 100 milliGRAM(s) Oral every 8 hours  insulin glargine Injectable (LANTUS) 45 Unit(s) SubCutaneous at bedtime  insulin lispro (ADMELOG) corrective regimen sliding scale   SubCutaneous at bedtime  insulin lispro (ADMELOG) corrective regimen sliding scale   SubCutaneous three times a day before meals  insulin lispro Injectable (ADMELOG) 10 Unit(s) SubCutaneous three times a day before meals  NIFEdipine XL 60 milliGRAM(s) Oral every 24 hours  spironolactone 50 milliGRAM(s) Oral daily    MEDICATIONS  (PRN):  acetaminophen     Tablet .. 650 milliGRAM(s) Oral every 6 hours PRN Temp greater or equal to 38C (100.4F), Mild Pain (1 - 3)  albuterol/ipratropium for Nebulization 3 milliLiter(s) Nebulizer every 6 hours PRN Shortness of Breath and/or Wheezing  aluminum hydroxide/magnesium hydroxide/simethicone Suspension 30 milliLiter(s) Oral every 4 hours PRN Dyspepsia  dextrose Oral Gel 15 Gram(s) Oral once PRN Blood Glucose LESS THAN 70 milliGRAM(s)/deciliter  melatonin 3 milliGRAM(s) Oral at bedtime PRN Insomnia  ondansetron Injectable 4 milliGRAM(s) IV Push every 8 hours PRN Nausea and/or Vomiting

## 2022-12-14 NOTE — CONSULT NOTE ADULT - PROBLEM SELECTOR RECOMMENDATION 9
Pt with RADHA in setting of MI and CHF. Pt gives no prior hx of kidney problems. Pt tested positive for COVID 19 on admission. Pt was admitted with significant volume overload on admission. Scr was elevated at 1.96 on 11/11/22 and  was at 2.06 on 11/17/22. SCr was elevated at 1.68 on admission (12/10/22). UA showed proteinuria. US kidney/bladder done on 11/11/22 showed no hydronephrosis. Pt likely with CKD, exact etiology of CKD remains unclear. Pt currently planned for PCI. Davis risk score: 26.1% post-PCI ANJUM. Recommend check urine spot TP/Cr, Hep BsAg and Hep C Ab, serum free light chain ratio and SIFE. Continue with diuretics Bumex 2 mg IV q12 for fluid overload. Consider holding diuretics on day of PCI. Avoid nephrotoxins. Daily weight. Dose meds as per egfr. Bladder scan prn.

## 2022-12-14 NOTE — PROGRESS NOTE ADULT - PROBLEM SELECTOR PLAN 1
NSTEMI last month w/ LHC deferred d/t poor renal function. Now p/w ADHF. Currently following w/ Dr. Shaik Thomson  - c/w diuresis w/ IV bumex 2 bid, goal 2-2.5L per day  - strict I/O, 1L fluid restriction, daily weights  - eventual nuclear stress test or LHC, hopefully during this admission pending cards  - appreciate cards recs daily

## 2022-12-15 LAB
ALBUMIN SERPL ELPH-MCNC: 3.6 G/DL — SIGNIFICANT CHANGE UP (ref 3.3–5)
ALP SERPL-CCNC: 112 U/L — SIGNIFICANT CHANGE UP (ref 40–120)
ALT FLD-CCNC: 39 U/L — SIGNIFICANT CHANGE UP (ref 10–45)
ANION GAP SERPL CALC-SCNC: 11 MMOL/L — SIGNIFICANT CHANGE UP (ref 5–17)
APTT BLD: 30.9 SEC — SIGNIFICANT CHANGE UP (ref 27.5–35.5)
AST SERPL-CCNC: 32 U/L — SIGNIFICANT CHANGE UP (ref 10–40)
BILIRUB SERPL-MCNC: 0.5 MG/DL — SIGNIFICANT CHANGE UP (ref 0.2–1.2)
BUN SERPL-MCNC: 59 MG/DL — HIGH (ref 7–23)
CALCIUM SERPL-MCNC: 8.9 MG/DL — SIGNIFICANT CHANGE UP (ref 8.4–10.5)
CHLORIDE SERPL-SCNC: 104 MMOL/L — SIGNIFICANT CHANGE UP (ref 96–108)
CO2 SERPL-SCNC: 23 MMOL/L — SIGNIFICANT CHANGE UP (ref 22–31)
CREAT ?TM UR-MCNC: 69 MG/DL — SIGNIFICANT CHANGE UP
CREAT SERPL-MCNC: 1.83 MG/DL — HIGH (ref 0.5–1.3)
EGFR: 41 ML/MIN/1.73M2 — LOW
GLUCOSE BLDC GLUCOMTR-MCNC: 100 MG/DL — HIGH (ref 70–99)
GLUCOSE BLDC GLUCOMTR-MCNC: 120 MG/DL — HIGH (ref 70–99)
GLUCOSE BLDC GLUCOMTR-MCNC: 138 MG/DL — HIGH (ref 70–99)
GLUCOSE BLDC GLUCOMTR-MCNC: 216 MG/DL — HIGH (ref 70–99)
GLUCOSE BLDC GLUCOMTR-MCNC: 90 MG/DL — SIGNIFICANT CHANGE UP (ref 70–99)
GLUCOSE SERPL-MCNC: 155 MG/DL — HIGH (ref 70–99)
HCT VFR BLD CALC: 30.5 % — LOW (ref 39–50)
HGB BLD-MCNC: 9.5 G/DL — LOW (ref 13–17)
INR BLD: 1.26 RATIO — HIGH (ref 0.88–1.16)
MAGNESIUM SERPL-MCNC: 2.4 MG/DL — SIGNIFICANT CHANGE UP (ref 1.6–2.6)
MCHC RBC-ENTMCNC: 29.3 PG — SIGNIFICANT CHANGE UP (ref 27–34)
MCHC RBC-ENTMCNC: 31.1 GM/DL — LOW (ref 32–36)
MCV RBC AUTO: 94.1 FL — SIGNIFICANT CHANGE UP (ref 80–100)
NRBC # BLD: 0 /100 WBCS — SIGNIFICANT CHANGE UP (ref 0–0)
OSMOLALITY UR: 378 MOS/KG — SIGNIFICANT CHANGE UP (ref 300–900)
PHOSPHATE SERPL-MCNC: 4.6 MG/DL — HIGH (ref 2.5–4.5)
PLATELET # BLD AUTO: 248 K/UL — SIGNIFICANT CHANGE UP (ref 150–400)
POTASSIUM SERPL-MCNC: 4.7 MMOL/L — SIGNIFICANT CHANGE UP (ref 3.5–5.3)
POTASSIUM SERPL-SCNC: 4.7 MMOL/L — SIGNIFICANT CHANGE UP (ref 3.5–5.3)
POTASSIUM UR-SCNC: 25 MMOL/L — SIGNIFICANT CHANGE UP
PROT ?TM UR-MCNC: 68 MG/DL — HIGH (ref 0–12)
PROT SERPL-MCNC: 7.1 G/DL — SIGNIFICANT CHANGE UP (ref 6–8.3)
PROT/CREAT UR-RTO: 1 RATIO — HIGH (ref 0–0.2)
PROTHROM AB SERPL-ACNC: 14.6 SEC — HIGH (ref 10.5–13.4)
RBC # BLD: 3.24 M/UL — LOW (ref 4.2–5.8)
RBC # FLD: 13.7 % — SIGNIFICANT CHANGE UP (ref 10.3–14.5)
SODIUM SERPL-SCNC: 138 MMOL/L — SIGNIFICANT CHANGE UP (ref 135–145)
SODIUM UR-SCNC: 48 MMOL/L — SIGNIFICANT CHANGE UP
WBC # BLD: 8.45 K/UL — SIGNIFICANT CHANGE UP (ref 3.8–10.5)
WBC # FLD AUTO: 8.45 K/UL — SIGNIFICANT CHANGE UP (ref 3.8–10.5)

## 2022-12-15 PROCEDURE — 99233 SBSQ HOSP IP/OBS HIGH 50: CPT | Mod: GC

## 2022-12-15 RX ORDER — SODIUM CHLORIDE 0.65 %
1 AEROSOL, SPRAY (ML) NASAL EVERY 6 HOURS
Refills: 0 | Status: DISCONTINUED | OUTPATIENT
Start: 2022-12-15 | End: 2022-12-23

## 2022-12-15 RX ORDER — INSULIN LISPRO 100/ML
5 VIAL (ML) SUBCUTANEOUS ONCE
Refills: 0 | Status: COMPLETED | OUTPATIENT
Start: 2022-12-15 | End: 2022-12-15

## 2022-12-15 RX ADMIN — CLOTRIMAZOLE AND BETAMETHASONE DIPROPIONATE 1 APPLICATION(S): 10; .5 CREAM TOPICAL at 07:24

## 2022-12-15 RX ADMIN — Medication 81 MILLIGRAM(S): at 12:23

## 2022-12-15 RX ADMIN — BUMETANIDE 2 MILLIGRAM(S): 0.25 INJECTION INTRAMUSCULAR; INTRAVENOUS at 17:30

## 2022-12-15 RX ADMIN — Medication 10 UNIT(S): at 12:24

## 2022-12-15 RX ADMIN — Medication 100 MILLIGRAM(S): at 06:11

## 2022-12-15 RX ADMIN — Medication 100 MILLIGRAM(S): at 22:05

## 2022-12-15 RX ADMIN — CLOPIDOGREL BISULFATE 75 MILLIGRAM(S): 75 TABLET, FILM COATED ORAL at 12:24

## 2022-12-15 RX ADMIN — GABAPENTIN 100 MILLIGRAM(S): 400 CAPSULE ORAL at 12:24

## 2022-12-15 RX ADMIN — CARVEDILOL PHOSPHATE 25 MILLIGRAM(S): 80 CAPSULE, EXTENDED RELEASE ORAL at 17:28

## 2022-12-15 RX ADMIN — Medication 100 MILLIGRAM(S): at 13:45

## 2022-12-15 RX ADMIN — BUMETANIDE 2 MILLIGRAM(S): 0.25 INJECTION INTRAMUSCULAR; INTRAVENOUS at 06:08

## 2022-12-15 RX ADMIN — HEPARIN SODIUM 5000 UNIT(S): 5000 INJECTION INTRAVENOUS; SUBCUTANEOUS at 22:05

## 2022-12-15 RX ADMIN — SPIRONOLACTONE 50 MILLIGRAM(S): 25 TABLET, FILM COATED ORAL at 06:12

## 2022-12-15 RX ADMIN — Medication 650 MILLIGRAM(S): at 07:13

## 2022-12-15 RX ADMIN — Medication 60 MILLIGRAM(S): at 06:12

## 2022-12-15 RX ADMIN — ATORVASTATIN CALCIUM 80 MILLIGRAM(S): 80 TABLET, FILM COATED ORAL at 22:05

## 2022-12-15 RX ADMIN — CLOTRIMAZOLE AND BETAMETHASONE DIPROPIONATE 1 APPLICATION(S): 10; .5 CREAM TOPICAL at 20:43

## 2022-12-15 RX ADMIN — HEPARIN SODIUM 5000 UNIT(S): 5000 INJECTION INTRAVENOUS; SUBCUTANEOUS at 06:08

## 2022-12-15 RX ADMIN — INSULIN GLARGINE 45 UNIT(S): 100 INJECTION, SOLUTION SUBCUTANEOUS at 22:04

## 2022-12-15 RX ADMIN — Medication 650 MILLIGRAM(S): at 06:13

## 2022-12-15 RX ADMIN — Medication 10 UNIT(S): at 08:56

## 2022-12-15 RX ADMIN — Medication 5 UNIT(S): at 19:02

## 2022-12-15 RX ADMIN — HEPARIN SODIUM 5000 UNIT(S): 5000 INJECTION INTRAVENOUS; SUBCUTANEOUS at 13:45

## 2022-12-15 NOTE — PROGRESS NOTE ADULT - ATTENDING COMMENTS
62 year old man, DM, HTN transferred from [facility] last month SOB and chest discomfort, elevated troponin, ADHF and NSTEMI. Successful diuresis and symptoms improved. Had no active chest pain, optimized volume status and managed RADHA on CKD - baseline 1.31 5/24/21. Planned coronary angiography once optimized. Off diuretic creatinine continued to gradually improve and as has had no symptoms in prolonged period of time, seemed best to defer angiography until renal function fully back to baseline. Discharged to home, but returns volume overloaded, thus once again seeking to optimize. Still testing positive for COVID. Anticipate will be out of isolation early next week and volume status optimized, thus can then proceed with coronary angiography.    To contact call Cardiology Fellow or Attending as listed on [email or web address] password: [email or web address].

## 2022-12-15 NOTE — PROGRESS NOTE ADULT - PROBLEM SELECTOR PLAN 4
previously treated with 7 days of levaquin for assumed b/l cellulitis. now w/ leukocytosis, not febrile.  - negative doppler b/l LE  - wound care consulted; topical antifungal tx  - tx with topical steroid (TAC ointment)  - trend fever curve, WBCs  - consider Unasyn if febrile/worsening

## 2022-12-15 NOTE — PROGRESS NOTE ADULT - ASSESSMENT
62M former smoker w/ HFpEF (TTE EF 57%, 11/14/22), HTN, HLD, IDDM, and recent NSTEMI, CKD III, who presented with worsening HF exacerbation in setting of not having diuretics at home.    # Acute decompensated heart failure  - Patient continues to exhibit hypervolemia on exam   - Currently on NC.  O2 as appropriate  - Continue IV Bumex 2mg BID.   - c/w spironolactone  - consider SGLT as outpatient.  - Strict I/O and daily STANDING weight.  - Check electrolytes. K> 4 and Mg >2  - monitor on telemetry  - troponin has downtrended. Patient reports no chest pain, unlikely had recurrent ischemic event. HF exacerbation likely due to lack of diuretics regimen at home. No need for repeat TTE.  - Once patient is more euvolemic with improved kidney function and off isolation with COVID, will discuss regarding ischemic evaluation.  Cardiac cath was delayed during last hospitalization given RADHA  - Nephrology following, unclear etiology of CKD, screening workup recommended. Appears to have CKD3B and renal function close to baseline per nephrology  - Pt likely off isolation on Monday 12/19, will assess volume status at that time for angiogram.   - HTN management by primary team.      Bea Mitchell MD  Cardiology Fellow     Recommendations are preliminary until cosigned by attending    62M former smoker w/ HFpEF (TTE EF 57%, 11/14/22), HTN, HLD, IDDM, and recent NSTEMI, CKD III, who presented with worsening HF exacerbation in setting of not having diuretics at home.    # Acute decompensated heart failure  - Patient continues to exhibit hypervolemia on exam   - Currently on NC.  O2 as appropriate  - Continue IV Bumex 2mg BID.   - c/w spironolactone  - consider SGLT as outpatient.  - Strict I/O and daily STANDING weight.  - Check electrolytes. K> 4 and Mg >2  - monitor on telemetry  - troponin has downtrended. Patient reports no chest pain, unlikely had recurrent ischemic event. HF exacerbation likely due to lack of diuretics regimen at home. No need for repeat TTE.  - Once patient is more euvolemic with improved kidney function and off isolation with COVID, will discuss regarding ischemic evaluation.  Cardiac cath was delayed during last hospitalization given RADHA  - Nephrology following, unclear etiology of CKD, screening workup recommended. Appears to have CKD3B and renal function close to baseline per nephrology  - Pt likely off isolation on Monday 12/19, will assess volume status at that time for angiogram.   - HTN management by primary team.      Bea Mitchell MD  Cardiology Fellow

## 2022-12-15 NOTE — MEDICAL STUDENT PROGRESS NOTE(EDUCATION) - NS MD HP STUD ASPLAN PLAN FT
1.  acute HF exacerbation  - 2mg IV bumix BID with goal of 2-2.5L output, will discuss with cards to increase bumix as hypervolemia is not changing signfincantly been 5 days.  - 50mg daily spirolactone  - 1L oral intake limit, daily weight, I&O  - when euvolemic and off covid precaution= cardoi due ischemic workup indicating cath will reach out when more euvolemic to do possible cath end of day if on isolation  -nephro cleared cath in setting of CKD  - appreciate cardio recs    2: COVID  - stable onRA asymtomatic, 93% SaO2 not requiring steriods or treatment for covid  -monitor tele for any desat and palce on 2L NC if de sat below 88%  - diagnosed dec 9 precuations for 5 days until 12/19  -after day10  of diagnosis can change to non covid room and continue with cardiac procedure    3: CKD stage 3b  - base at 1.9-2 from year ago  - serum Cr lower then baseline, no RADHA as was monitoring previously  -order hep B, C, ligh tchain, urine P/C Na osm, and SIFE to rule out other etiology of elevated Cr  - claered per nephro for the cath    4: Stasis dermatitis  - exam showning erythematous nontender bilateral lower extremity but both swollen  - - b/l lower extremity duplex  - wound care consulted now giving fungal/b-methasone cream BID  - monitor fever, WBC for signs of cellulitus and abegin abx if change in status  - conitnue with diuretics  -encourage ambulation    5: HTN  - labile last night will continue to monitor and change mangement based on next vitals, asymtomatic  - continue with HTN medication mangement  -monitor vitals for changes in BP/HR    6: DM2  - a1c=8.7  - serum glucose within goal  - conintue with lantus 45 daily, lispro 10 with meal, and ISS    7: HLD  - baby ASA and 80 atorvastatin    9: normocytic anemia  - base low/mid 10s  - possible secondary to EPO def from CKD  -will get reticuocyte count  - possible workup with Fe panela nd if gets to 8    10: DVT ppx  - 5000 sub q hep    11: Dispo  - PT home per recs  - diet DASH + shakes per dietician rec

## 2022-12-15 NOTE — PROGRESS NOTE ADULT - ATTENDING COMMENTS
62M former smoker w/ HFpEF (TTE 11/14/22 EF 57%), h/o NSTEMI, HTN, HLD, T2DM on insulin, p/w worsening SOB, leg/scrotal edema, c/f ADHF, found covid-19 positive.    #Acute on chronic systolic HF: (recent echo with EF 40-45%)   recent hosp ~ 1 month ago. not discharged home on diuretics due to renal dysfunction.   Cont with IV bumex 2mg BID. monitor StrictI+Os. daily weights. keep neg.   eventual plans for further ischemic w/u - possible cath.   #COVID+: clinically with some mild cough. no significant dyspnea not off supplement   will titrate off oxygen, cont to hold off treatment unless significant change  #RADHA on CKD3: resolved.   Cr  plateaued. will monitor while cont with diuresis.   strict I+Os, weight, Cr.

## 2022-12-15 NOTE — PROGRESS NOTE ADULT - SUBJECTIVE AND OBJECTIVE BOX
Overnight Events: None     Review Of Systems: No chest pain, shortness of breath, or palpitations            Current Meds:  acetaminophen     Tablet .. 650 milliGRAM(s) Oral every 6 hours PRN  albuterol/ipratropium for Nebulization 3 milliLiter(s) Nebulizer every 6 hours PRN  aluminum hydroxide/magnesium hydroxide/simethicone Suspension 30 milliLiter(s) Oral every 4 hours PRN  aspirin enteric coated 81 milliGRAM(s) Oral daily  atorvastatin 80 milliGRAM(s) Oral at bedtime  buMETAnide Injectable 2 milliGRAM(s) IV Push every 12 hours  carvedilol 25 milliGRAM(s) Oral every 12 hours  clopidogrel Tablet 75 milliGRAM(s) Oral daily  clotrimazole/betamethasone Cream 1 Application(s) Topical two times a day  dextrose 5%. 1000 milliLiter(s) IV Continuous <Continuous>  dextrose 5%. 1000 milliLiter(s) IV Continuous <Continuous>  dextrose 50% Injectable 25 Gram(s) IV Push once  dextrose 50% Injectable 12.5 Gram(s) IV Push once  dextrose 50% Injectable 25 Gram(s) IV Push once  dextrose Oral Gel 15 Gram(s) Oral once PRN  gabapentin 100 milliGRAM(s) Oral daily  glucagon  Injectable 1 milliGRAM(s) IntraMuscular once  heparin   Injectable 5000 Unit(s) SubCutaneous every 8 hours  hydrALAZINE 100 milliGRAM(s) Oral every 8 hours  insulin glargine Injectable (LANTUS) 45 Unit(s) SubCutaneous at bedtime  insulin lispro (ADMELOG) corrective regimen sliding scale   SubCutaneous at bedtime  insulin lispro (ADMELOG) corrective regimen sliding scale   SubCutaneous three times a day before meals  insulin lispro Injectable (ADMELOG) 10 Unit(s) SubCutaneous three times a day before meals  melatonin 3 milliGRAM(s) Oral at bedtime PRN  NIFEdipine XL 60 milliGRAM(s) Oral every 24 hours  ondansetron Injectable 4 milliGRAM(s) IV Push every 8 hours PRN  spironolactone 50 milliGRAM(s) Oral daily      Vitals:  T(F): 98.3 (12-15), Max: 98.5 (12-14)  HR: 56 (12-15) (56 - 68)  BP: 174/78 (12-15) (137/55 - 174/78)  RR: 18 (12-15)  SpO2: 98% (12-15)  I&O's Summary    14 Dec 2022 07:01  -  15 Dec 2022 07:00  --------------------------------------------------------  IN: 1020 mL / OUT: 2250 mL / NET: -1230 mL        Physical Exam:    Appearance: No acute distress; well appearing  Eyes: pink conjunctiva  HEENT: Normal oral mucosa  Cardiovascular: RRR, S1, S2, systolic murmur, JVP difficult to assess due to body habitus, 3+ LE edema with what appear to be chronic LE wounds   Respiratory: Clear to auscultation bilaterally  Gastrointestinal: soft, non-tender, non-distended   Musculoskeletal: No clubbing; no joint deformity   Neurologic: Normal speech, no facial asymmetry  Psychiatry: AAOx3, mood & affect appropriate  Skin: No rashes, ecchymoses, or cyanosis of exposed skin                             9.5    8.45  )-----------( 248      ( 15 Dec 2022 06:21 )             30.5     12-15    138  |  104  |  59<H>  ----------------------------<  155<H>  4.7   |  23  |  1.83<H>    Ca    8.9      15 Dec 2022 06:21  Phos  4.6     12-15  Mg     2.4     12-15    TPro  7.1  /  Alb  3.6  /  TBili  0.5  /  DBili  x   /  AST  32  /  ALT  39  /  AlkPhos  112  12-15    PT/INR - ( 15 Dec 2022 06:21 )   PT: 14.6 sec;   INR: 1.26 ratio         PTT - ( 15 Dec 2022 06:21 )  PTT:30.9 sec  CARDIAC MARKERS ( 09 Dec 2022 19:44 )  57 ng/L / x     / x     / 662 U/L / x     / 9.8 ng/mL  CARDIAC MARKERS ( 09 Dec 2022 17:49 )  65 ng/L / x     / x     / x     / x     / x          Serum Pro-Brain Natriuretic Peptide: 1651 pg/mL (12-12 @ 06:14)  Serum Pro-Brain Natriuretic Peptide: 1816 pg/mL (12-09 @ 17:49)            Interpretation of Telemetry: Sinus 50s-60s

## 2022-12-15 NOTE — PROGRESS NOTE ADULT - SUBJECTIVE AND OBJECTIVE BOX
Candida Castro MD  Internal Medicine, PGY-1        Patient Name: MARY ROBIN  Patient MRN: [id number]    Patient is a 62y old  Male who presents with a chief complaint of HF exacerbation (14 Dec 2022 15:52)      **SUBJECTIVE**    Last 24 hours: No acute events overnight. Patient kept on NC overnight with high 90s SpO2 despite feeling no different with the NC on or off. This AM, patient states he continues to feel well. Denies CP, SOB, lightheadedness, HA, n/v/c/d.     Review of Systems negative as above      **OBJECTIVE**        VITALS:   T(C): 36.8 (12-15-22 @ 05:54), Max: 36.9 (12-14-22 @ 12:10)  HR: 56 (12-15-22 @ 05:54) (56 - 68)  BP: 174/78 (12-15-22 @ 06:34) (137/55 - 174/78)  RR: 18 (12-15-22 @ 05:54) (18 - 18)  SpO2: 98% (12-15-22 @ 05:54) (92% - 98%)    GENERAL: NAD, lying in bed comfortably  HEAD:  Normocephalic  EYES: Sclera clear  ENT: Moist mucous membranes  NECK: Supple, No JVD  CHEST/LUNG: Clear to auscultation bilaterally; No rales, rhonchi, wheezing, or rubs. Unlabored respirations  HEART: Regular rate and rhythm; No murmurs, rubs, or gallops  ABDOMEN: BSx4; Soft, nontender, nondistended  EXTREMITIES:  2+ Peripheral Pulses, brisk capillary refill. significant b/l LE edema. + Scrotal edema  NERVOUS SYSTEM:  A&Ox3, no focal deficits   SKIN: chronic venous stasis dermatitis b/l LE    Labs  12-15    138  |  104  |  59<H>  ----------------------------<  155<H>  4.7   |  23  |  1.83<H>    Ca    8.9      15 Dec 2022 06:21  Phos  4.6     12-15  Mg     2.4     12-15    TPro  7.1  /  Alb  3.6  /  TBili  0.5  /  DBili  x   /  AST  32  /  ALT  39  /  AlkPhos  112  12-15    CBC Full  -  ( 15 Dec 2022 06:21 )  WBC Count : 8.45 K/uL  RBC Count : 3.24 M/uL  Hemoglobin : 9.5 g/dL  Hematocrit : 30.5 %  Platelet Count - Automated : 248 K/uL  Mean Cell Volume : 94.1 fl  Mean Cell Hemoglobin : 29.3 pg  Mean Cell Hemoglobin Concentration : 31.1 gm/dL  Auto Neutrophil # : x  Auto Lymphocyte # : x  Auto Monocyte # : x  Auto Eosinophil # : x  Auto Basophil # : x  Auto Neutrophil % : x  Auto Lymphocyte % : x  Auto Monocyte % : x  Auto Eosinophil % : x  Auto Basophil % : x          PT/INR - ( 14 Dec 2022 07:27 )   PT: 14.0 sec;   INR: 1.21 ratio         PTT - ( 14 Dec 2022 07:27 )  PTT:30.6 sec    POC Glucose  CAPILLARY BLOOD GLUCOSE      POCT Blood Glucose.: 187 mg/dL (14 Dec 2022 21:08)  POCT Blood Glucose.: 126 mg/dL (14 Dec 2022 17:11)  POCT Blood Glucose.: 127 mg/dL (14 Dec 2022 11:43)  POCT Blood Glucose.: 104 mg/dL (14 Dec 2022 07:53)      I&O's  I&O's Summary    14 Dec 2022 07:01  -  15 Dec 2022 07:00  --------------------------------------------------------  IN: 1020 mL / OUT: 2250 mL / NET: -1230 mL        UA (if applicable)      Micro (if applicable)      Imaging (if applicable)    Active Medications  MEDICATIONS  (STANDING):  aspirin enteric coated 81 milliGRAM(s) Oral daily  atorvastatin 80 milliGRAM(s) Oral at bedtime  buMETAnide Injectable 2 milliGRAM(s) IV Push every 12 hours  carvedilol 25 milliGRAM(s) Oral every 12 hours  clopidogrel Tablet 75 milliGRAM(s) Oral daily  clotrimazole/betamethasone Cream 1 Application(s) Topical two times a day  dextrose 5%. 1000 milliLiter(s) (50 mL/Hr) IV Continuous <Continuous>  dextrose 5%. 1000 milliLiter(s) (100 mL/Hr) IV Continuous <Continuous>  dextrose 50% Injectable 25 Gram(s) IV Push once  dextrose 50% Injectable 12.5 Gram(s) IV Push once  dextrose 50% Injectable 25 Gram(s) IV Push once  gabapentin 100 milliGRAM(s) Oral daily  glucagon  Injectable 1 milliGRAM(s) IntraMuscular once  heparin   Injectable 5000 Unit(s) SubCutaneous every 8 hours  hydrALAZINE 100 milliGRAM(s) Oral every 8 hours  insulin glargine Injectable (LANTUS) 45 Unit(s) SubCutaneous at bedtime  insulin lispro (ADMELOG) corrective regimen sliding scale   SubCutaneous at bedtime  insulin lispro (ADMELOG) corrective regimen sliding scale   SubCutaneous three times a day before meals  insulin lispro Injectable (ADMELOG) 10 Unit(s) SubCutaneous three times a day before meals  NIFEdipine XL 60 milliGRAM(s) Oral every 24 hours  spironolactone 50 milliGRAM(s) Oral daily    MEDICATIONS  (PRN):  acetaminophen     Tablet .. 650 milliGRAM(s) Oral every 6 hours PRN Temp greater or equal to 38C (100.4F), Mild Pain (1 - 3)  albuterol/ipratropium for Nebulization 3 milliLiter(s) Nebulizer every 6 hours PRN Shortness of Breath and/or Wheezing  aluminum hydroxide/magnesium hydroxide/simethicone Suspension 30 milliLiter(s) Oral every 4 hours PRN Dyspepsia  dextrose Oral Gel 15 Gram(s) Oral once PRN Blood Glucose LESS THAN 70 milliGRAM(s)/deciliter  melatonin 3 milliGRAM(s) Oral at bedtime PRN Insomnia  ondansetron Injectable 4 milliGRAM(s) IV Push every 8 hours PRN Nausea and/or Vomiting

## 2022-12-16 LAB
ANION GAP SERPL CALC-SCNC: 15 MMOL/L — SIGNIFICANT CHANGE UP (ref 5–17)
APTT BLD: 30.3 SEC — SIGNIFICANT CHANGE UP (ref 27.5–35.5)
BUN SERPL-MCNC: 58 MG/DL — HIGH (ref 7–23)
CALCIUM SERPL-MCNC: 8.8 MG/DL — SIGNIFICANT CHANGE UP (ref 8.4–10.5)
CHLORIDE SERPL-SCNC: 103 MMOL/L — SIGNIFICANT CHANGE UP (ref 96–108)
CO2 SERPL-SCNC: 21 MMOL/L — LOW (ref 22–31)
CREAT SERPL-MCNC: 1.76 MG/DL — HIGH (ref 0.5–1.3)
EGFR: 43 ML/MIN/1.73M2 — LOW
GLUCOSE BLDC GLUCOMTR-MCNC: 132 MG/DL — HIGH (ref 70–99)
GLUCOSE BLDC GLUCOMTR-MCNC: 138 MG/DL — HIGH (ref 70–99)
GLUCOSE BLDC GLUCOMTR-MCNC: 162 MG/DL — HIGH (ref 70–99)
GLUCOSE BLDC GLUCOMTR-MCNC: 203 MG/DL — HIGH (ref 70–99)
GLUCOSE SERPL-MCNC: 152 MG/DL — HIGH (ref 70–99)
HBV SURFACE AB SER-ACNC: SIGNIFICANT CHANGE UP
HBV SURFACE AG SER-ACNC: SIGNIFICANT CHANGE UP
HCT VFR BLD CALC: 31.2 % — LOW (ref 39–50)
HGB BLD-MCNC: 9.7 G/DL — LOW (ref 13–17)
INR BLD: 1.2 RATIO — HIGH (ref 0.88–1.16)
MAGNESIUM SERPL-MCNC: 2.2 MG/DL — SIGNIFICANT CHANGE UP (ref 1.6–2.6)
MCHC RBC-ENTMCNC: 29.2 PG — SIGNIFICANT CHANGE UP (ref 27–34)
MCHC RBC-ENTMCNC: 31.1 GM/DL — LOW (ref 32–36)
MCV RBC AUTO: 94 FL — SIGNIFICANT CHANGE UP (ref 80–100)
NRBC # BLD: 0 /100 WBCS — SIGNIFICANT CHANGE UP (ref 0–0)
PHOSPHATE SERPL-MCNC: 4.5 MG/DL — SIGNIFICANT CHANGE UP (ref 2.5–4.5)
PLATELET # BLD AUTO: 228 K/UL — SIGNIFICANT CHANGE UP (ref 150–400)
POTASSIUM SERPL-MCNC: 4.7 MMOL/L — SIGNIFICANT CHANGE UP (ref 3.5–5.3)
POTASSIUM SERPL-SCNC: 4.7 MMOL/L — SIGNIFICANT CHANGE UP (ref 3.5–5.3)
PROTHROM AB SERPL-ACNC: 13.8 SEC — HIGH (ref 10.5–13.4)
RBC # BLD: 3.32 M/UL — LOW (ref 4.2–5.8)
RBC # FLD: 14 % — SIGNIFICANT CHANGE UP (ref 10.3–14.5)
SODIUM SERPL-SCNC: 139 MMOL/L — SIGNIFICANT CHANGE UP (ref 135–145)
UUN UR-MCNC: 585 MG/DL — SIGNIFICANT CHANGE UP
WBC # BLD: 8.66 K/UL — SIGNIFICANT CHANGE UP (ref 3.8–10.5)
WBC # FLD AUTO: 8.66 K/UL — SIGNIFICANT CHANGE UP (ref 3.8–10.5)

## 2022-12-16 PROCEDURE — 99233 SBSQ HOSP IP/OBS HIGH 50: CPT | Mod: GC

## 2022-12-16 RX ADMIN — CLOTRIMAZOLE AND BETAMETHASONE DIPROPIONATE 1 APPLICATION(S): 10; .5 CREAM TOPICAL at 05:02

## 2022-12-16 RX ADMIN — BUMETANIDE 2 MILLIGRAM(S): 0.25 INJECTION INTRAMUSCULAR; INTRAVENOUS at 17:36

## 2022-12-16 RX ADMIN — Medication 81 MILLIGRAM(S): at 12:18

## 2022-12-16 RX ADMIN — Medication 10 UNIT(S): at 17:34

## 2022-12-16 RX ADMIN — CLOTRIMAZOLE AND BETAMETHASONE DIPROPIONATE 1 APPLICATION(S): 10; .5 CREAM TOPICAL at 18:46

## 2022-12-16 RX ADMIN — HEPARIN SODIUM 5000 UNIT(S): 5000 INJECTION INTRAVENOUS; SUBCUTANEOUS at 12:19

## 2022-12-16 RX ADMIN — CARVEDILOL PHOSPHATE 25 MILLIGRAM(S): 80 CAPSULE, EXTENDED RELEASE ORAL at 17:34

## 2022-12-16 RX ADMIN — SPIRONOLACTONE 50 MILLIGRAM(S): 25 TABLET, FILM COATED ORAL at 05:00

## 2022-12-16 RX ADMIN — HEPARIN SODIUM 5000 UNIT(S): 5000 INJECTION INTRAVENOUS; SUBCUTANEOUS at 05:00

## 2022-12-16 RX ADMIN — BUMETANIDE 2 MILLIGRAM(S): 0.25 INJECTION INTRAMUSCULAR; INTRAVENOUS at 05:01

## 2022-12-16 RX ADMIN — Medication 650 MILLIGRAM(S): at 22:07

## 2022-12-16 RX ADMIN — Medication 650 MILLIGRAM(S): at 04:33

## 2022-12-16 RX ADMIN — Medication 1: at 12:18

## 2022-12-16 RX ADMIN — CARVEDILOL PHOSPHATE 25 MILLIGRAM(S): 80 CAPSULE, EXTENDED RELEASE ORAL at 05:00

## 2022-12-16 RX ADMIN — HEPARIN SODIUM 5000 UNIT(S): 5000 INJECTION INTRAVENOUS; SUBCUTANEOUS at 22:06

## 2022-12-16 RX ADMIN — Medication 650 MILLIGRAM(S): at 23:03

## 2022-12-16 RX ADMIN — Medication 100 MILLIGRAM(S): at 05:02

## 2022-12-16 RX ADMIN — Medication 650 MILLIGRAM(S): at 03:01

## 2022-12-16 RX ADMIN — INSULIN GLARGINE 45 UNIT(S): 100 INJECTION, SOLUTION SUBCUTANEOUS at 22:08

## 2022-12-16 RX ADMIN — ATORVASTATIN CALCIUM 80 MILLIGRAM(S): 80 TABLET, FILM COATED ORAL at 22:07

## 2022-12-16 RX ADMIN — Medication 10 UNIT(S): at 08:44

## 2022-12-16 RX ADMIN — CLOPIDOGREL BISULFATE 75 MILLIGRAM(S): 75 TABLET, FILM COATED ORAL at 12:19

## 2022-12-16 RX ADMIN — Medication 100 MILLIGRAM(S): at 22:07

## 2022-12-16 RX ADMIN — Medication 100 MILLIGRAM(S): at 12:19

## 2022-12-16 RX ADMIN — Medication 10 UNIT(S): at 12:18

## 2022-12-16 RX ADMIN — GABAPENTIN 100 MILLIGRAM(S): 400 CAPSULE ORAL at 12:19

## 2022-12-16 RX ADMIN — Medication 60 MILLIGRAM(S): at 05:02

## 2022-12-16 NOTE — MEDICAL STUDENT PROGRESS NOTE(EDUCATION) - NS MD HP STUD ASPLAN PLAN FT
1.  acute HF exacerbation  - 2mg IV bumix BID with goal of 2-2.5L output.  - 50mg daily spirolactone  - 1L oral intake limit, daily weight, I&O (hypervolemia presetnation unchanged but weight, consistnent neg net output)   - when euvolemic and off covid precaution= cardo to due ischemic workup for possible cath(later in the week) on monday.   -nephro cleared cath in setting of CKD  - appreciate cardio recs    2: COVID  -asymtomatic  - continue to mointor wean patient off NC,   - trying to bring patient to 5M to allow for continous pulse ox to monitor for true desaturation  - diagnosed dec 9 precuations for 5 days until 12/19  -after day10  of diagnosis can change to non covid room and continue with cardiac procedure    3: CKD stage 3b  - base at 1.9-2 from year ago  - serum Cr lower then baseline, no RADHA as was monitoring previously  -order hep B, C, ligh tchain, urine P/C Na osm, and SIFE to rule out other etiology of elevated Cr-->urine back with elevated P/C pending protein/light chain workkup.   - claered per nephro for the cath    4: Stasis dermatitis  - exam showning erythematous nontender bilateral lower extremity but both swollen  - - b/l lower extremity duplex  - wound care consulted now giving fungal/b-methasone cream BID  - monitor fever, WBC for signs of cellulitus and abegin abx if change in status  - conitnue with diuretics  -encourage ambulation    5: HTN  - labile last night will continue to monitor and change mangement based on next vitals, asymtomatic  - continue with HTN medication mangement  -monitor vitals for changes in BP/HR    6: DM2  - a1c=8.7  - serum glucose within goal  - conintue with lantus 45 daily, lispro 10 with meal, and ISS    7: HLD  - baby ASA and 80 atorvastatin    9: normocytic anemia  - base low/mid 10s  - possible secondary to EPO def from CKD  -will get reticuocyte count  - possible workup with Fe panela nd if gets to 8    10: DVT ppx  - 5000 sub q hep    11: Dispo  - PT home per recs  - diet DASH + shakes per dietician rec

## 2022-12-16 NOTE — MEDICAL STUDENT PROGRESS NOTE(EDUCATION) - SUBJECTIVE AND OBJECTIVE BOX
Patient is a 62y old  Male who presents with a chief complaint of HF exacerbation (11 Dec 2022 07:02)      **SUBJECTIVE**    Last 24 hours: No acute events overnight. This morning patient says he feels good much better then when admitted. Indicates no cough, chills, abd pain, diarrhea, SOB even when walking. Only concen is when walking the scrotal swelling bothersome.      **OBJECTIVE**        VITALS:   T(C): 36.7 (12-12-22 @ 04:41), Max: 36.9 (12-11-22 @ 22:21)  HR: 59 (12-12-22 @ 04:41) (57 - 63)  BP: 169/77 (12-12-22 @ 04:41) (145/64 - 169/77)  RR: 18 (12-12-22 @ 04:41) (18 - 18)  SpO2: 94% (12-12-22 @ 04:41) (93% - 95%)    GENERAL: NAD, lying in bed comfortably  HEAD:  Normocephalic, atrumatic  EYES: Sclera clear  ENT: Moist mucous membranes  NECK: Supple, No JVD  CHEST/LUNG: Clear to auscultation bilaterally; No rales, rhonchi, wheezing, or rubs. Unlabored respirations  HEART: Regular rate and rhythm; No murmurs, rubs, or gallops  ABDOMEN: BSx4; Soft, nontender, nondistended  EXTREMITIES:  diffuse bilateral edema with overlying chronic stasis dermatitis, left lower extremity is signfincantly more erythematous then right but both covered by ace wrap didn't unwrap.  NERVOUS SYSTEM:  A&Ox3, no focal deficits   SKIN: chronic stasis dermatitis (skin weeping, slouging, erythema)    Labs  12-12    138  |  106  |  55<H>  ----------------------------<  130<H>  4.5   |  20<L>  |  1.91<H>    Ca    8.2<L>      12 Dec 2022 06:14  Phos  3.8     12-12  Mg     2.7     12-12    TPro  6.6  /  Alb  3.3  /  TBili  0.5  /  DBili  x   /  AST  29  /  ALT  35  /  AlkPhos  100  12-12    CBC Full  -  ( 12 Dec 2022 06:12 )  WBC Count : 10.25 K/uL  RBC Count : 3.22 M/uL  Hemoglobin : 9.4 g/dL  Hematocrit : 30.2 %  Platelet Count - Automated : 238 K/uL  Mean Cell Volume : 93.8 fl  Mean Cell Hemoglobin : 29.2 pg  Mean Cell Hemoglobin Concentration : 31.1 gm/dL  Auto Neutrophil # : x  Auto Lymphocyte # : x  Auto Monocyte # : x  Auto Eosinophil # : x  Auto Basophil # : x  Auto Neutrophil % : x  Auto Lymphocyte % : x  Auto Monocyte % : x  Auto Eosinophil % : x  Auto Basophil % : x          PT/INR - ( 11 Dec 2022 06:16 )   PT: 14.9 sec;   INR: 1.28 ratio         PTT - ( 11 Dec 2022 06:16 )  PTT:21.2 sec    POC Glucose  CAPILLARY BLOOD GLUCOSE      POCT Blood Glucose.: 173 mg/dL (11 Dec 2022 21:41)  POCT Blood Glucose.: 114 mg/dL (11 Dec 2022 17:13)  POCT Blood Glucose.: 136 mg/dL (11 Dec 2022 12:20)  POCT Blood Glucose.: 130 mg/dL (11 Dec 2022 08:54)      I&O's  I&O's Summary    10 Dec 2022 07:01  -  11 Dec 2022 07:00  --------------------------------------------------------  IN: 225 mL / OUT: 2150 mL / NET: -1925 mL    11 Dec 2022 07:01  -  12 Dec 2022 06:58  --------------------------------------------------------  IN: 720 mL / OUT: 1850 mL / NET: -1130 mL        UA (if applicable)      Micro (if applicable)      Imaging (if applicable)    Active Medications  MEDICATIONS  (STANDING):  aspirin enteric coated 81 milliGRAM(s) Oral daily  atorvastatin 80 milliGRAM(s) Oral at bedtime  buMETAnide Injectable 2 milliGRAM(s) IV Push every 12 hours  carvedilol 25 milliGRAM(s) Oral every 12 hours  clopidogrel Tablet 75 milliGRAM(s) Oral daily  dextrose 5%. 1000 milliLiter(s) (100 mL/Hr) IV Continuous <Continuous>  dextrose 5%. 1000 milliLiter(s) (50 mL/Hr) IV Continuous <Continuous>  dextrose 50% Injectable 25 Gram(s) IV Push once  dextrose 50% Injectable 12.5 Gram(s) IV Push once  dextrose 50% Injectable 25 Gram(s) IV Push once  gabapentin 100 milliGRAM(s) Oral daily  glucagon  Injectable 1 milliGRAM(s) IntraMuscular once  heparin   Injectable 5000 Unit(s) SubCutaneous every 8 hours  hydrALAZINE 100 milliGRAM(s) Oral every 8 hours  insulin glargine Injectable (LANTUS) 45 Unit(s) SubCutaneous at bedtime  insulin lispro (ADMELOG) corrective regimen sliding scale   SubCutaneous three times a day before meals  insulin lispro (ADMELOG) corrective regimen sliding scale   SubCutaneous at bedtime  insulin lispro Injectable (ADMELOG) 10 Unit(s) SubCutaneous three times a day before meals  NIFEdipine XL 60 milliGRAM(s) Oral every 24 hours  spironolactone 50 milliGRAM(s) Oral daily  triamcinolone 0.1% Cream 1 Application(s) Topical every 12 hours    MEDICATIONS  (PRN):  acetaminophen     Tablet .. 650 milliGRAM(s) Oral every 6 hours PRN Temp greater or equal to 38C (100.4F), Mild Pain (1 - 3)  albuterol/ipratropium for Nebulization 3 milliLiter(s) Nebulizer every 6 hours PRN Shortness of Breath and/or Wheezing  aluminum hydroxide/magnesium hydroxide/simethicone Suspension 30 milliLiter(s) Oral every 4 hours PRN Dyspepsia  dextrose Oral Gel 15 Gram(s) Oral once PRN Blood Glucose LESS THAN 70 milliGRAM(s)/deciliter  melatonin 3 milliGRAM(s) Oral at bedtime PRN Insomnia  ondansetron Injectable 4 milliGRAM(s) IV Push every 8 hours PRN Nausea and/or Vomiting        
PROGRESS NOTE:   Patient is a 62y old  Male who presents with a chief complaint of HF exacerbation (15 Dec 2022 08:45)      SUBJECTIVE / OVERNIGHT EVENTS:  No acute events overnight. trouble sleeping last night due to bed , indicates needing to switch postiions constantly and back back/butt pain. Indicates no respiratory symptoms while of O2.    ADDITIONAL REVIEW OF SYSTEMS:  Patient denies fevers, chills, chest pain, shortness of breath, nausea, abdominal pain, diarrhea, constipation, dysuria, leg swelling, headache, light headedness.    MEDICATIONS  (STANDING):  aspirin enteric coated 81 milliGRAM(s) Oral daily  atorvastatin 80 milliGRAM(s) Oral at bedtime  buMETAnide Injectable 2 milliGRAM(s) IV Push every 12 hours  carvedilol 25 milliGRAM(s) Oral every 12 hours  clopidogrel Tablet 75 milliGRAM(s) Oral daily  clotrimazole/betamethasone Cream 1 Application(s) Topical two times a day  dextrose 5%. 1000 milliLiter(s) (50 mL/Hr) IV Continuous <Continuous>  dextrose 5%. 1000 milliLiter(s) (100 mL/Hr) IV Continuous <Continuous>  dextrose 50% Injectable 25 Gram(s) IV Push once  dextrose 50% Injectable 12.5 Gram(s) IV Push once  dextrose 50% Injectable 25 Gram(s) IV Push once  gabapentin 100 milliGRAM(s) Oral daily  glucagon  Injectable 1 milliGRAM(s) IntraMuscular once  heparin   Injectable 5000 Unit(s) SubCutaneous every 8 hours  hydrALAZINE 100 milliGRAM(s) Oral every 8 hours  insulin glargine Injectable (LANTUS) 45 Unit(s) SubCutaneous at bedtime  insulin lispro (ADMELOG) corrective regimen sliding scale   SubCutaneous at bedtime  insulin lispro (ADMELOG) corrective regimen sliding scale   SubCutaneous three times a day before meals  insulin lispro Injectable (ADMELOG) 10 Unit(s) SubCutaneous three times a day before meals  NIFEdipine XL 60 milliGRAM(s) Oral every 24 hours  spironolactone 50 milliGRAM(s) Oral daily    MEDICATIONS  (PRN):  acetaminophen     Tablet .. 650 milliGRAM(s) Oral every 6 hours PRN Temp greater or equal to 38C (100.4F), Mild Pain (1 - 3)  albuterol/ipratropium for Nebulization 3 milliLiter(s) Nebulizer every 6 hours PRN Shortness of Breath and/or Wheezing  aluminum hydroxide/magnesium hydroxide/simethicone Suspension 30 milliLiter(s) Oral every 4 hours PRN Dyspepsia  dextrose Oral Gel 15 Gram(s) Oral once PRN Blood Glucose LESS THAN 70 milliGRAM(s)/deciliter  melatonin 3 milliGRAM(s) Oral at bedtime PRN Insomnia  ondansetron Injectable 4 milliGRAM(s) IV Push every 8 hours PRN Nausea and/or Vomiting      CAPILLARY BLOOD GLUCOSE      POCT Blood Glucose.: 120 mg/dL (15 Dec 2022 08:09)  POCT Blood Glucose.: 187 mg/dL (14 Dec 2022 21:08)  POCT Blood Glucose.: 126 mg/dL (14 Dec 2022 17:11)  POCT Blood Glucose.: 127 mg/dL (14 Dec 2022 11:43)    I&O's Summary    14 Dec 2022 07:01  -  15 Dec 2022 07:00  --------------------------------------------------------  IN: 1020 mL / OUT: 2250 mL / NET: -1230 mL        PHYSICAL EXAM:  Vital Signs Last 24 Hrs  T(C): 36.8 (15 Dec 2022 05:54), Max: 36.9 (14 Dec 2022 12:10)  T(F): 98.3 (15 Dec 2022 05:54), Max: 98.5 (14 Dec 2022 12:10)  HR: 56 (15 Dec 2022 05:54) (56 - 68)  BP: 174/78 (15 Dec 2022 06:34) (137/55 - 174/78)  BP(mean): --  RR: 18 (15 Dec 2022 05:54) (18 - 18)  SpO2: 98% (15 Dec 2022 05:54) (92% - 98%)    Parameters below as of 15 Dec 2022 05:54  Patient On (Oxygen Delivery Method): nasal cannula        CONSTITUTIONAL: NAD, well-developed  RESPIRATORY: Normal respiratory effort; lungs are clear to auscultation bilaterally  CARDIOVASCULAR: Regular rate and rhythm, normal S1 and S2, no murmur/rub/gallop;2+ JENNIFER essentiallly unchangd since monday, pitting edema up to knee; decrease erythema of lower extremities.  : bilateral scrotal edema without erythema and nontender, clinically unchagned from previous swelling  ABDOMEN: Nontender to palpation, normoactive bowel sounds, no rebound/guarding; No hepatosplenomegaly  MUSCLOSKELETAL: no clubbing or cyanosis of digits; no joint swelling or tenderness to palpation  PSYCH: A+O to person, place, and time; affect appropriate    LABS:                        9.5    8.45  )-----------( 248      ( 15 Dec 2022 06:21 )             30.5     12-15    138  |  104  |  59<H>  ----------------------------<  155<H>  4.7   |  23  |  1.83<H>    Ca    8.9      15 Dec 2022 06:21  Phos  4.6     12-15  Mg     2.4     12-15    TPro  7.1  /  Alb  3.6  /  TBili  0.5  /  DBili  x   /  AST  32  /  ALT  39  /  AlkPhos  112  12-15    PT/INR - ( 15 Dec 2022 06:21 )   PT: 14.6 sec;   INR: 1.26 ratio         PTT - ( 15 Dec 2022 06:21 )  PTT:30.9 sec            Tele Reviewed:    RADIOLOGY & ADDITIONAL TESTS:  Results Reviewed:   Imaging Personally Reviewed:  Electrocardiogram Personally Reviewed:    
PROGRESS NOTE:     Patient is a 62y old  Male who presents with a chief complaint of HF exacerbation (16 Dec 2022 08:17)      SUBJECTIVE / OVERNIGHT EVENTS:  No acute events overnight. Patient only noted some scrotal discomfort got tyenol and improved. Notes no signfincant difference in leg/scrotal swelling. No respiratory symptoms noted.     ADDITIONAL REVIEW OF SYSTEMS:  Patient denies fevers, chills, chest pain, shortness of breath, nausea, abdominal pain, diarrhea, constipation, dysuria, leg swelling, headache, light headedness.    MEDICATIONS  (STANDING):  aspirin enteric coated 81 milliGRAM(s) Oral daily  atorvastatin 80 milliGRAM(s) Oral at bedtime  buMETAnide Injectable 2 milliGRAM(s) IV Push every 12 hours  carvedilol 25 milliGRAM(s) Oral every 12 hours  clopidogrel Tablet 75 milliGRAM(s) Oral daily  clotrimazole/betamethasone Cream 1 Application(s) Topical two times a day  dextrose 5%. 1000 milliLiter(s) (50 mL/Hr) IV Continuous <Continuous>  dextrose 5%. 1000 milliLiter(s) (100 mL/Hr) IV Continuous <Continuous>  dextrose 50% Injectable 25 Gram(s) IV Push once  dextrose 50% Injectable 25 Gram(s) IV Push once  dextrose 50% Injectable 12.5 Gram(s) IV Push once  gabapentin 100 milliGRAM(s) Oral daily  glucagon  Injectable 1 milliGRAM(s) IntraMuscular once  heparin   Injectable 5000 Unit(s) SubCutaneous every 8 hours  hydrALAZINE 100 milliGRAM(s) Oral every 8 hours  insulin glargine Injectable (LANTUS) 45 Unit(s) SubCutaneous at bedtime  insulin lispro (ADMELOG) corrective regimen sliding scale   SubCutaneous at bedtime  insulin lispro (ADMELOG) corrective regimen sliding scale   SubCutaneous three times a day before meals  insulin lispro Injectable (ADMELOG) 10 Unit(s) SubCutaneous three times a day before meals  NIFEdipine XL 60 milliGRAM(s) Oral every 24 hours  spironolactone 50 milliGRAM(s) Oral daily    MEDICATIONS  (PRN):  acetaminophen     Tablet .. 650 milliGRAM(s) Oral every 6 hours PRN Temp greater or equal to 38C (100.4F), Mild Pain (1 - 3)  albuterol/ipratropium for Nebulization 3 milliLiter(s) Nebulizer every 6 hours PRN Shortness of Breath and/or Wheezing  aluminum hydroxide/magnesium hydroxide/simethicone Suspension 30 milliLiter(s) Oral every 4 hours PRN Dyspepsia  dextrose Oral Gel 15 Gram(s) Oral once PRN Blood Glucose LESS THAN 70 milliGRAM(s)/deciliter  melatonin 3 milliGRAM(s) Oral at bedtime PRN Insomnia  ondansetron Injectable 4 milliGRAM(s) IV Push every 8 hours PRN Nausea and/or Vomiting  sodium chloride 0.65% Nasal 1 Spray(s) Both Nostrils every 6 hours PRN congestion/dryness      CAPILLARY BLOOD GLUCOSE      POCT Blood Glucose.: 162 mg/dL (16 Dec 2022 11:43)  POCT Blood Glucose.: 138 mg/dL (16 Dec 2022 08:10)  POCT Blood Glucose.: 216 mg/dL (15 Dec 2022 21:36)  POCT Blood Glucose.: 138 mg/dL (15 Dec 2022 18:06)  POCT Blood Glucose.: 90 mg/dL (15 Dec 2022 17:11)    I&O's Summary    15 Dec 2022 07:01  -  16 Dec 2022 07:00  --------------------------------------------------------  IN: 1080 mL / OUT: 2800 mL / NET: -1720 mL    16 Dec 2022 07:01  -  16 Dec 2022 12:00  --------------------------------------------------------  IN: 240 mL / OUT: 450 mL / NET: -210 mL        PHYSICAL EXAM:  Vital Signs Last 24 Hrs  T(C): 36.5 (16 Dec 2022 04:48), Max: 36.8 (15 Dec 2022 21:53)  T(F): 97.7 (16 Dec 2022 04:48), Max: 98.2 (15 Dec 2022 21:53)  HR: 63 (16 Dec 2022 04:48) (61 - 65)  BP: 165/71 (16 Dec 2022 04:48) (152/62 - 178/71)  BP(mean): --  RR: 18 (16 Dec 2022 04:48) (18 - 18)  SpO2: 93% (16 Dec 2022 09:12) (86% - 95%)    Parameters below as of 16 Dec 2022 09:12  Patient On (Oxygen Delivery Method): nasal cannula  O2 Flow (L/min): 1.5      CONSTITUTIONAL: NAD, well-developed  RESPIRATORY: Normal respiratory effort; lungs are clear to auscultation bilaterally  CARDIOVASCULAR: Regular rate and rhythm, normal S1 and S2, no murmur/rub/gallop; 2+ lower extremity edema, erytehatma is improving, nontender; Peripheral pulses are 2+ bilaterally  ABDOMEN: Nontender to palpation, normoactive bowel sounds, no rebound/guarding; No hepatosplenomegaly  : bilateral scrotal edema nontender, non erythematous similiar in size as yesterday.  MUSCLOSKELETAL: no clubbing or cyanosis of digits; no joint swelling or tenderness to palpation  PSYCH: A+O to person, place, and time; affect appropriate    LABS:                        9.7    8.66  )-----------( 228      ( 16 Dec 2022 07:06 )             31.2     12-16    139  |  103  |  58<H>  ----------------------------<  152<H>  4.7   |  21<L>  |  1.76<H>    Ca    8.8      16 Dec 2022 07:06  Phos  4.5     12-16  Mg     2.2     12-16    TPro  7.1  /  Alb  3.6  /  TBili  0.5  /  DBili  x   /  AST  32  /  ALT  39  /  AlkPhos  112  12-15    PT/INR - ( 16 Dec 2022 07:06 )   PT: 13.8 sec;   INR: 1.20 ratio         PTT - ( 16 Dec 2022 07:06 )  PTT:30.3 sec            Tele Reviewed: normal sinus 6-70, SaO2 90-94%    RADIOLOGY & ADDITIONAL TESTS:  none    
Patient is a 62y old  Male who presents with a chief complaint of HF exacerbation (13 Dec 2022 08:03)      SUBJECTIVE / OVERNIGHT EVENTS:  No acute events overnight. Patient says he slept well, and indicates no real concerns. Says he belives his leg swelling has gone done a little as well as his testicular swelling. Patient says walking yesterday was okay, just a little difficult due to the leg swelling but without pain. Says he has no SOB, cough, abd pain,  or chills.    ADDITIONAL REVIEW OF SYSTEMS:  Patient denies fevers, chills, chest pain, shortness of breath, nausea, abdominal pain, diarrhea, constipation, dysuria, leg swelling, headache, light headedness.    MEDICATIONS  (STANDING):  aspirin enteric coated 81 milliGRAM(s) Oral daily  atorvastatin 80 milliGRAM(s) Oral at bedtime  buMETAnide Injectable 2 milliGRAM(s) IV Push every 12 hours  carvedilol 25 milliGRAM(s) Oral every 12 hours  clopidogrel Tablet 75 milliGRAM(s) Oral daily  clotrimazole/betamethasone Cream 1 Application(s) Topical two times a day  dextrose 5%. 1000 milliLiter(s) (50 mL/Hr) IV Continuous <Continuous>  dextrose 5%. 1000 milliLiter(s) (100 mL/Hr) IV Continuous <Continuous>  dextrose 50% Injectable 25 Gram(s) IV Push once  dextrose 50% Injectable 12.5 Gram(s) IV Push once  dextrose 50% Injectable 25 Gram(s) IV Push once  gabapentin 100 milliGRAM(s) Oral daily  glucagon  Injectable 1 milliGRAM(s) IntraMuscular once  heparin   Injectable 5000 Unit(s) SubCutaneous every 8 hours  hydrALAZINE 100 milliGRAM(s) Oral every 8 hours  insulin glargine Injectable (LANTUS) 45 Unit(s) SubCutaneous at bedtime  insulin lispro (ADMELOG) corrective regimen sliding scale   SubCutaneous at bedtime  insulin lispro (ADMELOG) corrective regimen sliding scale   SubCutaneous three times a day before meals  insulin lispro Injectable (ADMELOG) 10 Unit(s) SubCutaneous three times a day before meals  NIFEdipine XL 60 milliGRAM(s) Oral every 24 hours  spironolactone 50 milliGRAM(s) Oral daily    MEDICATIONS  (PRN):  acetaminophen     Tablet .. 650 milliGRAM(s) Oral every 6 hours PRN Temp greater or equal to 38C (100.4F), Mild Pain (1 - 3)  albuterol/ipratropium for Nebulization 3 milliLiter(s) Nebulizer every 6 hours PRN Shortness of Breath and/or Wheezing  aluminum hydroxide/magnesium hydroxide/simethicone Suspension 30 milliLiter(s) Oral every 4 hours PRN Dyspepsia  dextrose Oral Gel 15 Gram(s) Oral once PRN Blood Glucose LESS THAN 70 milliGRAM(s)/deciliter  melatonin 3 milliGRAM(s) Oral at bedtime PRN Insomnia  ondansetron Injectable 4 milliGRAM(s) IV Push every 8 hours PRN Nausea and/or Vomiting      CAPILLARY BLOOD GLUCOSE      POCT Blood Glucose.: 110 mg/dL (13 Dec 2022 07:59)  POCT Blood Glucose.: 184 mg/dL (12 Dec 2022 21:58)  POCT Blood Glucose.: 122 mg/dL (12 Dec 2022 16:46)  POCT Blood Glucose.: 191 mg/dL (12 Dec 2022 11:36)  POCT Blood Glucose.: 115 mg/dL (12 Dec 2022 08:29)    I&O's Summary    12 Dec 2022 07:01  -  13 Dec 2022 07:00  --------------------------------------------------------  IN: 240 mL / OUT: 2300 mL / NET: -2060 mL        PHYSICAL EXAM:  Vital Signs Last 24 Hrs  T(C): 36.7 (13 Dec 2022 05:25), Max: 36.7 (12 Dec 2022 21:52)  T(F): 98 (13 Dec 2022 05:25), Max: 98.1 (12 Dec 2022 21:52)  HR: 58 (13 Dec 2022 05:25) (57 - 72)  BP: 153/65 (13 Dec 2022 05:25) (127/63 - 163/66)  BP(mean): --  RR: 18 (13 Dec 2022 05:25) (18 - 18)  SpO2: 96% (13 Dec 2022 05:25) (93% - 96%)    Parameters below as of 13 Dec 2022 05:25  Patient On (Oxygen Delivery Method): nasal cannula  O2 Flow (L/min): 2      CONSTITUTIONAL: NAD, well-developed  RESPIRATORY: Normal respiratory effort; lungs are clear to auscultation bilaterally  CARDIOVASCULAR: Regular rate and rhythm, normal S1 and S2, no murmur/rub/gallop; Bilateral lower extremity edema 2+ (equivocal/mild improved from yesterday). Bilateral LE also erythematous but nontender to palpation; Peripheral pulses are 2+ bilaterally  ABDOMEN: Nontender to palpation, normoactive bowel sounds, no rebound/guarding; No hepatosplenomegaly  : bilateral testicular edema, nontender, nonerythematous, equivocal/mildly improved from yesterday  MUSCLOSKELETAL: no clubbing or cyanosis of digits; no joint swelling or tenderness to palpation  PSYCH: A+O to person, place, and time; affect appropriate    LABS:                        9.4    8.17  )-----------( 230      ( 13 Dec 2022 06:07 )             30.4     12-13    138  |  105  |  52<H>  ----------------------------<  153<H>  4.7   |  20<L>  |  1.80<H>    Ca    8.4      13 Dec 2022 06:07  Phos  3.9     12-13  Mg     2.5     12-13    TPro  6.8  /  Alb  3.3  /  TBili  0.5  /  DBili  x   /  AST  30  /  ALT  37  /  AlkPhos  105  12-13                Tele Reviewed:    RADIOLOGY & ADDITIONAL TESTS:  < from: [facility] Duplex Lower Ext Vein Scan, Bilat (22 @ 11:05) >  RIGHT:  Normal compressibility of the RIGHT common femoral, femoral and popliteal   veins.  Doppler examination shows normal spontaneous and phasic flow.  No RIGHT calf vein thrombosis is detected.    LEFT:  Normal compressibility of the LEFT common femoral, femoral and popliteal   veins.  Doppler examination shows normal spontaneous and phasic flow.  No LEFT calf vein thrombosis is detected.    IMPRESSION:  No evidence of deep venous thrombosis in either lower extremity.    < end of copied text >

## 2022-12-16 NOTE — MEDICAL STUDENT PROGRESS NOTE(EDUCATION) - NS MD HP STUD ASPLAN ASSES FT
63yo M with PMH HFpEF 57%, HTN, DM2, and recent hospitlization for NSTEMI who was discharged home without lasix medication who presetned with SOB, lower extremity swelling bilaterally and scortal swelling consistennt with acute HF exacerbation alongside positive covid test.
61yo M with PMH HFpEF 57%, HTN, DM2, and recent hospitlization for NSTEMI who was discharged home without lasix medication who presetned with SOB, lower extremity swelling bilaterally and scortal swelling consistennt with acute HF exacerbation alongside positive covid test.
63yo M with PMH HFpEF 57%, NSTEMi, HTN, DM2, presetned with SOB, lower extremity swelling bilaterally and scortal swelling consistennt with acute HF exacerbation alongside positive covid test.
63yo M with PMH HFpEF 57%, HTN, DM2, and recent hospitlization for NSTEMI who was discharged home without lasix medication who presetned with SOB, lower extremity swelling bilaterally and scortal swelling consistennt with acute HF exacerbation alongside positive covid test.

## 2022-12-16 NOTE — PROGRESS NOTE ADULT - PROBLEM SELECTOR PLAN 1
NSTEMI last month w/ LHC deferred d/t poor renal function. Now p/w ADHF. Currently following w/ Dr. Shaik Thomson  - c/w diuresis w/ IV bumex 2 bid, goal 2-2.5L per day  - strict I/O, 1L fluid restriction, daily weights  - cards planning to reevaluate patient on monday 12/19 when off covid isolation for potential LHC  - appreciate cards recs daily

## 2022-12-16 NOTE — PROGRESS NOTE ADULT - ATTENDING COMMENTS
62 year old man, DM, HTN transferred from [facility] last month SOB and chest discomfort, elevated troponin, ADHF and NSTEMI. Successful diuresis and symptoms improved. Had no active chest pain, optimized volume status and managed RADHA on CKD - baseline 1.31 5/24/21. Planned coronary angiography once optimized. Off diuretic creatinine continued to gradually improve and as has had no symptoms in prolonged period of time, seemed best to defer angiography until renal function fully back to baseline. Discharged to home, but returned severely volume overloaded, thus once again seeking to optimize. Still testing positive for COVID. Anticipate will be out of isolation early next week and continuing efforts to optimize volume status, then can then proceed with coronary angiography.    To contact call Cardiology Fellow or Attending as listed on [email or web address] password: [email or web address].

## 2022-12-16 NOTE — PROGRESS NOTE ADULT - ATTENDING COMMENTS
62M former smoker w/ HFpEF (TTE 11/14/22 EF 57%), h/o NSTEMI, HTN, HLD, T2DM on insulin, p/w worsening SOB, leg/scrotal edema, c/f ADHF, found covid-19 positive.    #Acute on chronic systolic HF: (recent echo with EF 40-45%)   recent hosp ~ 1 month ago. not discharged home on diuretics due to renal dysfunction.   Cont with IV bumex 2mg BID. monitor StrictI+Os. daily weights. keep neg.   eventual plans for further ischemic w/u - possible cath.   #COVID+: clinically with some mild cough. no significant dyspnea not off supplement   will titrate off oxygen, cont to hold off treatment unless significant change  #RADHA on CKD3: resolved.   Cr  plateaued. will monitor while cont with diuresis.   strict I+Os, weight, Cr.    cont with diuresis with plan for cardiac cath next week if Cr stable and off COVID iso

## 2022-12-16 NOTE — PROGRESS NOTE ADULT - SUBJECTIVE AND OBJECTIVE BOX
Overnight Events: None     Review Of Systems: No chest pain, shortness of breath, or palpitations            Current Meds:  acetaminophen     Tablet .. 650 milliGRAM(s) Oral every 6 hours PRN  albuterol/ipratropium for Nebulization 3 milliLiter(s) Nebulizer every 6 hours PRN  aluminum hydroxide/magnesium hydroxide/simethicone Suspension 30 milliLiter(s) Oral every 4 hours PRN  aspirin enteric coated 81 milliGRAM(s) Oral daily  atorvastatin 80 milliGRAM(s) Oral at bedtime  buMETAnide Injectable 2 milliGRAM(s) IV Push every 12 hours  carvedilol 25 milliGRAM(s) Oral every 12 hours  clopidogrel Tablet 75 milliGRAM(s) Oral daily  clotrimazole/betamethasone Cream 1 Application(s) Topical two times a day  dextrose 5%. 1000 milliLiter(s) IV Continuous <Continuous>  dextrose 5%. 1000 milliLiter(s) IV Continuous <Continuous>  dextrose 50% Injectable 25 Gram(s) IV Push once  dextrose 50% Injectable 25 Gram(s) IV Push once  dextrose 50% Injectable 12.5 Gram(s) IV Push once  dextrose Oral Gel 15 Gram(s) Oral once PRN  gabapentin 100 milliGRAM(s) Oral daily  glucagon  Injectable 1 milliGRAM(s) IntraMuscular once  heparin   Injectable 5000 Unit(s) SubCutaneous every 8 hours  hydrALAZINE 100 milliGRAM(s) Oral every 8 hours  insulin glargine Injectable (LANTUS) 45 Unit(s) SubCutaneous at bedtime  insulin lispro (ADMELOG) corrective regimen sliding scale   SubCutaneous at bedtime  insulin lispro (ADMELOG) corrective regimen sliding scale   SubCutaneous three times a day before meals  insulin lispro Injectable (ADMELOG) 10 Unit(s) SubCutaneous three times a day before meals  melatonin 3 milliGRAM(s) Oral at bedtime PRN  NIFEdipine XL 60 milliGRAM(s) Oral every 24 hours  ondansetron Injectable 4 milliGRAM(s) IV Push every 8 hours PRN  sodium chloride 0.65% Nasal 1 Spray(s) Both Nostrils every 6 hours PRN  spironolactone 50 milliGRAM(s) Oral daily      Vitals:  T(F): 97.7 (12-16), Max: 98.2 (12-15)  HR: 63 (12-16) (61 - 65)  BP: 165/71 (12-16) (152/62 - 178/71)  RR: 18 (12-16)  SpO2: 92% (12-16)  I&O's Summary    15 Dec 2022 07:01  -  16 Dec 2022 07:00  --------------------------------------------------------  IN: 1080 mL / OUT: 2800 mL / NET: -1720 mL        Physical Exam:    Appearance: No acute distress  Eyes: pink conjunctiva  HEENT: Normal oral mucosa  Cardiovascular: RRR, S1, S2, systolic murmur, JVP difficult to assess due to body habitus, 2-3+ LE edema with what appear to be chronic LE wounds   Respiratory: Clear to auscultation bilaterally  Gastrointestinal: soft, non-tender, non-distended   Musculoskeletal: No clubbing; no joint deformity   Neurologic: Normal speech, no facial asymmetry  Psychiatry: AAOx3, mood & affect appropriate  Skin: No rashes, ecchymoses, or cyanosis of exposed skin                             9.5    8.45  )-----------( 248      ( 15 Dec 2022 06:21 )             30.5     12-16    139  |  103  |  58<H>  ----------------------------<  152<H>  4.7   |  21<L>  |  1.76<H>    Ca    8.8      16 Dec 2022 07:06  Phos  4.5     12-16  Mg     2.2     12-16    TPro  7.1  /  Alb  3.6  /  TBili  0.5  /  DBili  x   /  AST  32  /  ALT  39  /  AlkPhos  112  12-15    PT/INR - ( 16 Dec 2022 07:06 )   PT: 13.8 sec;   INR: 1.20 ratio         PTT - ( 16 Dec 2022 07:06 )  PTT:30.3 sec  CARDIAC MARKERS ( 09 Dec 2022 19:44 )  57 ng/L / x     / x     / 662 U/L / x     / 9.8 ng/mL  CARDIAC MARKERS ( 09 Dec 2022 17:49 )  65 ng/L / x     / x     / x     / x     / x          Serum Pro-Brain Natriuretic Peptide: 1651 pg/mL (12-12 @ 06:14)  Serum Pro-Brain Natriuretic Peptide: 1816 pg/mL (12-09 @ 17:49)      Interpretation of Telemetry: Sinus 60-70

## 2022-12-16 NOTE — PROGRESS NOTE ADULT - SUBJECTIVE AND OBJECTIVE BOX
Candida Castro MD  Internal Medicine, PGY-1        Patient Name: MARY ROBIN  Patient MRN: [id number]    Patient is a 62y old  Male who presents with a chief complaint of HF exacerbation (15 Dec 2022 08:45)      **SUBJECTIVE**    Last 24 hours: No acute events overnight. Continues to feel well. Breathing comfortably on RA. Endorses continued swelling but notes its improved since admission.     Review of Systems  Constitutional: No weakness, fevers, or chills  HEENT: No headache, visual changes, lightheadedness, or sore throat  Respiratory: No shortness of breath, cough, wheezing, or hemoptysis  Cardiovascular: No chest pain or palpitations  GI: No abdominal or epigastric pain. No nausea, vomiting, or change in bowel habits. No hematemesis or hematochezia  Neuro: No numbness or weakness  Skin: No itching or rashes      **OBJECTIVE**        VITALS:   T(C): 36.5 (12-16-22 @ 04:48), Max: 36.8 (12-15-22 @ 21:53)  HR: 63 (12-16-22 @ 04:48) (61 - 65)  BP: 165/71 (12-16-22 @ 04:48) (152/62 - 178/71)  RR: 18 (12-16-22 @ 04:48) (18 - 18)  SpO2: 92% (12-16-22 @ 04:48) (86% - 95%)    GENERAL: NAD, lying in bed comfortably  HEAD:  Normocephalic  EYES: Sclera clear  ENT: Moist mucous membranes  NECK: Supple, No JVD  CHEST/LUNG: Clear to auscultation bilaterally; No rales, rhonchi, wheezing, or rubs. Unlabored respirations  HEART: Regular rate and rhythm; No murmurs, rubs, or gallops  ABDOMEN: BSx4; Soft, nontender, nondistended  EXTREMITIES:  diffuse b/l LE edema, improved since admission, + scrotal swelling  NERVOUS SYSTEM:  A&Ox3, no focal deficits   SKIN: chronic venous stasis dermatitis b/l LE    Labs  12-15    138  |  104  |  59<H>  ----------------------------<  155<H>  4.7   |  23  |  1.83<H>    Ca    8.9      15 Dec 2022 06:21  Phos  4.6     12-15  Mg     2.4     12-15    TPro  7.1  /  Alb  3.6  /  TBili  0.5  /  DBili  x   /  AST  32  /  ALT  39  /  AlkPhos  112  12-15    CBC Full  -  ( 15 Dec 2022 06:21 )  WBC Count : 8.45 K/uL  RBC Count : 3.24 M/uL  Hemoglobin : 9.5 g/dL  Hematocrit : 30.5 %  Platelet Count - Automated : 248 K/uL  Mean Cell Volume : 94.1 fl  Mean Cell Hemoglobin : 29.3 pg  Mean Cell Hemoglobin Concentration : 31.1 gm/dL  Auto Neutrophil # : x  Auto Lymphocyte # : x  Auto Monocyte # : x  Auto Eosinophil # : x  Auto Basophil # : x  Auto Neutrophil % : x  Auto Lymphocyte % : x  Auto Monocyte % : x  Auto Eosinophil % : x  Auto Basophil % : x          PT/INR - ( 15 Dec 2022 06:21 )   PT: 14.6 sec;   INR: 1.26 ratio         PTT - ( 15 Dec 2022 06:21 )  PTT:30.9 sec    POC Glucose  CAPILLARY BLOOD GLUCOSE      POCT Blood Glucose.: 216 mg/dL (15 Dec 2022 21:36)  POCT Blood Glucose.: 138 mg/dL (15 Dec 2022 18:06)  POCT Blood Glucose.: 90 mg/dL (15 Dec 2022 17:11)  POCT Blood Glucose.: 100 mg/dL (15 Dec 2022 11:53)  POCT Blood Glucose.: 120 mg/dL (15 Dec 2022 08:09)      I&O's  I&O's Summary    14 Dec 2022 07:01  -  15 Dec 2022 07:00  --------------------------------------------------------  IN: 1020 mL / OUT: 2250 mL / NET: -1230 mL    15 Dec 2022 07:01  -  16 Dec 2022 06:58  --------------------------------------------------------  IN: 1080 mL / OUT: 2800 mL / NET: -1720 mL        UA (if applicable)      Micro (if applicable)      Imaging (if applicable)    Active Medications  MEDICATIONS  (STANDING):  aspirin enteric coated 81 milliGRAM(s) Oral daily  atorvastatin 80 milliGRAM(s) Oral at bedtime  buMETAnide Injectable 2 milliGRAM(s) IV Push every 12 hours  carvedilol 25 milliGRAM(s) Oral every 12 hours  clopidogrel Tablet 75 milliGRAM(s) Oral daily  clotrimazole/betamethasone Cream 1 Application(s) Topical two times a day  dextrose 5%. 1000 milliLiter(s) (50 mL/Hr) IV Continuous <Continuous>  dextrose 5%. 1000 milliLiter(s) (100 mL/Hr) IV Continuous <Continuous>  dextrose 50% Injectable 25 Gram(s) IV Push once  dextrose 50% Injectable 25 Gram(s) IV Push once  dextrose 50% Injectable 12.5 Gram(s) IV Push once  gabapentin 100 milliGRAM(s) Oral daily  glucagon  Injectable 1 milliGRAM(s) IntraMuscular once  heparin   Injectable 5000 Unit(s) SubCutaneous every 8 hours  hydrALAZINE 100 milliGRAM(s) Oral every 8 hours  insulin glargine Injectable (LANTUS) 45 Unit(s) SubCutaneous at bedtime  insulin lispro (ADMELOG) corrective regimen sliding scale   SubCutaneous at bedtime  insulin lispro (ADMELOG) corrective regimen sliding scale   SubCutaneous three times a day before meals  insulin lispro Injectable (ADMELOG) 10 Unit(s) SubCutaneous three times a day before meals  NIFEdipine XL 60 milliGRAM(s) Oral every 24 hours  spironolactone 50 milliGRAM(s) Oral daily    MEDICATIONS  (PRN):  acetaminophen     Tablet .. 650 milliGRAM(s) Oral every 6 hours PRN Temp greater or equal to 38C (100.4F), Mild Pain (1 - 3)  albuterol/ipratropium for Nebulization 3 milliLiter(s) Nebulizer every 6 hours PRN Shortness of Breath and/or Wheezing  aluminum hydroxide/magnesium hydroxide/simethicone Suspension 30 milliLiter(s) Oral every 4 hours PRN Dyspepsia  dextrose Oral Gel 15 Gram(s) Oral once PRN Blood Glucose LESS THAN 70 milliGRAM(s)/deciliter  melatonin 3 milliGRAM(s) Oral at bedtime PRN Insomnia  ondansetron Injectable 4 milliGRAM(s) IV Push every 8 hours PRN Nausea and/or Vomiting  sodium chloride 0.65% Nasal 1 Spray(s) Both Nostrils every 6 hours PRN congestion/dryness

## 2022-12-17 LAB
ALBUMIN SERPL ELPH-MCNC: 3.8 G/DL — SIGNIFICANT CHANGE UP (ref 3.3–5)
ALP SERPL-CCNC: 95 U/L — SIGNIFICANT CHANGE UP (ref 40–120)
ALT FLD-CCNC: 36 U/L — SIGNIFICANT CHANGE UP (ref 10–45)
ANION GAP SERPL CALC-SCNC: 12 MMOL/L — SIGNIFICANT CHANGE UP (ref 5–17)
AST SERPL-CCNC: 25 U/L — SIGNIFICANT CHANGE UP (ref 10–40)
BILIRUB SERPL-MCNC: 0.7 MG/DL — SIGNIFICANT CHANGE UP (ref 0.2–1.2)
BUN SERPL-MCNC: 59 MG/DL — HIGH (ref 7–23)
CALCIUM SERPL-MCNC: 9.2 MG/DL — SIGNIFICANT CHANGE UP (ref 8.4–10.5)
CHLORIDE SERPL-SCNC: 103 MMOL/L — SIGNIFICANT CHANGE UP (ref 96–108)
CO2 SERPL-SCNC: 23 MMOL/L — SIGNIFICANT CHANGE UP (ref 22–31)
CREAT SERPL-MCNC: 1.75 MG/DL — HIGH (ref 0.5–1.3)
EGFR: 43 ML/MIN/1.73M2 — LOW
GLUCOSE BLDC GLUCOMTR-MCNC: 124 MG/DL — HIGH (ref 70–99)
GLUCOSE BLDC GLUCOMTR-MCNC: 141 MG/DL — HIGH (ref 70–99)
GLUCOSE BLDC GLUCOMTR-MCNC: 166 MG/DL — HIGH (ref 70–99)
GLUCOSE BLDC GLUCOMTR-MCNC: 194 MG/DL — HIGH (ref 70–99)
GLUCOSE SERPL-MCNC: 157 MG/DL — HIGH (ref 70–99)
HCT VFR BLD CALC: 30.4 % — LOW (ref 39–50)
HGB BLD-MCNC: 9.4 G/DL — LOW (ref 13–17)
INR BLD: 1.27 RATIO — HIGH (ref 0.88–1.16)
MAGNESIUM SERPL-MCNC: 2.2 MG/DL — SIGNIFICANT CHANGE UP (ref 1.6–2.6)
MCHC RBC-ENTMCNC: 29.3 PG — SIGNIFICANT CHANGE UP (ref 27–34)
MCHC RBC-ENTMCNC: 30.9 GM/DL — LOW (ref 32–36)
MCV RBC AUTO: 94.7 FL — SIGNIFICANT CHANGE UP (ref 80–100)
NRBC # BLD: 0 /100 WBCS — SIGNIFICANT CHANGE UP (ref 0–0)
PHOSPHATE SERPL-MCNC: 4.1 MG/DL — SIGNIFICANT CHANGE UP (ref 2.5–4.5)
PLATELET # BLD AUTO: 241 K/UL — SIGNIFICANT CHANGE UP (ref 150–400)
POTASSIUM SERPL-MCNC: 4.5 MMOL/L — SIGNIFICANT CHANGE UP (ref 3.5–5.3)
POTASSIUM SERPL-SCNC: 4.5 MMOL/L — SIGNIFICANT CHANGE UP (ref 3.5–5.3)
PROT SERPL-MCNC: 7.2 G/DL — SIGNIFICANT CHANGE UP (ref 6–8.3)
PROTHROM AB SERPL-ACNC: 14.8 SEC — HIGH (ref 10.5–13.4)
RBC # BLD: 3.21 M/UL — LOW (ref 4.2–5.8)
RBC # FLD: 14 % — SIGNIFICANT CHANGE UP (ref 10.3–14.5)
SODIUM SERPL-SCNC: 138 MMOL/L — SIGNIFICANT CHANGE UP (ref 135–145)
WBC # BLD: 9.93 K/UL — SIGNIFICANT CHANGE UP (ref 3.8–10.5)
WBC # FLD AUTO: 9.93 K/UL — SIGNIFICANT CHANGE UP (ref 3.8–10.5)

## 2022-12-17 PROCEDURE — 99232 SBSQ HOSP IP/OBS MODERATE 35: CPT

## 2022-12-17 RX ORDER — HYDROMORPHONE HYDROCHLORIDE 2 MG/ML
0.5 INJECTION INTRAMUSCULAR; INTRAVENOUS; SUBCUTANEOUS ONCE
Refills: 0 | Status: DISCONTINUED | OUTPATIENT
Start: 2022-12-17 | End: 2022-12-17

## 2022-12-17 RX ADMIN — CARVEDILOL PHOSPHATE 25 MILLIGRAM(S): 80 CAPSULE, EXTENDED RELEASE ORAL at 08:14

## 2022-12-17 RX ADMIN — BUMETANIDE 2 MILLIGRAM(S): 0.25 INJECTION INTRAMUSCULAR; INTRAVENOUS at 08:14

## 2022-12-17 RX ADMIN — Medication 100 MILLIGRAM(S): at 08:14

## 2022-12-17 RX ADMIN — Medication 81 MILLIGRAM(S): at 12:10

## 2022-12-17 RX ADMIN — Medication 10 UNIT(S): at 13:20

## 2022-12-17 RX ADMIN — CLOPIDOGREL BISULFATE 75 MILLIGRAM(S): 75 TABLET, FILM COATED ORAL at 12:09

## 2022-12-17 RX ADMIN — ATORVASTATIN CALCIUM 80 MILLIGRAM(S): 80 TABLET, FILM COATED ORAL at 22:02

## 2022-12-17 RX ADMIN — INSULIN GLARGINE 45 UNIT(S): 100 INJECTION, SOLUTION SUBCUTANEOUS at 22:02

## 2022-12-17 RX ADMIN — Medication 650 MILLIGRAM(S): at 10:24

## 2022-12-17 RX ADMIN — Medication 10 UNIT(S): at 18:27

## 2022-12-17 RX ADMIN — SPIRONOLACTONE 50 MILLIGRAM(S): 25 TABLET, FILM COATED ORAL at 08:14

## 2022-12-17 RX ADMIN — Medication 100 MILLIGRAM(S): at 17:27

## 2022-12-17 RX ADMIN — CLOTRIMAZOLE AND BETAMETHASONE DIPROPIONATE 1 APPLICATION(S): 10; .5 CREAM TOPICAL at 18:28

## 2022-12-17 RX ADMIN — Medication 60 MILLIGRAM(S): at 12:09

## 2022-12-17 RX ADMIN — HYDROMORPHONE HYDROCHLORIDE 0.5 MILLIGRAM(S): 2 INJECTION INTRAMUSCULAR; INTRAVENOUS; SUBCUTANEOUS at 20:00

## 2022-12-17 RX ADMIN — Medication 650 MILLIGRAM(S): at 11:00

## 2022-12-17 RX ADMIN — HYDROMORPHONE HYDROCHLORIDE 0.5 MILLIGRAM(S): 2 INJECTION INTRAMUSCULAR; INTRAVENOUS; SUBCUTANEOUS at 19:35

## 2022-12-17 RX ADMIN — BUMETANIDE 2 MILLIGRAM(S): 0.25 INJECTION INTRAMUSCULAR; INTRAVENOUS at 18:28

## 2022-12-17 RX ADMIN — Medication 10 UNIT(S): at 09:03

## 2022-12-17 RX ADMIN — CARVEDILOL PHOSPHATE 25 MILLIGRAM(S): 80 CAPSULE, EXTENDED RELEASE ORAL at 18:27

## 2022-12-17 RX ADMIN — GABAPENTIN 100 MILLIGRAM(S): 400 CAPSULE ORAL at 12:10

## 2022-12-17 RX ADMIN — Medication 1: at 18:27

## 2022-12-17 RX ADMIN — HEPARIN SODIUM 5000 UNIT(S): 5000 INJECTION INTRAVENOUS; SUBCUTANEOUS at 22:03

## 2022-12-17 NOTE — PROGRESS NOTE ADULT - SUBJECTIVE AND OBJECTIVE BOX
PROGRESS NOTE:     Patient is a 62y old  Male who presents with a chief complaint of HF exacerbation (16 Dec 2022 08:17)      SUBJECTIVE / OVERNIGHT EVENTS:  No acute events overnight.    MEDICATIONS  (STANDING):  aspirin enteric coated 81 milliGRAM(s) Oral daily  atorvastatin 80 milliGRAM(s) Oral at bedtime  buMETAnide Injectable 2 milliGRAM(s) IV Push every 12 hours  carvedilol 25 milliGRAM(s) Oral every 12 hours  clopidogrel Tablet 75 milliGRAM(s) Oral daily  clotrimazole/betamethasone Cream 1 Application(s) Topical two times a day  dextrose 5%. 1000 milliLiter(s) (50 mL/Hr) IV Continuous <Continuous>  dextrose 5%. 1000 milliLiter(s) (100 mL/Hr) IV Continuous <Continuous>  dextrose 50% Injectable 25 Gram(s) IV Push once  dextrose 50% Injectable 12.5 Gram(s) IV Push once  dextrose 50% Injectable 25 Gram(s) IV Push once  gabapentin 100 milliGRAM(s) Oral daily  glucagon  Injectable 1 milliGRAM(s) IntraMuscular once  heparin   Injectable 5000 Unit(s) SubCutaneous every 8 hours  hydrALAZINE 100 milliGRAM(s) Oral every 8 hours  insulin glargine Injectable (LANTUS) 45 Unit(s) SubCutaneous at bedtime  insulin lispro (ADMELOG) corrective regimen sliding scale   SubCutaneous at bedtime  insulin lispro (ADMELOG) corrective regimen sliding scale   SubCutaneous three times a day before meals  insulin lispro Injectable (ADMELOG) 10 Unit(s) SubCutaneous three times a day before meals  NIFEdipine XL 60 milliGRAM(s) Oral every 24 hours  spironolactone 50 milliGRAM(s) Oral daily    MEDICATIONS  (PRN):  acetaminophen     Tablet .. 650 milliGRAM(s) Oral every 6 hours PRN Temp greater or equal to 38C (100.4F), Mild Pain (1 - 3)  albuterol/ipratropium for Nebulization 3 milliLiter(s) Nebulizer every 6 hours PRN Shortness of Breath and/or Wheezing  aluminum hydroxide/magnesium hydroxide/simethicone Suspension 30 milliLiter(s) Oral every 4 hours PRN Dyspepsia  dextrose Oral Gel 15 Gram(s) Oral once PRN Blood Glucose LESS THAN 70 milliGRAM(s)/deciliter  melatonin 3 milliGRAM(s) Oral at bedtime PRN Insomnia  ondansetron Injectable 4 milliGRAM(s) IV Push every 8 hours PRN Nausea and/or Vomiting  sodium chloride 0.65% Nasal 1 Spray(s) Both Nostrils every 6 hours PRN congestion/dryness      CAPILLARY BLOOD GLUCOSE      POCT Blood Glucose.: 203 mg/dL (16 Dec 2022 21:47)  POCT Blood Glucose.: 132 mg/dL (16 Dec 2022 17:13)  POCT Blood Glucose.: 162 mg/dL (16 Dec 2022 11:43)  POCT Blood Glucose.: 138 mg/dL (16 Dec 2022 08:10)    I&O's Summary    16 Dec 2022 07:01  -  17 Dec 2022 07:00  --------------------------------------------------------  IN: 240 mL / OUT: 2650 mL / NET: -2410 mL        PHYSICAL EXAM:  Vital Signs Last 24 Hrs  T(C): 36.8 (16 Dec 2022 20:44), Max: 36.8 (16 Dec 2022 20:44)  T(F): 98.3 (16 Dec 2022 20:44), Max: 98.3 (16 Dec 2022 20:44)  HR: 63 (16 Dec 2022 20:44) (60 - 63)  BP: 160/60 (16 Dec 2022 20:44) (139/63 - 160/60)  BP(mean): --  RR: 18 (16 Dec 2022 20:44) (18 - 18)  SpO2: 95% (16 Dec 2022 20:44) (86% - 95%)    Parameters below as of 16 Dec 2022 20:44  Patient On (Oxygen Delivery Method): nasal cannula  O2 Flow (L/min): 2      GENERAL: NAD, lying in bed comfortably  HEAD:  Normocephalic  EYES: Sclera clear  ENT: Moist mucous membranes  NECK: Supple, No JVD  CHEST/LUNG: Clear to auscultation bilaterally; No rales, rhonchi, wheezing, or rubs. Unlabored respirations  HEART: Regular rate and rhythm; No murmurs, rubs, or gallops  ABDOMEN: BSx4; Soft, nontender, nondistended  EXTREMITIES:  diffuse b/l LE edema, improved since admission, + scrotal swelling  NERVOUS SYSTEM:  A&Ox3, no focal deficits   SKIN: chronic venous stasis dermatitis b/l LE    LABS:                        9.7    8.66  )-----------( 228      ( 16 Dec 2022 07:06 )             31.2     12-16    139  |  103  |  58<H>  ----------------------------<  152<H>  4.7   |  21<L>  |  1.76<H>    Ca    8.8      16 Dec 2022 07:06  Phos  4.5     12-16  Mg     2.2     12-16      PT/INR - ( 16 Dec 2022 07:06 )   PT: 13.8 sec;   INR: 1.20 ratio         PTT - ( 16 Dec 2022 07:06 )  PTT:30.3 sec            RADIOLOGY & ADDITIONAL TESTS:  Results Reviewed:   Imaging Personally Reviewed:  Electrocardiogram Personally Reviewed:    COORDINATION OF CARE:  Care Discussed with Consultants/Other Providers [Y/N]:  Prior or Outpatient Records Reviewed [Y/N]:

## 2022-12-17 NOTE — PROGRESS NOTE ADULT - ATTENDING COMMENTS
62M former smoker w/ HFpEF (TTE 11/14/22 EF 57%), h/o NSTEMI, HTN, HLD, T2DM on insulin, p/w worsening SOB, leg/scrotal edema, c/f ADHF, found covid-19 positive.    #Acute on chronic systolic HF: (recent echo with EF 40-45%)   recent hosp ~ 1 month ago. not discharged home on diuretics due to renal dysfunction.   Cont with IV bumex 2mg BID. monitor StrictI+Os. daily weights. keep neg.   eventual plans for further ischemic w/u - possible cath once euvolemic  #COVID+: 95% on 2L-- will attempt to wean off, however if patient continues to require o2 I would recommend initiating dex 6 mg daily x 10 days and remdesevir (cautiously given RADHA)    Charo Max D.O.  available on MS teams

## 2022-12-18 LAB
GLUCOSE BLDC GLUCOMTR-MCNC: 147 MG/DL — HIGH (ref 70–99)
GLUCOSE BLDC GLUCOMTR-MCNC: 154 MG/DL — HIGH (ref 70–99)
GLUCOSE BLDC GLUCOMTR-MCNC: 186 MG/DL — HIGH (ref 70–99)
GLUCOSE BLDC GLUCOMTR-MCNC: 94 MG/DL — SIGNIFICANT CHANGE UP (ref 70–99)
HBV SURFACE AB SER-ACNC: SIGNIFICANT CHANGE UP

## 2022-12-18 PROCEDURE — 99232 SBSQ HOSP IP/OBS MODERATE 35: CPT | Mod: GC

## 2022-12-18 RX ORDER — SENNA PLUS 8.6 MG/1
2 TABLET ORAL AT BEDTIME
Refills: 0 | Status: DISCONTINUED | OUTPATIENT
Start: 2022-12-18 | End: 2022-12-23

## 2022-12-18 RX ORDER — POLYETHYLENE GLYCOL 3350 17 G/17G
17 POWDER, FOR SOLUTION ORAL
Refills: 0 | Status: DISCONTINUED | OUTPATIENT
Start: 2022-12-18 | End: 2022-12-23

## 2022-12-18 RX ADMIN — GABAPENTIN 100 MILLIGRAM(S): 400 CAPSULE ORAL at 12:35

## 2022-12-18 RX ADMIN — CARVEDILOL PHOSPHATE 25 MILLIGRAM(S): 80 CAPSULE, EXTENDED RELEASE ORAL at 18:19

## 2022-12-18 RX ADMIN — Medication 650 MILLIGRAM(S): at 01:00

## 2022-12-18 RX ADMIN — CARVEDILOL PHOSPHATE 25 MILLIGRAM(S): 80 CAPSULE, EXTENDED RELEASE ORAL at 05:21

## 2022-12-18 RX ADMIN — HEPARIN SODIUM 5000 UNIT(S): 5000 INJECTION INTRAVENOUS; SUBCUTANEOUS at 14:20

## 2022-12-18 RX ADMIN — HEPARIN SODIUM 5000 UNIT(S): 5000 INJECTION INTRAVENOUS; SUBCUTANEOUS at 21:58

## 2022-12-18 RX ADMIN — HEPARIN SODIUM 5000 UNIT(S): 5000 INJECTION INTRAVENOUS; SUBCUTANEOUS at 05:20

## 2022-12-18 RX ADMIN — INSULIN GLARGINE 45 UNIT(S): 100 INJECTION, SOLUTION SUBCUTANEOUS at 21:58

## 2022-12-18 RX ADMIN — BUMETANIDE 2 MILLIGRAM(S): 0.25 INJECTION INTRAMUSCULAR; INTRAVENOUS at 18:19

## 2022-12-18 RX ADMIN — Medication 100 MILLIGRAM(S): at 14:20

## 2022-12-18 RX ADMIN — ATORVASTATIN CALCIUM 80 MILLIGRAM(S): 80 TABLET, FILM COATED ORAL at 21:59

## 2022-12-18 RX ADMIN — Medication 10 UNIT(S): at 18:21

## 2022-12-18 RX ADMIN — SPIRONOLACTONE 50 MILLIGRAM(S): 25 TABLET, FILM COATED ORAL at 05:21

## 2022-12-18 RX ADMIN — Medication 10 UNIT(S): at 09:07

## 2022-12-18 RX ADMIN — Medication 650 MILLIGRAM(S): at 20:27

## 2022-12-18 RX ADMIN — Medication 650 MILLIGRAM(S): at 21:00

## 2022-12-18 RX ADMIN — Medication 81 MILLIGRAM(S): at 12:34

## 2022-12-18 RX ADMIN — Medication 100 MILLIGRAM(S): at 00:07

## 2022-12-18 RX ADMIN — BUMETANIDE 2 MILLIGRAM(S): 0.25 INJECTION INTRAMUSCULAR; INTRAVENOUS at 05:22

## 2022-12-18 RX ADMIN — Medication 10 UNIT(S): at 14:35

## 2022-12-18 RX ADMIN — Medication 5 MILLIGRAM(S): at 12:34

## 2022-12-18 RX ADMIN — CLOPIDOGREL BISULFATE 75 MILLIGRAM(S): 75 TABLET, FILM COATED ORAL at 12:34

## 2022-12-18 RX ADMIN — Medication 100 MILLIGRAM(S): at 05:21

## 2022-12-18 RX ADMIN — Medication 650 MILLIGRAM(S): at 00:05

## 2022-12-18 RX ADMIN — CLOTRIMAZOLE AND BETAMETHASONE DIPROPIONATE 1 APPLICATION(S): 10; .5 CREAM TOPICAL at 22:18

## 2022-12-18 RX ADMIN — POLYETHYLENE GLYCOL 3350 17 GRAM(S): 17 POWDER, FOR SOLUTION ORAL at 12:34

## 2022-12-18 RX ADMIN — Medication 1: at 18:22

## 2022-12-18 RX ADMIN — Medication 60 MILLIGRAM(S): at 05:21

## 2022-12-18 NOTE — PROGRESS NOTE ADULT - ATTENDING COMMENTS
62M former smoker w/ HFpEF (TTE 11/14/22 EF 57%), h/o NSTEMI, HTN, HLD, T2DM on insulin, p/w worsening SOB, leg/scrotal edema, c/f ADHF, found covid-19 positive.    #Acute on chronic systolic HF: (recent echo with EF 40-45%)   recent hosp ~ 1 month ago. not discharged home on diuretics due to renal dysfunction.   Cont with IV bumex 2mg BID. monitor Strict I+Os. daily weights. keep neg.   eventual plans for further ischemic w/u - possible cath once euvolemic  #COVID+: 95% on 2L- o2 needs likely 2' chf as opposed to infection; no need for dex and remdesevir at this point  - last day of isolation is 12/18     Charo Max D.O.  available on MS teams .

## 2022-12-18 NOTE — PROGRESS NOTE ADULT - SUBJECTIVE AND OBJECTIVE BOX
Candida Castro MD  Internal Medicine, PGY-1        Patient Name: MARY ROBIN  Patient MRN: [id number]    Patient is a 62y old  Male who presents with a chief complaint of HF exacerbation (17 Dec 2022 07:24)      **SUBJECTIVE**    Last 24 hours: No acute events overnight. Patient continues to feel well. Pending off isolation for possible LHC this week.     Review of Systems  Constitutional: No weakness, fevers, or chills  HEENT: No headache, visual changes, lightheadedness, or sore throat  Respiratory: No shortness of breath, cough, wheezing, or hemoptysis  Cardiovascular: No chest pain or palpitations  GI: No abdominal or epigastric pain. No nausea, vomiting, or change in bowel habits. No hematemesis or hematochezia  Neuro: No numbness or weakness  Skin: No itching or rashes      **OBJECTIVE**        VITALS:   T(C): 36.9 (12-17-22 @ 19:48), Max: 36.9 (12-17-22 @ 19:48)  HR: 63 (12-18-22 @ 00:07) (62 - 64)  BP: 158/64 (12-18-22 @ 00:07) (143/55 - 164/68)  RR: 19 (12-17-22 @ 19:48) (18 - 19)  SpO2: 93% (12-17-22 @ 19:48) (93% - 95%)    GENERAL: NAD, lying in bed comfortably  HEAD:  Normocephalic  EYES: Sclera clear  ENT: Moist mucous membranes  NECK: Supple, No JVD  CHEST/LUNG: Clear to auscultation bilaterally; No rales, rhonchi, wheezing, or rubs. Unlabored respirations  HEART: Regular rate and rhythm; No murmurs, rubs, or gallops  ABDOMEN: BSx4; Soft, nontender, nondistended  EXTREMITIES:  significant b/l LE edema, + scrotal edema  NERVOUS SYSTEM:  A&Ox3, no focal deficits   SKIN: chronic venous stasis dermatitis on b/l LE    Labs  12-17    138  |  103  |  59<H>  ----------------------------<  157<H>  4.5   |  23  |  1.75<H>    Ca    9.2      17 Dec 2022 11:24  Phos  4.1     12-17  Mg     2.2     12-17    TPro  7.2  /  Alb  3.8  /  TBili  0.7  /  DBili  x   /  AST  25  /  ALT  36  /  AlkPhos  95  12-17    CBC Full  -  ( 17 Dec 2022 11:24 )  WBC Count : 9.93 K/uL  RBC Count : 3.21 M/uL  Hemoglobin : 9.4 g/dL  Hematocrit : 30.4 %  Platelet Count - Automated : 241 K/uL  Mean Cell Volume : 94.7 fl  Mean Cell Hemoglobin : 29.3 pg  Mean Cell Hemoglobin Concentration : 30.9 gm/dL  Auto Neutrophil # : x  Auto Lymphocyte # : x  Auto Monocyte # : x  Auto Eosinophil # : x  Auto Basophil # : x  Auto Neutrophil % : x  Auto Lymphocyte % : x  Auto Monocyte % : x  Auto Eosinophil % : x  Auto Basophil % : x          PT/INR - ( 17 Dec 2022 11:24 )   PT: 14.8 sec;   INR: 1.27 ratio         PTT - ( 16 Dec 2022 07:06 )  PTT:30.3 sec    POC Glucose  CAPILLARY BLOOD GLUCOSE      POCT Blood Glucose.: 194 mg/dL (17 Dec 2022 21:52)  POCT Blood Glucose.: 166 mg/dL (17 Dec 2022 17:23)  POCT Blood Glucose.: 124 mg/dL (17 Dec 2022 13:19)  POCT Blood Glucose.: 141 mg/dL (17 Dec 2022 08:32)      I&O's  I&O's Summary    16 Dec 2022 07:01  -  17 Dec 2022 07:00  --------------------------------------------------------  IN: 240 mL / OUT: 2650 mL / NET: -2410 mL    17 Dec 2022 07:01  -  18 Dec 2022 06:55  --------------------------------------------------------  IN: 0 mL / OUT: 1750 mL / NET: -1750 mL        UA (if applicable)      Micro (if applicable)      Imaging (if applicable)    Active Medications  MEDICATIONS  (STANDING):  aspirin enteric coated 81 milliGRAM(s) Oral daily  atorvastatin 80 milliGRAM(s) Oral at bedtime  buMETAnide Injectable 2 milliGRAM(s) IV Push every 12 hours  carvedilol 25 milliGRAM(s) Oral every 12 hours  clopidogrel Tablet 75 milliGRAM(s) Oral daily  clotrimazole/betamethasone Cream 1 Application(s) Topical two times a day  dextrose 5%. 1000 milliLiter(s) (50 mL/Hr) IV Continuous <Continuous>  dextrose 5%. 1000 milliLiter(s) (100 mL/Hr) IV Continuous <Continuous>  dextrose 50% Injectable 25 Gram(s) IV Push once  dextrose 50% Injectable 25 Gram(s) IV Push once  dextrose 50% Injectable 12.5 Gram(s) IV Push once  gabapentin 100 milliGRAM(s) Oral daily  glucagon  Injectable 1 milliGRAM(s) IntraMuscular once  heparin   Injectable 5000 Unit(s) SubCutaneous every 8 hours  hydrALAZINE 100 milliGRAM(s) Oral every 8 hours  insulin glargine Injectable (LANTUS) 45 Unit(s) SubCutaneous at bedtime  insulin lispro (ADMELOG) corrective regimen sliding scale   SubCutaneous at bedtime  insulin lispro (ADMELOG) corrective regimen sliding scale   SubCutaneous three times a day before meals  insulin lispro Injectable (ADMELOG) 10 Unit(s) SubCutaneous three times a day before meals  NIFEdipine XL 60 milliGRAM(s) Oral every 24 hours  spironolactone 50 milliGRAM(s) Oral daily    MEDICATIONS  (PRN):  acetaminophen     Tablet .. 650 milliGRAM(s) Oral every 6 hours PRN Temp greater or equal to 38C (100.4F), Mild Pain (1 - 3)  albuterol/ipratropium for Nebulization 3 milliLiter(s) Nebulizer every 6 hours PRN Shortness of Breath and/or Wheezing  aluminum hydroxide/magnesium hydroxide/simethicone Suspension 30 milliLiter(s) Oral every 4 hours PRN Dyspepsia  dextrose Oral Gel 15 Gram(s) Oral once PRN Blood Glucose LESS THAN 70 milliGRAM(s)/deciliter  melatonin 3 milliGRAM(s) Oral at bedtime PRN Insomnia  ondansetron Injectable 4 milliGRAM(s) IV Push every 8 hours PRN Nausea and/or Vomiting  sodium chloride 0.65% Nasal 1 Spray(s) Both Nostrils every 6 hours PRN congestion/dryness

## 2022-12-19 LAB
ALBUMIN SERPL ELPH-MCNC: 3.8 G/DL — SIGNIFICANT CHANGE UP (ref 3.3–5)
ALP SERPL-CCNC: 89 U/L — SIGNIFICANT CHANGE UP (ref 40–120)
ALT FLD-CCNC: 35 U/L — SIGNIFICANT CHANGE UP (ref 10–45)
ANION GAP SERPL CALC-SCNC: 14 MMOL/L — SIGNIFICANT CHANGE UP (ref 5–17)
APPEARANCE UR: CLEAR — SIGNIFICANT CHANGE UP
APTT BLD: 30.6 SEC — SIGNIFICANT CHANGE UP (ref 27.5–35.5)
AST SERPL-CCNC: 31 U/L — SIGNIFICANT CHANGE UP (ref 10–40)
BACTERIA # UR AUTO: NEGATIVE — SIGNIFICANT CHANGE UP
BILIRUB SERPL-MCNC: 0.6 MG/DL — SIGNIFICANT CHANGE UP (ref 0.2–1.2)
BILIRUB UR-MCNC: NEGATIVE — SIGNIFICANT CHANGE UP
BUN SERPL-MCNC: 62 MG/DL — HIGH (ref 7–23)
CALCIUM SERPL-MCNC: 9.3 MG/DL — SIGNIFICANT CHANGE UP (ref 8.4–10.5)
CHLORIDE SERPL-SCNC: 101 MMOL/L — SIGNIFICANT CHANGE UP (ref 96–108)
CO2 SERPL-SCNC: 24 MMOL/L — SIGNIFICANT CHANGE UP (ref 22–31)
COLOR SPEC: SIGNIFICANT CHANGE UP
CREAT ?TM UR-MCNC: 55 MG/DL — SIGNIFICANT CHANGE UP
CREAT SERPL-MCNC: 2.02 MG/DL — HIGH (ref 0.5–1.3)
DIFF PNL FLD: NEGATIVE — SIGNIFICANT CHANGE UP
EGFR: 36 ML/MIN/1.73M2 — LOW
EPI CELLS # UR: 0 /HPF — SIGNIFICANT CHANGE UP
GLUCOSE BLDC GLUCOMTR-MCNC: 119 MG/DL — HIGH (ref 70–99)
GLUCOSE BLDC GLUCOMTR-MCNC: 169 MG/DL — HIGH (ref 70–99)
GLUCOSE BLDC GLUCOMTR-MCNC: 195 MG/DL — HIGH (ref 70–99)
GLUCOSE BLDC GLUCOMTR-MCNC: 246 MG/DL — HIGH (ref 70–99)
GLUCOSE BLDC GLUCOMTR-MCNC: 53 MG/DL — CRITICAL LOW (ref 70–99)
GLUCOSE BLDC GLUCOMTR-MCNC: 53 MG/DL — CRITICAL LOW (ref 70–99)
GLUCOSE BLDC GLUCOMTR-MCNC: 63 MG/DL — LOW (ref 70–99)
GLUCOSE BLDC GLUCOMTR-MCNC: 63 MG/DL — LOW (ref 70–99)
GLUCOSE BLDC GLUCOMTR-MCNC: 97 MG/DL — SIGNIFICANT CHANGE UP (ref 70–99)
GLUCOSE SERPL-MCNC: 89 MG/DL — SIGNIFICANT CHANGE UP (ref 70–99)
GLUCOSE UR QL: NEGATIVE — SIGNIFICANT CHANGE UP
HCT VFR BLD CALC: 29.8 % — LOW (ref 39–50)
HGB BLD-MCNC: 9.3 G/DL — LOW (ref 13–17)
HYALINE CASTS # UR AUTO: 1 /LPF — SIGNIFICANT CHANGE UP (ref 0–2)
INR BLD: 1.23 RATIO — HIGH (ref 0.88–1.16)
KAPPA LC SER QL IFE: 10.41 MG/DL — HIGH (ref 0.33–1.94)
KAPPA/LAMBDA FREE LIGHT CHAIN RATIO, SERUM: 2.37 RATIO — HIGH (ref 0.26–1.65)
KETONES UR-MCNC: NEGATIVE — SIGNIFICANT CHANGE UP
LAMBDA LC SER QL IFE: 4.4 MG/DL — HIGH (ref 0.57–2.63)
LEUKOCYTE ESTERASE UR-ACNC: NEGATIVE — SIGNIFICANT CHANGE UP
MAGNESIUM SERPL-MCNC: 2.3 MG/DL — SIGNIFICANT CHANGE UP (ref 1.6–2.6)
MCHC RBC-ENTMCNC: 29.3 PG — SIGNIFICANT CHANGE UP (ref 27–34)
MCHC RBC-ENTMCNC: 31.2 GM/DL — LOW (ref 32–36)
MCV RBC AUTO: 94 FL — SIGNIFICANT CHANGE UP (ref 80–100)
NITRITE UR-MCNC: NEGATIVE — SIGNIFICANT CHANGE UP
NRBC # BLD: 0 /100 WBCS — SIGNIFICANT CHANGE UP (ref 0–0)
OSMOLALITY UR: 389 MOS/KG — SIGNIFICANT CHANGE UP (ref 300–900)
PH UR: 5.5 — SIGNIFICANT CHANGE UP (ref 5–8)
PHOSPHATE SERPL-MCNC: 5.2 MG/DL — HIGH (ref 2.5–4.5)
PLATELET # BLD AUTO: 235 K/UL — SIGNIFICANT CHANGE UP (ref 150–400)
POTASSIUM SERPL-MCNC: 4.5 MMOL/L — SIGNIFICANT CHANGE UP (ref 3.5–5.3)
POTASSIUM SERPL-SCNC: 4.5 MMOL/L — SIGNIFICANT CHANGE UP (ref 3.5–5.3)
POTASSIUM UR-SCNC: 27 MMOL/L — SIGNIFICANT CHANGE UP
PROT ?TM UR-MCNC: 58 MG/DL — HIGH (ref 0–12)
PROT SERPL-MCNC: 7.1 G/DL — SIGNIFICANT CHANGE UP (ref 6–8.3)
PROT UR-MCNC: ABNORMAL
PROT/CREAT UR-RTO: 1.1 RATIO — HIGH (ref 0–0.2)
PROTHROM AB SERPL-ACNC: 14.3 SEC — HIGH (ref 10.5–13.4)
RBC # BLD: 3.17 M/UL — LOW (ref 4.2–5.8)
RBC # FLD: 14.2 % — SIGNIFICANT CHANGE UP (ref 10.3–14.5)
RBC CASTS # UR COMP ASSIST: 1 /HPF — SIGNIFICANT CHANGE UP (ref 0–4)
SODIUM SERPL-SCNC: 139 MMOL/L — SIGNIFICANT CHANGE UP (ref 135–145)
SODIUM UR-SCNC: 67 MMOL/L — SIGNIFICANT CHANGE UP
SP GR SPEC: 1.01 — SIGNIFICANT CHANGE UP (ref 1.01–1.02)
UROBILINOGEN FLD QL: NEGATIVE — SIGNIFICANT CHANGE UP
UUN UR-MCNC: 562 MG/DL — SIGNIFICANT CHANGE UP
WBC # BLD: 10.02 K/UL — SIGNIFICANT CHANGE UP (ref 3.8–10.5)
WBC # FLD AUTO: 10.02 K/UL — SIGNIFICANT CHANGE UP (ref 3.8–10.5)
WBC UR QL: 0 /HPF — SIGNIFICANT CHANGE UP (ref 0–5)

## 2022-12-19 PROCEDURE — 99232 SBSQ HOSP IP/OBS MODERATE 35: CPT

## 2022-12-19 PROCEDURE — 99233 SBSQ HOSP IP/OBS HIGH 50: CPT | Mod: GC

## 2022-12-19 PROCEDURE — 99233 SBSQ HOSP IP/OBS HIGH 50: CPT

## 2022-12-19 RX ORDER — INSULIN GLARGINE 100 [IU]/ML
35 INJECTION, SOLUTION SUBCUTANEOUS AT BEDTIME
Refills: 0 | Status: DISCONTINUED | OUTPATIENT
Start: 2022-12-19 | End: 2022-12-21

## 2022-12-19 RX ADMIN — CARVEDILOL PHOSPHATE 25 MILLIGRAM(S): 80 CAPSULE, EXTENDED RELEASE ORAL at 17:26

## 2022-12-19 RX ADMIN — SPIRONOLACTONE 50 MILLIGRAM(S): 25 TABLET, FILM COATED ORAL at 06:19

## 2022-12-19 RX ADMIN — Medication 10 UNIT(S): at 17:23

## 2022-12-19 RX ADMIN — GABAPENTIN 100 MILLIGRAM(S): 400 CAPSULE ORAL at 13:28

## 2022-12-19 RX ADMIN — INSULIN GLARGINE 35 UNIT(S): 100 INJECTION, SOLUTION SUBCUTANEOUS at 22:08

## 2022-12-19 RX ADMIN — Medication 650 MILLIGRAM(S): at 21:29

## 2022-12-19 RX ADMIN — BUMETANIDE 2 MILLIGRAM(S): 0.25 INJECTION INTRAMUSCULAR; INTRAVENOUS at 06:19

## 2022-12-19 RX ADMIN — HEPARIN SODIUM 5000 UNIT(S): 5000 INJECTION INTRAVENOUS; SUBCUTANEOUS at 21:31

## 2022-12-19 RX ADMIN — Medication 10 UNIT(S): at 13:29

## 2022-12-19 RX ADMIN — Medication 650 MILLIGRAM(S): at 03:39

## 2022-12-19 RX ADMIN — Medication 650 MILLIGRAM(S): at 03:13

## 2022-12-19 RX ADMIN — ATORVASTATIN CALCIUM 80 MILLIGRAM(S): 80 TABLET, FILM COATED ORAL at 21:30

## 2022-12-19 RX ADMIN — Medication 1: at 17:24

## 2022-12-19 RX ADMIN — Medication 2: at 13:29

## 2022-12-19 RX ADMIN — Medication 81 MILLIGRAM(S): at 13:28

## 2022-12-19 RX ADMIN — Medication 0: at 22:07

## 2022-12-19 RX ADMIN — Medication 60 MILLIGRAM(S): at 06:19

## 2022-12-19 RX ADMIN — BUMETANIDE 2 MILLIGRAM(S): 0.25 INJECTION INTRAMUSCULAR; INTRAVENOUS at 17:29

## 2022-12-19 RX ADMIN — HEPARIN SODIUM 5000 UNIT(S): 5000 INJECTION INTRAVENOUS; SUBCUTANEOUS at 06:18

## 2022-12-19 RX ADMIN — Medication 650 MILLIGRAM(S): at 22:25

## 2022-12-19 RX ADMIN — Medication 100 MILLIGRAM(S): at 21:30

## 2022-12-19 RX ADMIN — CLOTRIMAZOLE AND BETAMETHASONE DIPROPIONATE 1 APPLICATION(S): 10; .5 CREAM TOPICAL at 17:25

## 2022-12-19 RX ADMIN — HEPARIN SODIUM 5000 UNIT(S): 5000 INJECTION INTRAVENOUS; SUBCUTANEOUS at 13:31

## 2022-12-19 RX ADMIN — CLOPIDOGREL BISULFATE 75 MILLIGRAM(S): 75 TABLET, FILM COATED ORAL at 13:28

## 2022-12-19 RX ADMIN — CLOTRIMAZOLE AND BETAMETHASONE DIPROPIONATE 1 APPLICATION(S): 10; .5 CREAM TOPICAL at 06:20

## 2022-12-19 NOTE — PROGRESS NOTE ADULT - ATTENDING COMMENTS
CKD III (SCr 1.8-2, GFR 40s) with proteinuria 1 gram  HFpEF, CAD  Hypervolemia    I see no reason not to initiate SGLT2i (Farxiga/dapa 10mg daily) during current admission for cardio-renal protection. Also favor tapering off hydralazine, as has no specific indication and is riddled with side effects (not to mention adherence with challenges of a TID drug). He should be on a long-acting ARB, such as telmisartan, irbesartan, or olmesartan.      - Please check urine albumin/creatinine ratio  - Continue diuresis  - SGLT2i  - Continue Carvedilol 25mg BID  - Continue Spironolactone 50mg daily  - Favor starting ARB (after PCI)  - Favor tapering off hydralazine  - Nifedipine XL 60mg daily >> would eventually switch to amlodipine as longer acting  - Atorvastatin  - Daily weight on standing scale  - Outpatient nephrology follow up  - Remainder per fellow, will follow

## 2022-12-19 NOTE — PROGRESS NOTE ADULT - ASSESSMENT
62M former smoker w/ HFpEF (TTE 11/14/22 EF 57%), h/o NSTEMI, HTN, HLD, T2DM on insulin, p/w worsening SOB, leg/scrotal edema, c/f ADHF, found covid-19 positive.    Wound Consult requested to assist w/ management of BLE PVD w/ stasis dermatitis  BLE (fungal) cellulitis     BLE - CHG4% wash w/ Adaptic + ABD pads QD  Consider MAR/PVR, XRay  BLE Duplex neg for  DVT   BLE elevation & Compression  Abx per Medicine- Consider antifungal  Moisturize intact skin w/ SWEEN cream BID  Nutrition Consult for optimization        encourage high quality protein, MVI & Vit C to promote wound healing  Hyperglycemia -  ADA diet and Lantus, FS w/ ISS  Continue turning and positioning w/ offloading assistive devices as per protocol  Buttocks/ Sacrum Rodriguez BID and prn soiling        Continue w/ attends under pads and Pericare as per protocol  Waffle Cushion to chair when oob to chair  Continue w/ low air loss pressure redistribution bed surface   Care as per medicine, will follow w/ you  Upon discharge f/u as outpatient at [facility] [address] [place], [place] [id number]  Seen w/ RN and D/w team & attng  Thank you for this consult  Michelle Bruno PA-C [id number]  I spent 25minutes face to face w/ this pt of which more than 50% of the time was spent counseling & coordinating care of this pt.

## 2022-12-19 NOTE — PROGRESS NOTE ADULT - PROBLEM SELECTOR PLAN 1
Pt with RADHA in setting of MI and CHF. Pt gives no prior hx of kidney problems. Pt tested positive for COVID 19 on admission. Pt was admitted with significant volume overload on admission. Scr was elevated at 1.96 on 11/11/22 and  was at 2.06 on 11/17/22. SCr was elevated at 1.68 on admission (12/10/22). Now elevated to 2.0 today. UA showed proteinuria quantified at 1gm. US kidney/bladder done on 11/11/22 showed no hydronephrosis. Pt likely with CKD, exact etiology of CKD remains unclear. Pt currently planned for PCI. Davis risk score: 26.1% post-PCI ANJUM. Hep BsAg and Hep C Ab, serum free light chain ratio and SIFE all negative for acute pathology. Continue with diuretics Bumex 2 mg IV q12 for fluid overload. Consider holding diuretics on day of PCI. Avoid nephrotoxins. Daily weight. Dose meds as per egfr. Bladder scan PRN    If you have any questions, please feel free to contact me  Gurpreet Velasquez  Nephrology Fellow  [phone]; Prefer [employer] TEAMS  (After 5pm or on weekends please page the on-call fellow)

## 2022-12-19 NOTE — PROGRESS NOTE ADULT - SUBJECTIVE AND OBJECTIVE BOX
CARDIOLOGY CONSULT PROGRESS NOTE  MARY ROBIN  MRN-[id number]    INTERVAL EVENTS:  - NAEO  - continued on IV diuresis, pt reports edema improving, denies cp, sob, palpitations    ROS:  Negative, except as above.    MEDICATIONS  (STANDING):  aspirin enteric coated 81 milliGRAM(s) Oral daily  atorvastatin 80 milliGRAM(s) Oral at bedtime  buMETAnide Injectable 2 milliGRAM(s) IV Push every 12 hours  carvedilol 25 milliGRAM(s) Oral every 12 hours  clopidogrel Tablet 75 milliGRAM(s) Oral daily  clotrimazole/betamethasone Cream 1 Application(s) Topical two times a day  dextrose 5%. 1000 milliLiter(s) (100 mL/Hr) IV Continuous <Continuous>  dextrose 5%. 1000 milliLiter(s) (50 mL/Hr) IV Continuous <Continuous>  dextrose 50% Injectable 25 Gram(s) IV Push once  dextrose 50% Injectable 25 Gram(s) IV Push once  dextrose 50% Injectable 12.5 Gram(s) IV Push once  gabapentin 100 milliGRAM(s) Oral daily  glucagon  Injectable 1 milliGRAM(s) IntraMuscular once  heparin   Injectable 5000 Unit(s) SubCutaneous every 8 hours  hydrALAZINE 100 milliGRAM(s) Oral every 8 hours  insulin glargine Injectable (LANTUS) 35 Unit(s) SubCutaneous at bedtime  insulin lispro (ADMELOG) corrective regimen sliding scale   SubCutaneous at bedtime  insulin lispro (ADMELOG) corrective regimen sliding scale   SubCutaneous three times a day before meals  insulin lispro Injectable (ADMELOG) 10 Unit(s) SubCutaneous three times a day before meals  NIFEdipine XL 60 milliGRAM(s) Oral every 24 hours  spironolactone 50 milliGRAM(s) Oral daily    MEDICATIONS  (PRN):  acetaminophen     Tablet .. 650 milliGRAM(s) Oral every 6 hours PRN Temp greater or equal to 38C (100.4F), Mild Pain (1 - 3)  albuterol/ipratropium for Nebulization 3 milliLiter(s) Nebulizer every 6 hours PRN Shortness of Breath and/or Wheezing  aluminum hydroxide/magnesium hydroxide/simethicone Suspension 30 milliLiter(s) Oral every 4 hours PRN Dyspepsia  bisacodyl 5 milliGRAM(s) Oral every 12 hours PRN Constipation  dextrose Oral Gel 15 Gram(s) Oral once PRN Blood Glucose LESS THAN 70 milliGRAM(s)/deciliter  melatonin 3 milliGRAM(s) Oral at bedtime PRN Insomnia  ondansetron Injectable 4 milliGRAM(s) IV Push every 8 hours PRN Nausea and/or Vomiting  polyethylene glycol 3350 17 Gram(s) Oral two times a day PRN Constipation  senna 2 Tablet(s) Oral at bedtime PRN Constipation  sodium chloride 0.65% Nasal 1 Spray(s) Both Nostrils every 6 hours PRN congestion/dryness    Allergies    No Known Allergies    Intolerances      P/E:  Vital Signs Last 24 Hrs  T(C): 37.1 (19 Dec 2022 06:00), Max: 37.2 (18 Dec 2022 21:20)  T(F): 98.8 (19 Dec 2022 06:00), Max: 98.9 (18 Dec 2022 21:20)  HR: 57 (19 Dec 2022 06:00) (57 - 65)  BP: 147/57 (19 Dec 2022 06:00) (134/48 - 164/66)  BP(mean): --  RR: 18 (19 Dec 2022 06:00) (18 - 18)  SpO2: 97% (19 Dec 2022 06:00) (83% - 97%)    Parameters below as of 19 Dec 2022 06:00  Patient On (Oxygen Delivery Method): nasal cannula  O2 Flow (L/min): 2    Daily     Daily   I&O's Detail    18 Dec 2022 07:01  -  19 Dec 2022 07:00  --------------------------------------------------------  IN:  Total IN: 0 mL    OUT:    Voided (mL): 2000 mL  Total OUT: 2000 mL    Total NET: -2000 mL        I&O's Summary    18 Dec 2022 07:01  -  19 Dec 2022 07:00  --------------------------------------------------------  IN: 0 mL / OUT: 2000 mL / NET: -2000 mL      - gen: well appearing, sitting up in hospital bed, NAD  - HEENT: MMM, NCAT  - neck: +JVD up to mandible at 30degrees, supple  - heart: RRR, nml S1/S2, no m/r/g  - lungs: crackles at bases b/l  - abd: soft, NTND, no r/g  - ext: WWP, 2+ edema up to at least knees b/l    RELEVANT RECENT LABS/IMAGING/STUDIES:                        9.3    10.02 )-----------( 235      ( 19 Dec 2022 09:35 )             29.8     12-19    139  |  101  |  62<H>  ----------------------------<  89  4.5   |  24  |  2.02<H>    Ca    9.3      19 Dec 2022 09:35  Phos  5.2     12-19  Mg     2.3     12-19    TPro  7.1  /  Alb  3.8  /  TBili  0.6  /  DBili  x   /  AST  31  /  ALT  35  /  AlkPhos  89  12-19    LIVER FUNCTIONS - ( 19 Dec 2022 09:35 )  Alb: 3.8 g/dL / Pro: 7.1 g/dL / ALK PHOS: 89 U/L / ALT: 35 U/L / AST: 31 U/L / GGT: x           PT/INR - ( 19 Dec 2022 09:35 )   PT: 14.3 sec;   INR: 1.23 ratio         PTT - ( 19 Dec 2022 09:35 )  PTT:30.6 sec  --------------------------------------        --------------------------------------

## 2022-12-19 NOTE — PROGRESS NOTE ADULT - SUBJECTIVE AND OBJECTIVE BOX
[facility] DIVISION OF KIDNEY DISEASES AND HYPERTENSION -- FOLLOW UP NOTE  --------------------------------------------------------------------------------  HPI: 62 year old female with PMH of HTN, HLD, DM, CHF admitted with worsening SOB and LE swelling. Pt noted to have worsening of SCr. Nephrology consulted for RADHA. Pt seen and examined. Pt with of hx of CHF. Upon review of labs, Scr was elevated at 1.96 on 11/11/22/. No prior labs available to review. Pt says he has not seen any nephrologist in the past. Pt was admitted in November 2022 with similar complaints. Scr was elevated at 2.06 on 11/17/22.  UA done on 12/9/22 showed proteinuria. Pt currently receiving Bumex for fluid overload. Pt complaining of persistent LE swelling. Denies fever, chills, nausea, vomiting, abdominal pain, HA during rounds. Pt is nonoliguric.    Pt. seen today, endorsers improvement with breathing. He states he iwas diaphoretic this AM and notable was hypoglycemic around that time as well. Tentatively for [facility].         PAST HISTORY  --------------------------------------------------------------------------------  No significant changes to PMH, PSH, FHx, SHx, unless otherwise noted    ALLERGIES & MEDICATIONS  --------------------------------------------------------------------------------  Allergies    No Known Allergies    Intolerances      Standing Inpatient Medications  aspirin enteric coated 81 milliGRAM(s) Oral daily  atorvastatin 80 milliGRAM(s) Oral at bedtime  buMETAnide Injectable 2 milliGRAM(s) IV Push every 12 hours  carvedilol 25 milliGRAM(s) Oral every 12 hours  clopidogrel Tablet 75 milliGRAM(s) Oral daily  clotrimazole/betamethasone Cream 1 Application(s) Topical two times a day  dextrose 5%. 1000 milliLiter(s) IV Continuous <Continuous>  dextrose 5%. 1000 milliLiter(s) IV Continuous <Continuous>  dextrose 50% Injectable 25 Gram(s) IV Push once  dextrose 50% Injectable 25 Gram(s) IV Push once  dextrose 50% Injectable 12.5 Gram(s) IV Push once  gabapentin 100 milliGRAM(s) Oral daily  glucagon  Injectable 1 milliGRAM(s) IntraMuscular once  heparin   Injectable 5000 Unit(s) SubCutaneous every 8 hours  hydrALAZINE 100 milliGRAM(s) Oral every 8 hours  insulin glargine Injectable (LANTUS) 35 Unit(s) SubCutaneous at bedtime  insulin lispro (ADMELOG) corrective regimen sliding scale   SubCutaneous at bedtime  insulin lispro (ADMELOG) corrective regimen sliding scale   SubCutaneous three times a day before meals  insulin lispro Injectable (ADMELOG) 10 Unit(s) SubCutaneous three times a day before meals  NIFEdipine XL 60 milliGRAM(s) Oral every 24 hours  spironolactone 50 milliGRAM(s) Oral daily    PRN Inpatient Medications  acetaminophen     Tablet .. 650 milliGRAM(s) Oral every 6 hours PRN  albuterol/ipratropium for Nebulization 3 milliLiter(s) Nebulizer every 6 hours PRN  aluminum hydroxide/magnesium hydroxide/simethicone Suspension 30 milliLiter(s) Oral every 4 hours PRN  bisacodyl 5 milliGRAM(s) Oral every 12 hours PRN  dextrose Oral Gel 15 Gram(s) Oral once PRN  melatonin 3 milliGRAM(s) Oral at bedtime PRN  ondansetron Injectable 4 milliGRAM(s) IV Push every 8 hours PRN  polyethylene glycol 3350 17 Gram(s) Oral two times a day PRN  senna 2 Tablet(s) Oral at bedtime PRN  sodium chloride 0.65% Nasal 1 Spray(s) Both Nostrils every 6 hours PRN      REVIEW OF SYSTEMS  --------------------------------------------------------------------------------  As per HPI    VITALS/PHYSICAL EXAM  --------------------------------------------------------------------------------  T(C): 36.8 (12-19-22 @ 13:48), Max: 37.2 (12-18-22 @ 21:20)  HR: 64 (12-19-22 @ 13:48) (57 - 65)  BP: 144/58 (12-19-22 @ 13:48) (134/48 - 158/62)  RR: 18 (12-19-22 @ 13:48) (18 - 18)  SpO2: 97% (12-19-22 @ 13:48) (83% - 97%)  Wt(kg): --        12-18-22 @ 07:01  -  12-19-22 @ 07:00  --------------------------------------------------------  IN: 0 mL / OUT: 2000 mL / NET: -2000 mL        Physical Exam:  	Gen: NAD  	HEENT: MMM  	Pulm: CTA B/L- anteriorly, decreased air flow. able to lie flat on 3L NC.  	CV: S1S2  	Abd: Soft, +BS   	Ext: ++ LE edema B/L  	Neuro: Awake  	Skin: Warm and dry  	Vascular access: Peripheral      LABS/STUDIES  --------------------------------------------------------------------------------              9.3    10.02 >-----------<  235      [12-19-22 @ 09:35]              29.8     139  |  101  |  62  ----------------------------<  89      [12-19-22 @ 09:35]  4.5   |  24  |  2.02        Ca     9.3     [12-19-22 @ 09:35]      Mg     2.3     [12-19-22 @ 09:35]      Phos  5.2     [12-19-22 @ 09:35]    TPro  7.1  /  Alb  3.8  /  TBili  0.6  /  DBili  x   /  AST  31  /  ALT  35  /  AlkPhos  89  [12-19-22 @ 09:35]    PT/INR: PT 14.3 , INR 1.23       [12-19-22 @ 09:35]  PTT: 30.6       [12-19-22 @ 09:35]      Creatinine Trend:  SCr 2.02 [12-19 @ 09:35]  SCr 1.75 [12-17 @ 11:24]  SCr 1.76 [12-16 @ 07:06]  SCr 1.83 [12-15 @ 06:21]  SCr 1.85 [12-14 @ 07:27]    Urinalysis - [12-09-22 @ 20:35]      Color Light Yellow / Appearance Clear / SG 1.017 / pH 5.5      Gluc Negative / Ketone Negative  / Bili Negative / Urobili Negative       Blood Negative / Protein 100 mg/dL / Leuk Est Negative / Nitrite Negative      RBC 3 / WBC 2 / Hyaline 3 / Gran  / Sq Epi  / Non Sq Epi 1 / Bacteria Negative    Urine Creatinine 69      [12-15-22 @ 16:40]  Urine Protein 68      [12-15-22 @ 16:40]  Urine Sodium 48      [12-15-22 @ 16:40]  Urine Urea Nitrogen 585      [12-15-22 @ 16:38]  Urine Potassium 25      [12-15-22 @ 16:40]  Urine Osmolality 378      [12-15-22 @ 16:40]    Lipid: chol 67, TG 63, HDL 26, LDL --      [12-10-22 @ 06:55]    HBsAb Nonreact      [12-17-22 @ 11:24]  HBsAg Nonreact      [12-16-22 @ 11:38]  HCV 0.18, Nonreact      [12-10-22 @ 06:55]    Free Light Chains: kappa 10.41, lambda 4.40, ratio = 2.37      [12-16 @ 11:38]

## 2022-12-19 NOTE — PROGRESS NOTE ADULT - SUBJECTIVE AND OBJECTIVE BOX
[facility]-- WOUND TEAM -- FOLLOW UP NOTE  --------------------------------------------------------------------------------    24 hour events/subjective:    afebrile  tolerating po no n/v  ambulating  tolerating ace wrapping, less swelling     no excess drainage    Diet:  Diet, DASH/TLC:   Sodium & Cholesterol Restricted  Consistent Carbohydrate Evening Snack (CSTCHOSN)  1000mL Fluid Restriction ([id number])     Special Instructions for Nursin milliLiter(s) to 2000 milliLiter(s) fluid restriction (12-10-22 @ 01:39)      ROS: General/ SKIN/ MSK/ VascI see HPI  all other systems negative    ALLERGIES & MEDICATIONS  -------------------------------------------------------------------------------    No Known Allergies      STANDING INPATIENT MEDICATIONS  aspirin enteric coated 81 milliGRAM(s) Oral daily  atorvastatin 80 milliGRAM(s) Oral at bedtime  buMETAnide Injectable 2 milliGRAM(s) IV Push every 12 hours  carvedilol 25 milliGRAM(s) Oral every 12 hours  clopidogrel Tablet 75 milliGRAM(s) Oral daily  clotrimazole/betamethasone Cream 1 Application(s) Topical two times a day  dextrose 5%. 1000 milliLiter(s) IV Continuous <Continuous>  dextrose 5%. 1000 milliLiter(s) IV Continuous <Continuous>  dextrose 50% Injectable 25 Gram(s) IV Push once  dextrose 50% Injectable 12.5 Gram(s) IV Push once  dextrose 50% Injectable 25 Gram(s) IV Push once  gabapentin 100 milliGRAM(s) Oral daily  glucagon  Injectable 1 milliGRAM(s) IntraMuscular once  heparin   Injectable 5000 Unit(s) SubCutaneous every 8 hours  hydrALAZINE 100 milliGRAM(s) Oral every 8 hours  insulin glargine Injectable (LANTUS) 35 Unit(s) SubCutaneous at bedtime  insulin lispro (ADMELOG) corrective regimen sliding scale   SubCutaneous at bedtime  insulin lispro (ADMELOG) corrective regimen sliding scale   SubCutaneous three times a day before meals  insulin lispro Injectable (ADMELOG) 10 Unit(s) SubCutaneous three times a day before meals  NIFEdipine XL 60 milliGRAM(s) Oral every 24 hours  spironolactone 50 milliGRAM(s) Oral daily      PRN INPATIENT MEDICATION  acetaminophen     Tablet .. 650 milliGRAM(s) Oral every 6 hours PRN  albuterol/ipratropium for Nebulization 3 milliLiter(s) Nebulizer every 6 hours PRN  aluminum hydroxide/magnesium hydroxide/simethicone Suspension 30 milliLiter(s) Oral every 4 hours PRN  bisacodyl 5 milliGRAM(s) Oral every 12 hours PRN  dextrose Oral Gel 15 Gram(s) Oral once PRN  melatonin 3 milliGRAM(s) Oral at bedtime PRN  ondansetron Injectable 4 milliGRAM(s) IV Push every 8 hours PRN  polyethylene glycol 3350 17 Gram(s) Oral two times a day PRN  senna 2 Tablet(s) Oral at bedtime PRN  sodium chloride 0.65% Nasal 1 Spray(s) Both Nostrils every 6 hours PRN        VITALS/PHYSICAL EXAM  --------------------------------------------------------------------------------  T(C): 36.8 (22 @ 18:00), Max: 37.2 (22 @ 21:20)  HR: 78 (22 @ 18:00) (57 - 78)  BP: 149/53 (22 @ 18:00) (134/48 - 157/64)  RR: 18 (22 @ 18:00) (18 - 20)  SpO2: 92% (22 @ 18:00) (85% - 97%)  Wt(kg): --        22 @ 07:01  -  22 @ 07:00  --------------------------------------------------------  IN: 0 mL / OUT: 2000 mL / NET: -2000 mL    22 @ 07:01  -  22 @ 20:29  --------------------------------------------------------  IN: 150 mL / OUT: 600 mL / NET: -450 mL        NAD,  A&Ox3, Obese  WD/ WN/ WG  Versa Care [id number]  HEENT:  NC/AT, EOMI, sclera clear, mucosa moist, throat clear, trachea midline, neck supple  Respiratory: nonlabored w/ equal chest rise  Gastrointestinal soft NT/ND  Neurology:  weakened strength & sensation grossly intact  Psych: calm/ appropriate  Musculoskeletal: FROM, no deformities/ contractures  Vascular: BLE equally warm,  no cyanosis, clubbing,  nor acute ischemia      BLE edema equal w/o BLE DP/PT pulses palpable     BLE hemosiderin staining     BLE w/ macerated and denuded skin     Lt 3& 4th toes w/ dried adherent blisters       serosanguinous drainage  No odor, increased warmth, tenderness, induration, fluctuance, nor crepitus  Skin:  moist w/ good turgor        LABS/ CULTURES/ RADIOLOGY:              9.3    10.02 >-----------<  235      [22 @ 09:35]              29.8     139  |  101  |  62  ----------------------------<  89      [22 @ 09:35]  4.5   |  24  |  2.02        Ca     9.3     [22 @ 09:35]      Mg     2.3     [22 @ 09:35]      Phos  5.2     [22 09:35]    TPro  7.1  /  Alb  3.8  /  TBili  0.6  /  DBili  x   /  AST  31  /  ALT  35  /  AlkPhos  89  [22 @ 09:35]    PT/INR: PT 14.3 , INR 1.23       [22 @ 09:35]  PTT: 30.6       [22 @ 09:35]      CAPILLARY BLOOD GLUCOSE  POCT Blood Glucose.: 169 mg/dL (19 Dec 2022 16:53)  POCT Blood Glucose.: 246 mg/dL (19 Dec 2022 13:16)  POCT Blood Glucose.: 119 mg/dL (19 Dec 2022 09:28)  POCT Blood Glucose.: 97 mg/dL (19 Dec 2022 09:10)  POCT Blood Glucose.: 63 mg/dL (19 Dec 2022 08:48)  POCT Blood Glucose.: 63 mg/dL (19 Dec 2022 08:47)  POCT Blood Glucose.: 53 mg/dL (19 Dec 2022 08:32)  POCT Blood Glucose.: 53 mg/dL (19 Dec 2022 08:31)  POCT Blood Glucose.: 186 mg/dL (18 Dec 2022 21:14)      A1C with Estimated Average Glucose Result: 8.7 % (12-10-22 @ 06:54)  A1C with Estimated Average Glucose Result: 9.4 % (22 @ 12:42)        ACC: [id number] EXAM:  DUPLEX SCAN EXT VEINS LOWER BI                          PROCEDURE DATE:  2022          INTERPRETATION:  CLINICAL INFORMATION: COVID positive, lower extremity   swelling and erythema    COMPARISON: None available.    TECHNIQUE: Duplex sonography of the BILATERAL LOWER extremity veins with   color and spectral Doppler, with and without compression.    FINDINGS:    RIGHT:  Normal compressibility of the RIGHT common femoral, femoral and popliteal   veins.  Doppler examination shows normal spontaneous and phasic flow.  No RIGHT calf vein thrombosis is detected.    LEFT:  Normal compressibility of the LEFT common femoral, femoral and popliteal   veins.  Doppler examination shows normal spontaneous and phasic flow.  No LEFT calf vein thrombosis is detected.    IMPRESSION:  No evidence of deep venous thrombosis in either lower extremity.

## 2022-12-19 NOTE — PROGRESS NOTE ADULT - PROBLEM SELECTOR PLAN 5
Continuing basal bolus insulin per prior admission levels.  - basal bolus: Lantus 45U QHS, Lispro 10U tid AC, low ISS ACHS per prior admission Continuing basal bolus insulin per prior admission levels.  - basal bolus: Lantus 45U QHS, Lispro 10U tid AC, low ISS ACHS per prior admission  - lantus decreased to 35U i/s/o episode of AM hypoglycemia on 12/19

## 2022-12-19 NOTE — PROGRESS NOTE ADULT - ATTENDING COMMENTS
62M former smoker w/ HFpEF (TTE 11/14/22 EF 57%), h/o NSTEMI, HTN, HLD, T2DM on insulin, p/w worsening SOB, leg/scrotal edema, c/f ADHF, found covid-19 positive.    #DM2: episode of hypoglyecemia this am. unclear reason. pt with adequate PO intake and no recent changes in insulin.  Will dec basal insulin. cont with premeal   #Acute on chronic systolic HF: (recent echo with EF 40-45%)   recent hosp ~ 1 month ago. not discharged home on diuretics due to renal dysfunction.   Cont with IV bumex 2mg BID. monitor StrictI+Os. daily weights. keep neg.   eventual plans for further ischemic w/u - possible cath.   #COVID+: clinically with some mild cough. no significant dyspnea not off supplement   cont have episode of hypoxia intermittently. will likely need O2.   Will monitor off treatment.   #RADHA on CKD3: Cr somewhat plateaued. will monitor while cont with diuresis.   strict I+Os, weight, Cr. Complex Repair And Single Advancement Flap Text: The defect edges were debeveled with a #15 scalpel blade.  The primary defect was closed partially with a complex linear closure.  Given the location of the remaining defect, shape of the defect and the proximity to free margins a single advancement flap was deemed most appropriate for complete closure of the defect.  Using a sterile surgical marker, an appropriate advancement flap was drawn incorporating the defect and placing the expected incisions within the relaxed skin tension lines where possible.    The area thus outlined was incised deep to adipose tissue with a #15 scalpel blade.  The skin margins were undermined to an appropriate distance in all directions utilizing iris scissors.

## 2022-12-19 NOTE — PROGRESS NOTE ADULT - ASSESSMENT
62M former smoker w/ HFpEF (TTE EF 57%, 11/14/22), HTN, HLD, IDDM, and recent NSTEMI, CKD III, who presented with worsening HF exacerbation in setting of not having diuretics at home. Remains hypervolemic, continued on IV diuresis.    # Acute decompensated heart failure  - Patient continues to exhibit hypervolemia on exam   - Continue IV Bumex 2mg BID.   - c/w carvedilol 25mg q12hr, spironolactone 50mg daily  - consider SGLT2 once euvolemic   - Strict I/O and daily STANDING weight.  - Check electrolytes. K> 4 and Mg >2  - monitor on telemetry  - troponin has downtrended. Patient reports no chest pain, unlikely had recurrent ischemic event. HF exacerbation likely due to lack of diuretics regimen at home. No need for repeat TTE.  - Once patient is more euvolemic with improved kidney function and off isolation with COVID, will consider ischemic evaluation.  Cardiac cath was delayed during last hospitalization given RADHA  - Nephrology following, unclear etiology of CKD, screening workup recommended. Appears to have CKD3B and renal function close to baseline per nephrology     Andrew Juan MD  Cardiology Fellow - PGY4    For all New Consults and Questions:  [email or web address]   Login: [email or web address]    *** Recommendations are preliminary until cosigned by the attending.

## 2022-12-19 NOTE — PROGRESS NOTE ADULT - ATTENDING COMMENTS
Agree with fellow's assessment above. 62M with CKD, DM, HTN transferred from [facility] last month SOB and chest discomfort, elevated troponin, ADHF and NSTEMI. Successful diuresis and symptoms improved. COVID+ and in isolation.     1. Acute decompensated HF:   - likely due to non-compliance with diuretics.   - EF is normal, no segmental Lv dysfunction.   - continue coreg, aldactone at current dose. Monitor creatinine closely. Given renal function, will hold off on SGLT2i at this time.     2. HLD:  - continue atorvastatin     3. HTN: /58  - continue coreg, aldactone and nifedipine at current dose    Griffin Loaiza MD  Attending Physician   Cardiology Inpatient Consult Service     [facility] [place]   [address], [address]  [place] [address]   Tel: [phone]  Fax: [phone]    Please check [email or web address] for cardiology service schedule and contact information.

## 2022-12-19 NOTE — PROGRESS NOTE ADULT - PROBLEM SELECTOR PLAN 1
NSTEMI last month w/ LHC deferred d/t poor renal function. Now p/w ADHF. Currently following w/ Dr. Shaik Thomson  - c/w diuresis w/ IV bumex 2 bid, goal 2-2.5L per day  - strict I/O, 1L fluid restriction, daily weights  - cards planning to reevaluate patient on monday 12/19 when off covid isolation for potential LHC  - appreciate cards recs daily NSTEMI last month w/ LHC deferred d/t poor renal function. Now p/w ADHF. Currently following w/ Dr. Shaik Thomson  - c/w diuresis w/ IV bumex 2 bid, goal 2-2.5L per day  - strict I/O, 1L fluid restriction, daily weights  - cards planning to reevaluate patient today, monday 12/19 when off covid isolation for potential LHC  - appreciate cards recs daily

## 2022-12-19 NOTE — PROGRESS NOTE ADULT - SUBJECTIVE AND OBJECTIVE BOX
Candida Castro MD  Internal Medicine, PGY-1        Patient Name: MARY ROBIN  Patient MRN: [id number]    Patient is a 62y old  Male who presents with a chief complaint of HF exacerbation (18 Dec 2022 06:55)      **SUBJECTIVE**    Last 24 hours: No acute events overnight. Patient continues to use     Review of Systems  Constitutional: No weakness, fevers, or chills  HEENT: No headache, visual changes, lightheadedness, or sore throat  Respiratory: No shortness of breath, cough, wheezing, or hemoptysis  Cardiovascular: No chest pain or palpitations  GI: No abdominal or epigastric pain. No nausea, vomiting, or change in bowel habits. No hematemesis or hematochezia  Neuro: No numbness or weakness  Skin: No itching or rashes      **OBJECTIVE**        VITALS:   T(C): 37.2 (12-18-22 @ 21:20), Max: 37.2 (12-18-22 @ 21:20)  HR: 64 (12-18-22 @ 21:20) (62 - 65)  BP: 144/52 (12-18-22 @ 21:22) (134/48 - 164/66)  RR: 18 (12-18-22 @ 21:20) (18 - 18)  SpO2: 93% (12-18-22 @ 21:20) (83% - 95%)    GENERAL: NAD, lying in bed comfortably  HEAD:  Normocephalic  EYES: Sclera clear  ENT: Moist mucous membranes  NECK: Supple, No JVD  CHEST/LUNG: Clear to auscultation bilaterally; No rales, rhonchi, wheezing, or rubs. Unlabored respirations  HEART: Regular rate and rhythm; No murmurs, rubs, or gallops  ABDOMEN: BSx4; Soft, nontender, nondistended  EXTREMITIES:  2+ Peripheral Pulses, brisk capillary refill. No clubbing, cyanosis, or edema  NERVOUS SYSTEM:  A&Ox3, no focal deficits   SKIN: No rashes or lesions    Labs  12-17    138  |  103  |  59<H>  ----------------------------<  157<H>  4.5   |  23  |  1.75<H>    Ca    9.2      17 Dec 2022 11:24  Phos  4.1     12-17  Mg     2.2     12-17    TPro  7.2  /  Alb  3.8  /  TBili  0.7  /  DBili  x   /  AST  25  /  ALT  36  /  AlkPhos  95  12-17    CBC Full  -  ( 17 Dec 2022 11:24 )  WBC Count : 9.93 K/uL  RBC Count : 3.21 M/uL  Hemoglobin : 9.4 g/dL  Hematocrit : 30.4 %  Platelet Count - Automated : 241 K/uL  Mean Cell Volume : 94.7 fl  Mean Cell Hemoglobin : 29.3 pg  Mean Cell Hemoglobin Concentration : 30.9 gm/dL  Auto Neutrophil # : x  Auto Lymphocyte # : x  Auto Monocyte # : x  Auto Eosinophil # : x  Auto Basophil # : x  Auto Neutrophil % : x  Auto Lymphocyte % : x  Auto Monocyte % : x  Auto Eosinophil % : x  Auto Basophil % : x          PT/INR - ( 17 Dec 2022 11:24 )   PT: 14.8 sec;   INR: 1.27 ratio             POC Glucose  CAPILLARY BLOOD GLUCOSE      POCT Blood Glucose.: 186 mg/dL (18 Dec 2022 21:14)  POCT Blood Glucose.: 154 mg/dL (18 Dec 2022 17:55)  POCT Blood Glucose.: 94 mg/dL (18 Dec 2022 13:27)  POCT Blood Glucose.: 147 mg/dL (18 Dec 2022 08:38)      I&O's  I&O's Summary    18 Dec 2022 07:01  -  19 Dec 2022 07:00  --------------------------------------------------------  IN: 0 mL / OUT: 1600 mL / NET: -1600 mL        UA (if applicable)      Micro (if applicable)      Imaging (if applicable)    Active Medications  MEDICATIONS  (STANDING):  aspirin enteric coated 81 milliGRAM(s) Oral daily  atorvastatin 80 milliGRAM(s) Oral at bedtime  buMETAnide Injectable 2 milliGRAM(s) IV Push every 12 hours  carvedilol 25 milliGRAM(s) Oral every 12 hours  clopidogrel Tablet 75 milliGRAM(s) Oral daily  clotrimazole/betamethasone Cream 1 Application(s) Topical two times a day  dextrose 5%. 1000 milliLiter(s) (100 mL/Hr) IV Continuous <Continuous>  dextrose 5%. 1000 milliLiter(s) (50 mL/Hr) IV Continuous <Continuous>  dextrose 50% Injectable 25 Gram(s) IV Push once  dextrose 50% Injectable 25 Gram(s) IV Push once  dextrose 50% Injectable 12.5 Gram(s) IV Push once  gabapentin 100 milliGRAM(s) Oral daily  glucagon  Injectable 1 milliGRAM(s) IntraMuscular once  heparin   Injectable 5000 Unit(s) SubCutaneous every 8 hours  hydrALAZINE 100 milliGRAM(s) Oral every 8 hours  insulin glargine Injectable (LANTUS) 45 Unit(s) SubCutaneous at bedtime  insulin lispro (ADMELOG) corrective regimen sliding scale   SubCutaneous at bedtime  insulin lispro (ADMELOG) corrective regimen sliding scale   SubCutaneous three times a day before meals  insulin lispro Injectable (ADMELOG) 10 Unit(s) SubCutaneous three times a day before meals  NIFEdipine XL 60 milliGRAM(s) Oral every 24 hours  spironolactone 50 milliGRAM(s) Oral daily    MEDICATIONS  (PRN):  acetaminophen     Tablet .. 650 milliGRAM(s) Oral every 6 hours PRN Temp greater or equal to 38C (100.4F), Mild Pain (1 - 3)  albuterol/ipratropium for Nebulization 3 milliLiter(s) Nebulizer every 6 hours PRN Shortness of Breath and/or Wheezing  aluminum hydroxide/magnesium hydroxide/simethicone Suspension 30 milliLiter(s) Oral every 4 hours PRN Dyspepsia  bisacodyl 5 milliGRAM(s) Oral every 12 hours PRN Constipation  dextrose Oral Gel 15 Gram(s) Oral once PRN Blood Glucose LESS THAN 70 milliGRAM(s)/deciliter  melatonin 3 milliGRAM(s) Oral at bedtime PRN Insomnia  ondansetron Injectable 4 milliGRAM(s) IV Push every 8 hours PRN Nausea and/or Vomiting  polyethylene glycol 3350 17 Gram(s) Oral two times a day PRN Constipation  senna 2 Tablet(s) Oral at bedtime PRN Constipation  sodium chloride 0.65% Nasal 1 Spray(s) Both Nostrils every 6 hours PRN congestion/dryness         Candida Castro MD  Internal Medicine, PGY-1        Patient Name: MARY ROBIN  Patient MRN: [id number]    Patient is a 62y old  Male who presents with a chief complaint of HF exacerbation (18 Dec 2022 06:55)      **SUBJECTIVE**    Last 24 hours: No acute events overnight. Patient placed back on NC 2/2 periodic desaturations with SpO2 <88%; however, only properly uses the NC periodically without associated desaturation. Continues to note slow improvement in swelling but says he mostly "cant tell" anymore.     Review of Systems  Constitutional: No weakness, fevers, or chills  HEENT: No headache, visual changes, lightheadedness, or sore throat  Respiratory: No shortness of breath, cough, wheezing, or hemoptysis  Cardiovascular: No chest pain or palpitations  GI: No abdominal or epigastric pain. No nausea, vomiting, or change in bowel habits. No hematemesis or hematochezia  Neuro: No numbness or weakness  Skin: No itching or rashes      **OBJECTIVE**        VITALS:   T(C): 37.2 (12-18-22 @ 21:20), Max: 37.2 (12-18-22 @ 21:20)  HR: 64 (12-18-22 @ 21:20) (62 - 65)  BP: 144/52 (12-18-22 @ 21:22) (134/48 - 164/66)  RR: 18 (12-18-22 @ 21:20) (18 - 18)  SpO2: 93% (12-18-22 @ 21:20) (83% - 95%)    GENERAL: NAD, lying in bed comfortably  HEAD:  Normocephalic  EYES: Sclera clear  ENT: Moist mucous membranes  NECK: Supple, No JVD  CHEST/LUNG: Clear to auscultation bilaterally; No rales, rhonchi, wheezing, or rubs. Unlabored respirations  HEART: Regular rate and rhythm; No murmurs, rubs, or gallops  ABDOMEN: BSx4; Soft, nontender, nondistended  EXTREMITIES:  significant b/l LE edema, + scrotal swelling (somewhat improved)   NERVOUS SYSTEM:  A&Ox3, no focal deficits   SKIN: chronic venous stasis dermatitis     Labs  12-17    138  |  103  |  59<H>  ----------------------------<  157<H>  4.5   |  23  |  1.75<H>    Ca    9.2      17 Dec 2022 11:24  Phos  4.1     12-17  Mg     2.2     12-17    TPro  7.2  /  Alb  3.8  /  TBili  0.7  /  DBili  x   /  AST  25  /  ALT  36  /  AlkPhos  95  12-17    CBC Full  -  ( 17 Dec 2022 11:24 )  WBC Count : 9.93 K/uL  RBC Count : 3.21 M/uL  Hemoglobin : 9.4 g/dL  Hematocrit : 30.4 %  Platelet Count - Automated : 241 K/uL  Mean Cell Volume : 94.7 fl  Mean Cell Hemoglobin : 29.3 pg  Mean Cell Hemoglobin Concentration : 30.9 gm/dL  Auto Neutrophil # : x  Auto Lymphocyte # : x  Auto Monocyte # : x  Auto Eosinophil # : x  Auto Basophil # : x  Auto Neutrophil % : x  Auto Lymphocyte % : x  Auto Monocyte % : x  Auto Eosinophil % : x  Auto Basophil % : x          PT/INR - ( 17 Dec 2022 11:24 )   PT: 14.8 sec;   INR: 1.27 ratio             POC Glucose  CAPILLARY BLOOD GLUCOSE      POCT Blood Glucose.: 186 mg/dL (18 Dec 2022 21:14)  POCT Blood Glucose.: 154 mg/dL (18 Dec 2022 17:55)  POCT Blood Glucose.: 94 mg/dL (18 Dec 2022 13:27)  POCT Blood Glucose.: 147 mg/dL (18 Dec 2022 08:38)      I&O's  I&O's Summary    18 Dec 2022 07:01  -  19 Dec 2022 07:00  --------------------------------------------------------  IN: 0 mL / OUT: 1600 mL / NET: -1600 mL        UA (if applicable)      Micro (if applicable)      Imaging (if applicable)    Active Medications  MEDICATIONS  (STANDING):  aspirin enteric coated 81 milliGRAM(s) Oral daily  atorvastatin 80 milliGRAM(s) Oral at bedtime  buMETAnide Injectable 2 milliGRAM(s) IV Push every 12 hours  carvedilol 25 milliGRAM(s) Oral every 12 hours  clopidogrel Tablet 75 milliGRAM(s) Oral daily  clotrimazole/betamethasone Cream 1 Application(s) Topical two times a day  dextrose 5%. 1000 milliLiter(s) (100 mL/Hr) IV Continuous <Continuous>  dextrose 5%. 1000 milliLiter(s) (50 mL/Hr) IV Continuous <Continuous>  dextrose 50% Injectable 25 Gram(s) IV Push once  dextrose 50% Injectable 25 Gram(s) IV Push once  dextrose 50% Injectable 12.5 Gram(s) IV Push once  gabapentin 100 milliGRAM(s) Oral daily  glucagon  Injectable 1 milliGRAM(s) IntraMuscular once  heparin   Injectable 5000 Unit(s) SubCutaneous every 8 hours  hydrALAZINE 100 milliGRAM(s) Oral every 8 hours  insulin glargine Injectable (LANTUS) 45 Unit(s) SubCutaneous at bedtime  insulin lispro (ADMELOG) corrective regimen sliding scale   SubCutaneous at bedtime  insulin lispro (ADMELOG) corrective regimen sliding scale   SubCutaneous three times a day before meals  insulin lispro Injectable (ADMELOG) 10 Unit(s) SubCutaneous three times a day before meals  NIFEdipine XL 60 milliGRAM(s) Oral every 24 hours  spironolactone 50 milliGRAM(s) Oral daily    MEDICATIONS  (PRN):  acetaminophen     Tablet .. 650 milliGRAM(s) Oral every 6 hours PRN Temp greater or equal to 38C (100.4F), Mild Pain (1 - 3)  albuterol/ipratropium for Nebulization 3 milliLiter(s) Nebulizer every 6 hours PRN Shortness of Breath and/or Wheezing  aluminum hydroxide/magnesium hydroxide/simethicone Suspension 30 milliLiter(s) Oral every 4 hours PRN Dyspepsia  bisacodyl 5 milliGRAM(s) Oral every 12 hours PRN Constipation  dextrose Oral Gel 15 Gram(s) Oral once PRN Blood Glucose LESS THAN 70 milliGRAM(s)/deciliter  melatonin 3 milliGRAM(s) Oral at bedtime PRN Insomnia  ondansetron Injectable 4 milliGRAM(s) IV Push every 8 hours PRN Nausea and/or Vomiting  polyethylene glycol 3350 17 Gram(s) Oral two times a day PRN Constipation  senna 2 Tablet(s) Oral at bedtime PRN Constipation  sodium chloride 0.65% Nasal 1 Spray(s) Both Nostrils every 6 hours PRN congestion/dryness         Candida Castro MD  Internal Medicine, PGY-1        Patient Name: MARY ROBIN  Patient MRN: [id number]    Patient is a 62y old  Male who presents with a chief complaint of HF exacerbation (18 Dec 2022 06:55)      **SUBJECTIVE**    Last 24 hours: No acute events overnight. Patient placed back on NC 2/2 periodic desaturations with SpO2 <88%; however, only properly uses the NC periodically without associated desaturation. Continues to note improvement in swelling. On re-evaluation, patient sweating profusely and found to have BG 50s--> given 2 apple juices and ate breakfast--> BG now 119. Feels fine.     Review of Systems  Constitutional: No weakness, fevers, or chills  HEENT: No headache, visual changes, lightheadedness, or sore throat  Respiratory: No shortness of breath, cough, wheezing, or hemoptysis  Cardiovascular: No chest pain or palpitations  GI: No abdominal or epigastric pain. No nausea, vomiting, or change in bowel habits. No hematemesis or hematochezia  Neuro: No numbness or weakness  Skin: No itching or rashes      **OBJECTIVE**        VITALS:   T(C): 37.2 (12-18-22 @ 21:20), Max: 37.2 (12-18-22 @ 21:20)  HR: 64 (12-18-22 @ 21:20) (62 - 65)  BP: 144/52 (12-18-22 @ 21:22) (134/48 - 164/66)  RR: 18 (12-18-22 @ 21:20) (18 - 18)  SpO2: 93% (12-18-22 @ 21:20) (83% - 95%)    GENERAL: NAD, lying in bed comfortably  HEAD:  Normocephalic  EYES: Sclera clear  ENT: Moist mucous membranes  NECK: Supple, No JVD  CHEST/LUNG: Clear to auscultation bilaterally; No rales, rhonchi, wheezing, or rubs. Unlabored respirations  HEART: Regular rate and rhythm; No murmurs, rubs, or gallops  ABDOMEN: BSx4; Soft, nontender, nondistended  EXTREMITIES:  significant b/l LE edema, + scrotal swelling (somewhat improved)   NERVOUS SYSTEM:  A&Ox3, no focal deficits   SKIN: chronic venous stasis dermatitis     Labs  12-17    138  |  103  |  59<H>  ----------------------------<  157<H>  4.5   |  23  |  1.75<H>    Ca    9.2      17 Dec 2022 11:24  Phos  4.1     12-17  Mg     2.2     12-17    TPro  7.2  /  Alb  3.8  /  TBili  0.7  /  DBili  x   /  AST  25  /  ALT  36  /  AlkPhos  95  12-17    CBC Full  -  ( 17 Dec 2022 11:24 )  WBC Count : 9.93 K/uL  RBC Count : 3.21 M/uL  Hemoglobin : 9.4 g/dL  Hematocrit : 30.4 %  Platelet Count - Automated : 241 K/uL  Mean Cell Volume : 94.7 fl  Mean Cell Hemoglobin : 29.3 pg  Mean Cell Hemoglobin Concentration : 30.9 gm/dL  Auto Neutrophil # : x  Auto Lymphocyte # : x  Auto Monocyte # : x  Auto Eosinophil # : x  Auto Basophil # : x  Auto Neutrophil % : x  Auto Lymphocyte % : x  Auto Monocyte % : x  Auto Eosinophil % : x  Auto Basophil % : x          PT/INR - ( 17 Dec 2022 11:24 )   PT: 14.8 sec;   INR: 1.27 ratio             POC Glucose  CAPILLARY BLOOD GLUCOSE      POCT Blood Glucose.: 186 mg/dL (18 Dec 2022 21:14)  POCT Blood Glucose.: 154 mg/dL (18 Dec 2022 17:55)  POCT Blood Glucose.: 94 mg/dL (18 Dec 2022 13:27)  POCT Blood Glucose.: 147 mg/dL (18 Dec 2022 08:38)      I&O's  I&O's Summary    18 Dec 2022 07:01  -  19 Dec 2022 07:00  --------------------------------------------------------  IN: 0 mL / OUT: 1600 mL / NET: -1600 mL        UA (if applicable)      Micro (if applicable)      Imaging (if applicable)    Active Medications  MEDICATIONS  (STANDING):  aspirin enteric coated 81 milliGRAM(s) Oral daily  atorvastatin 80 milliGRAM(s) Oral at bedtime  buMETAnide Injectable 2 milliGRAM(s) IV Push every 12 hours  carvedilol 25 milliGRAM(s) Oral every 12 hours  clopidogrel Tablet 75 milliGRAM(s) Oral daily  clotrimazole/betamethasone Cream 1 Application(s) Topical two times a day  dextrose 5%. 1000 milliLiter(s) (100 mL/Hr) IV Continuous <Continuous>  dextrose 5%. 1000 milliLiter(s) (50 mL/Hr) IV Continuous <Continuous>  dextrose 50% Injectable 25 Gram(s) IV Push once  dextrose 50% Injectable 25 Gram(s) IV Push once  dextrose 50% Injectable 12.5 Gram(s) IV Push once  gabapentin 100 milliGRAM(s) Oral daily  glucagon  Injectable 1 milliGRAM(s) IntraMuscular once  heparin   Injectable 5000 Unit(s) SubCutaneous every 8 hours  hydrALAZINE 100 milliGRAM(s) Oral every 8 hours  insulin glargine Injectable (LANTUS) 45 Unit(s) SubCutaneous at bedtime  insulin lispro (ADMELOG) corrective regimen sliding scale   SubCutaneous at bedtime  insulin lispro (ADMELOG) corrective regimen sliding scale   SubCutaneous three times a day before meals  insulin lispro Injectable (ADMELOG) 10 Unit(s) SubCutaneous three times a day before meals  NIFEdipine XL 60 milliGRAM(s) Oral every 24 hours  spironolactone 50 milliGRAM(s) Oral daily    MEDICATIONS  (PRN):  acetaminophen     Tablet .. 650 milliGRAM(s) Oral every 6 hours PRN Temp greater or equal to 38C (100.4F), Mild Pain (1 - 3)  albuterol/ipratropium for Nebulization 3 milliLiter(s) Nebulizer every 6 hours PRN Shortness of Breath and/or Wheezing  aluminum hydroxide/magnesium hydroxide/simethicone Suspension 30 milliLiter(s) Oral every 4 hours PRN Dyspepsia  bisacodyl 5 milliGRAM(s) Oral every 12 hours PRN Constipation  dextrose Oral Gel 15 Gram(s) Oral once PRN Blood Glucose LESS THAN 70 milliGRAM(s)/deciliter  melatonin 3 milliGRAM(s) Oral at bedtime PRN Insomnia  ondansetron Injectable 4 milliGRAM(s) IV Push every 8 hours PRN Nausea and/or Vomiting  polyethylene glycol 3350 17 Gram(s) Oral two times a day PRN Constipation  senna 2 Tablet(s) Oral at bedtime PRN Constipation  sodium chloride 0.65% Nasal 1 Spray(s) Both Nostrils every 6 hours PRN congestion/dryness

## 2022-12-20 LAB
ANION GAP SERPL CALC-SCNC: 11 MMOL/L — SIGNIFICANT CHANGE UP (ref 5–17)
BUN SERPL-MCNC: 61 MG/DL — HIGH (ref 7–23)
CALCIUM SERPL-MCNC: 9.1 MG/DL — SIGNIFICANT CHANGE UP (ref 8.4–10.5)
CHLORIDE SERPL-SCNC: 101 MMOL/L — SIGNIFICANT CHANGE UP (ref 96–108)
CO2 SERPL-SCNC: 24 MMOL/L — SIGNIFICANT CHANGE UP (ref 22–31)
CREAT SERPL-MCNC: 1.89 MG/DL — HIGH (ref 0.5–1.3)
EGFR: 39 ML/MIN/1.73M2 — LOW
GLUCOSE BLDC GLUCOMTR-MCNC: 177 MG/DL — HIGH (ref 70–99)
GLUCOSE BLDC GLUCOMTR-MCNC: 190 MG/DL — HIGH (ref 70–99)
GLUCOSE BLDC GLUCOMTR-MCNC: 203 MG/DL — HIGH (ref 70–99)
GLUCOSE BLDC GLUCOMTR-MCNC: 210 MG/DL — HIGH (ref 70–99)
GLUCOSE SERPL-MCNC: 171 MG/DL — HIGH (ref 70–99)
HCT VFR BLD CALC: 30.4 % — LOW (ref 39–50)
HGB BLD-MCNC: 9.4 G/DL — LOW (ref 13–17)
MAGNESIUM SERPL-MCNC: 2.4 MG/DL — SIGNIFICANT CHANGE UP (ref 1.6–2.6)
MCHC RBC-ENTMCNC: 29.5 PG — SIGNIFICANT CHANGE UP (ref 27–34)
MCHC RBC-ENTMCNC: 30.9 GM/DL — LOW (ref 32–36)
MCV RBC AUTO: 95.3 FL — SIGNIFICANT CHANGE UP (ref 80–100)
NRBC # BLD: 0 /100 WBCS — SIGNIFICANT CHANGE UP (ref 0–0)
PHOSPHATE SERPL-MCNC: 4.6 MG/DL — HIGH (ref 2.5–4.5)
PLATELET # BLD AUTO: 237 K/UL — SIGNIFICANT CHANGE UP (ref 150–400)
POTASSIUM SERPL-MCNC: 5 MMOL/L — SIGNIFICANT CHANGE UP (ref 3.5–5.3)
POTASSIUM SERPL-SCNC: 5 MMOL/L — SIGNIFICANT CHANGE UP (ref 3.5–5.3)
RBC # BLD: 3.19 M/UL — LOW (ref 4.2–5.8)
RBC # FLD: 14.5 % — SIGNIFICANT CHANGE UP (ref 10.3–14.5)
SODIUM SERPL-SCNC: 136 MMOL/L — SIGNIFICANT CHANGE UP (ref 135–145)
WBC # BLD: 8.88 K/UL — SIGNIFICANT CHANGE UP (ref 3.8–10.5)
WBC # FLD AUTO: 8.88 K/UL — SIGNIFICANT CHANGE UP (ref 3.8–10.5)

## 2022-12-20 PROCEDURE — 99233 SBSQ HOSP IP/OBS HIGH 50: CPT | Mod: GC

## 2022-12-20 PROCEDURE — 99232 SBSQ HOSP IP/OBS MODERATE 35: CPT | Mod: GC

## 2022-12-20 RX ORDER — HYDROMORPHONE HYDROCHLORIDE 2 MG/ML
0.5 INJECTION INTRAMUSCULAR; INTRAVENOUS; SUBCUTANEOUS ONCE
Refills: 0 | Status: DISCONTINUED | OUTPATIENT
Start: 2022-12-20 | End: 2022-12-20

## 2022-12-20 RX ADMIN — Medication 10 UNIT(S): at 18:00

## 2022-12-20 RX ADMIN — Medication 2: at 08:44

## 2022-12-20 RX ADMIN — HYDROMORPHONE HYDROCHLORIDE 0.5 MILLIGRAM(S): 2 INJECTION INTRAMUSCULAR; INTRAVENOUS; SUBCUTANEOUS at 16:54

## 2022-12-20 RX ADMIN — HYDROMORPHONE HYDROCHLORIDE 0.5 MILLIGRAM(S): 2 INJECTION INTRAMUSCULAR; INTRAVENOUS; SUBCUTANEOUS at 16:27

## 2022-12-20 RX ADMIN — CLOTRIMAZOLE AND BETAMETHASONE DIPROPIONATE 1 APPLICATION(S): 10; .5 CREAM TOPICAL at 18:06

## 2022-12-20 RX ADMIN — Medication 1: at 17:59

## 2022-12-20 RX ADMIN — Medication 10 UNIT(S): at 13:14

## 2022-12-20 RX ADMIN — Medication 650 MILLIGRAM(S): at 14:56

## 2022-12-20 RX ADMIN — Medication 100 MILLIGRAM(S): at 22:16

## 2022-12-20 RX ADMIN — Medication 100 MILLIGRAM(S): at 05:53

## 2022-12-20 RX ADMIN — GABAPENTIN 100 MILLIGRAM(S): 400 CAPSULE ORAL at 10:37

## 2022-12-20 RX ADMIN — CARVEDILOL PHOSPHATE 25 MILLIGRAM(S): 80 CAPSULE, EXTENDED RELEASE ORAL at 05:53

## 2022-12-20 RX ADMIN — Medication 100 MILLIGRAM(S): at 13:16

## 2022-12-20 RX ADMIN — HEPARIN SODIUM 5000 UNIT(S): 5000 INJECTION INTRAVENOUS; SUBCUTANEOUS at 05:44

## 2022-12-20 RX ADMIN — BUMETANIDE 2 MILLIGRAM(S): 0.25 INJECTION INTRAMUSCULAR; INTRAVENOUS at 18:03

## 2022-12-20 RX ADMIN — CARVEDILOL PHOSPHATE 25 MILLIGRAM(S): 80 CAPSULE, EXTENDED RELEASE ORAL at 18:01

## 2022-12-20 RX ADMIN — INSULIN GLARGINE 35 UNIT(S): 100 INJECTION, SOLUTION SUBCUTANEOUS at 22:16

## 2022-12-20 RX ADMIN — POLYETHYLENE GLYCOL 3350 17 GRAM(S): 17 POWDER, FOR SOLUTION ORAL at 11:09

## 2022-12-20 RX ADMIN — Medication 650 MILLIGRAM(S): at 15:10

## 2022-12-20 RX ADMIN — Medication 60 MILLIGRAM(S): at 05:53

## 2022-12-20 RX ADMIN — ATORVASTATIN CALCIUM 80 MILLIGRAM(S): 80 TABLET, FILM COATED ORAL at 22:16

## 2022-12-20 RX ADMIN — BUMETANIDE 2 MILLIGRAM(S): 0.25 INJECTION INTRAMUSCULAR; INTRAVENOUS at 05:45

## 2022-12-20 RX ADMIN — Medication 10 UNIT(S): at 08:45

## 2022-12-20 RX ADMIN — Medication 81 MILLIGRAM(S): at 10:37

## 2022-12-20 RX ADMIN — HEPARIN SODIUM 5000 UNIT(S): 5000 INJECTION INTRAVENOUS; SUBCUTANEOUS at 13:17

## 2022-12-20 RX ADMIN — CLOPIDOGREL BISULFATE 75 MILLIGRAM(S): 75 TABLET, FILM COATED ORAL at 10:38

## 2022-12-20 RX ADMIN — HEPARIN SODIUM 5000 UNIT(S): 5000 INJECTION INTRAVENOUS; SUBCUTANEOUS at 22:16

## 2022-12-20 RX ADMIN — CLOTRIMAZOLE AND BETAMETHASONE DIPROPIONATE 1 APPLICATION(S): 10; .5 CREAM TOPICAL at 05:54

## 2022-12-20 RX ADMIN — Medication 1: at 13:14

## 2022-12-20 RX ADMIN — SPIRONOLACTONE 50 MILLIGRAM(S): 25 TABLET, FILM COATED ORAL at 05:53

## 2022-12-20 NOTE — PROGRESS NOTE ADULT - PROBLEM SELECTOR PLAN 1
NSTEMI last month w/ LHC deferred d/t poor renal function. Now p/w ADHF. Currently following w/ Dr. Shaik Thomson  - c/w diuresis w/ IV bumex 2 bid, goal 2-2.5L per day  - strict I/O, 1L fluid restriction, daily weights  - cards recs: continue with diuresis, will consider LHC when more euvolemic

## 2022-12-20 NOTE — PROGRESS NOTE ADULT - ATTENDING COMMENTS
Agree with fellow's assessment above.   Continue diuresis. Will discuss LHC with interventionalist when more optimized from volume perspective.   Discussed case with Hospitalist Dr. Knapp.     Griffin Loaiza MD  Attending Physician   Cardiology Inpatient Consult Service     [facility] at [place]   [address], [address]  [place] [address]   Tel: [phone]  Fax: [phone]    Please check [email or web address]: [email or web address]" for cardiology service schedule and contact information. Agree with fellow's assessment above.   Continue diuresis. Renal function is stable 1.75-2. Will discuss LHC with interventionalist when more optimized from volume perspective.   Discussed case with Hospitalist Dr. Knapp.     Griffin Loaiza MD  Attending Physician   Cardiology Inpatient Consult Service     [facility] at [place]   [address], [address]  [place] [address]   Tel: [phone]  Fax: [phone]    Please check [email or web address]: [email or web address]" for cardiology service schedule and contact information.

## 2022-12-20 NOTE — PROVIDER CONTACT NOTE (OTHER) - ASSESSMENT
Took NC off before going to sleep and was maintaining sat around 92%, after ~1hr the pt O2 desatted. maintains O2 sat while sleeping after putting 2LNC back on

## 2022-12-20 NOTE — PROGRESS NOTE ADULT - ASSESSMENT
Assessment/Plan:    left 3rd toe wound: dry stable, non-infected  DM    recommend betadine paint every other day to eschar  recommend continued routine podiatric foot care as outpatient  will follow up prn

## 2022-12-20 NOTE — PROGRESS NOTE ADULT - SUBJECTIVE AND OBJECTIVE BOX
Candida Castro MD  Internal Medicine, PGY-1        Patient Name: MARY ROBIN  Patient MRN: [id number]    Patient is a 62y old  Male who presents with a chief complaint of HF exacerbation (19 Dec 2022 20:28)      **SUBJECTIVE**    Last 24 hours: No acute events overnight. Patient continues to feel well overall. Notes visible improvement in swelling. Expresses concern about remaining in the hospital through the holidays.    Review of Systems  Constitutional: No weakness, fevers, or chills  HEENT: No headache, visual changes, lightheadedness, or sore throat  Respiratory: No shortness of breath, cough, wheezing, or hemoptysis  Cardiovascular: No chest pain or palpitations  GI: No abdominal or epigastric pain. No nausea, vomiting, or change in bowel habits. No hematemesis or hematochezia  Neuro: No numbness or weakness  Skin: No itching or rashes      **OBJECTIVE**        VITALS:   T(C): 36.9 (22 @ 05:08), Max: 36.9 (22 @ 05:08)  HR: 59 (22 @ 05:08) (59 - 78)  BP: 157/68 (22 @ 05:08) (144/58 - 158/68)  RR: 18 (22 @ 05:08) (18 - 20)  SpO2: 96% (22 @ 05:08) (85% - 97%)    GENERAL: NAD, lying in bed comfortably  HEAD:  Normocephalic  EYES: Sclera clear  ENT: Moist mucous membranes  NECK: Supple, No JVD  CHEST/LUNG: Clear to auscultation bilaterally; No rales, rhonchi, wheezing, or rubs. Unlabored respirations  HEART: Regular rate and rhythm; No murmurs, rubs, or gallops  ABDOMEN: BSx4; Soft, nontender, nondistended  EXTREMITIES:  2+ Peripheral Pulses, brisk capillary refill. Significant bl LE edema, however, much improved since admission.   NERVOUS SYSTEM:  A&Ox3, no focal deficits   SKIN: chronic venous stasis dermatitis    Labs      139  |  101  |  62<H>  ----------------------------<  89  4.5   |  24  |  2.02<H>    Ca    9.3      19 Dec 2022 09:35  Phos  5.2       Mg     2.3         TPro  7.1  /  Alb  3.8  /  TBili  0.6  /  DBili  x   /  AST  31  /  ALT  35  /  AlkPhos  89  12-    CBC Full  -  ( 19 Dec 2022 09:35 )  WBC Count : 10.02 K/uL  RBC Count : 3.17 M/uL  Hemoglobin : 9.3 g/dL  Hematocrit : 29.8 %  Platelet Count - Automated : 235 K/uL  Mean Cell Volume : 94.0 fl  Mean Cell Hemoglobin : 29.3 pg  Mean Cell Hemoglobin Concentration : 31.2 gm/dL  Auto Neutrophil # : x  Auto Lymphocyte # : x  Auto Monocyte # : x  Auto Eosinophil # : x  Auto Basophil # : x  Auto Neutrophil % : x  Auto Lymphocyte % : x  Auto Monocyte % : x  Auto Eosinophil % : x  Auto Basophil % : x          PT/INR - ( 19 Dec 2022 09:35 )   PT: 14.3 sec;   INR: 1.23 ratio         PTT - ( 19 Dec 2022 09:35 )  PTT:30.6 sec    POC Glucose  CAPILLARY BLOOD GLUCOSE      POCT Blood Glucose.: 195 mg/dL (19 Dec 2022 21:56)  POCT Blood Glucose.: 169 mg/dL (19 Dec 2022 16:53)  POCT Blood Glucose.: 246 mg/dL (19 Dec 2022 13:16)  POCT Blood Glucose.: 119 mg/dL (19 Dec 2022 09:28)  POCT Blood Glucose.: 97 mg/dL (19 Dec 2022 09:10)  POCT Blood Glucose.: 63 mg/dL (19 Dec 2022 08:48)  POCT Blood Glucose.: 63 mg/dL (19 Dec 2022 08:47)  POCT Blood Glucose.: 53 mg/dL (19 Dec 2022 08:32)  POCT Blood Glucose.: 53 mg/dL (19 Dec 2022 08:31)      I&O's  I&O's Summary    19 Dec 2022 07:01  -  20 Dec 2022 07:00  --------------------------------------------------------  IN: 450 mL / OUT: 1200 mL / NET: -750 mL        UA (if applicable)  Urinalysis Basic - ( 19 Dec 2022 13:55 )    Color: Light Yellow / Appearance: Clear / S.012 / pH: x  Gluc: x / Ketone: Negative  / Bili: Negative / Urobili: Negative   Blood: x / Protein: 30 mg/dL / Nitrite: Negative   Leuk Esterase: Negative / RBC: 1 /hpf / WBC 0 /HPF   Sq Epi: x / Non Sq Epi: 0 /hpf / Bacteria: Negative        Micro (if applicable)      Imaging (if applicable)    Active Medications  MEDICATIONS  (STANDING):  aspirin enteric coated 81 milliGRAM(s) Oral daily  atorvastatin 80 milliGRAM(s) Oral at bedtime  buMETAnide Injectable 2 milliGRAM(s) IV Push every 12 hours  carvedilol 25 milliGRAM(s) Oral every 12 hours  clopidogrel Tablet 75 milliGRAM(s) Oral daily  clotrimazole/betamethasone Cream 1 Application(s) Topical two times a day  dextrose 5%. 1000 milliLiter(s) (50 mL/Hr) IV Continuous <Continuous>  dextrose 5%. 1000 milliLiter(s) (100 mL/Hr) IV Continuous <Continuous>  dextrose 50% Injectable 25 Gram(s) IV Push once  dextrose 50% Injectable 12.5 Gram(s) IV Push once  dextrose 50% Injectable 25 Gram(s) IV Push once  gabapentin 100 milliGRAM(s) Oral daily  glucagon  Injectable 1 milliGRAM(s) IntraMuscular once  heparin   Injectable 5000 Unit(s) SubCutaneous every 8 hours  hydrALAZINE 100 milliGRAM(s) Oral every 8 hours  insulin glargine Injectable (LANTUS) 35 Unit(s) SubCutaneous at bedtime  insulin lispro (ADMELOG) corrective regimen sliding scale   SubCutaneous at bedtime  insulin lispro (ADMELOG) corrective regimen sliding scale   SubCutaneous three times a day before meals  insulin lispro Injectable (ADMELOG) 10 Unit(s) SubCutaneous three times a day before meals  NIFEdipine XL 60 milliGRAM(s) Oral every 24 hours  spironolactone 50 milliGRAM(s) Oral daily    MEDICATIONS  (PRN):  acetaminophen     Tablet .. 650 milliGRAM(s) Oral every 6 hours PRN Temp greater or equal to 38C (100.4F), Mild Pain (1 - 3)  albuterol/ipratropium for Nebulization 3 milliLiter(s) Nebulizer every 6 hours PRN Shortness of Breath and/or Wheezing  aluminum hydroxide/magnesium hydroxide/simethicone Suspension 30 milliLiter(s) Oral every 4 hours PRN Dyspepsia  bisacodyl 5 milliGRAM(s) Oral every 12 hours PRN Constipation  dextrose Oral Gel 15 Gram(s) Oral once PRN Blood Glucose LESS THAN 70 milliGRAM(s)/deciliter  melatonin 3 milliGRAM(s) Oral at bedtime PRN Insomnia  ondansetron Injectable 4 milliGRAM(s) IV Push every 8 hours PRN Nausea and/or Vomiting  polyethylene glycol 3350 17 Gram(s) Oral two times a day PRN Constipation  senna 2 Tablet(s) Oral at bedtime PRN Constipation  sodium chloride 0.65% Nasal 1 Spray(s) Both Nostrils every 6 hours PRN congestion/dryness

## 2022-12-20 NOTE — PROGRESS NOTE ADULT - PROBLEM SELECTOR PLAN 5
Continuing basal bolus insulin per prior admission levels.  - basal bolus: Lantus 45U QHS, Lispro 10U tid AC, low ISS ACHS per prior admission  - lantus decreased to 35U i/s/o episode of AM hypoglycemia on 12/19

## 2022-12-20 NOTE — PROGRESS NOTE ADULT - SUBJECTIVE AND OBJECTIVE BOX
Cardiology Progress Note  ------------------------------------------------------------------------------------------  SUBJECTIVE:   - No events overnight. Denies CP, SOB or Palpitations.   - continued on IV diuresis, net negative 1.3L over last 24hrs  -------------------------------------------------------------------------------------------  ROS:  CV: chest pain (-), palpitation (-), orthopnea (-), PND (-), edema (-)  PULM: SOB (-), cough (-), wheezing (-), hemoptysis (-).   CONST: fever (-), chills (-) or fatigue (-)  GI: abdominal distension (-), abdominal pain (-) , nausea/vomiting (-), hematemesis, (-), melena (-), hematochezia (-)  : dysuria (-), frequency (-), hematuria (-).   NEURO: numbness (-), weakness (-), dizziness (-)  MSK: myalgia (-), joint pain (-)   SKIN: itching (-), rash (-)  HEENT:  visual changes (-); vertigo or throat pain (-);  neck stiffness (-)   Psych: change in mood (-), anxiety (-), depression (-)     All other review of systems is negative unless indicated above.   -------------------------------------------------------------------------------------------  VS:  T(F): 98.5 (12-20), Max: 98.5 (12-20)  HR: 59 (12-20) (59 - 78)  BP: 157/68 (12-20) (144/58 - 158/68)  RR: 18 (12-20)  SpO2: 96% (12-20)  I&O's Summary    19 Dec 2022 07:01  -  20 Dec 2022 07:00  --------------------------------------------------------  IN: 810 mL / OUT: 2200 mL / NET: -1390 mL      ------------------------------------------------------------------------------------------  PHYSICAL EXAM:  GENERAL: NAD  HEAD:  Atraumatic, Normocephalic.  EYES: EOMI, PERRLA, conjunctiva and sclera clear.  ENT: Moist mucous membranes.  NECK: Supple, +JVD up to mandible at 30 degrees.  CHEST/LUNG: Crackles at bases b/l.  HEART: Regular rate and rhythm; No murmurs, rubs, or gallops.  ABDOMEN: Bowel sounds present; Soft, Nontender, Nondistended.   EXTREMITIES: 2+ edema up to at least thighs b/l. 2+ Peripheral Pulses, brisk capillary refill. No clubbing or cyanosis.   PSYCH: Normal affect.  SKIN: No rashes or lesions.  -------------------------------------------------------------------------------------------  LABS:                          9.4    8.88  )-----------( 237      ( 20 Dec 2022 07:15 )             30.4     12-20    136  |  101  |  61<H>  ----------------------------<  171<H>  5.0   |  24  |  1.89<H>    Ca    9.1      20 Dec 2022 07:13  Phos  4.6     12-20  Mg     2.4     12-20    TPro  7.1  /  Alb  3.8  /  TBili  0.6  /  DBili  x   /  AST  31  /  ALT  35  /  AlkPhos  89  12-19    PT/INR - ( 19 Dec 2022 09:35 )   PT: 14.3 sec;   INR: 1.23 ratio         PTT - ( 19 Dec 2022 09:35 )  PTT:30.6 sec            -------------------------------------------------------------------------------------------  Meds:  acetaminophen     Tablet .. 650 milliGRAM(s) Oral every 6 hours PRN  albuterol/ipratropium for Nebulization 3 milliLiter(s) Nebulizer every 6 hours PRN  aluminum hydroxide/magnesium hydroxide/simethicone Suspension 30 milliLiter(s) Oral every 4 hours PRN  aspirin enteric coated 81 milliGRAM(s) Oral daily  atorvastatin 80 milliGRAM(s) Oral at bedtime  bisacodyl 5 milliGRAM(s) Oral every 12 hours PRN  buMETAnide Injectable 2 milliGRAM(s) IV Push every 12 hours  carvedilol 25 milliGRAM(s) Oral every 12 hours  clopidogrel Tablet 75 milliGRAM(s) Oral daily  clotrimazole/betamethasone Cream 1 Application(s) Topical two times a day  dextrose 5%. 1000 milliLiter(s) IV Continuous <Continuous>  dextrose 5%. 1000 milliLiter(s) IV Continuous <Continuous>  dextrose 50% Injectable 25 Gram(s) IV Push once  dextrose 50% Injectable 12.5 Gram(s) IV Push once  dextrose 50% Injectable 25 Gram(s) IV Push once  dextrose Oral Gel 15 Gram(s) Oral once PRN  gabapentin 100 milliGRAM(s) Oral daily  glucagon  Injectable 1 milliGRAM(s) IntraMuscular once  heparin   Injectable 5000 Unit(s) SubCutaneous every 8 hours  hydrALAZINE 100 milliGRAM(s) Oral every 8 hours  insulin glargine Injectable (LANTUS) 35 Unit(s) SubCutaneous at bedtime  insulin lispro (ADMELOG) corrective regimen sliding scale   SubCutaneous at bedtime  insulin lispro (ADMELOG) corrective regimen sliding scale   SubCutaneous three times a day before meals  insulin lispro Injectable (ADMELOG) 10 Unit(s) SubCutaneous three times a day before meals  melatonin 3 milliGRAM(s) Oral at bedtime PRN  NIFEdipine XL 60 milliGRAM(s) Oral every 24 hours  ondansetron Injectable 4 milliGRAM(s) IV Push every 8 hours PRN  polyethylene glycol 3350 17 Gram(s) Oral two times a day PRN  senna 2 Tablet(s) Oral at bedtime PRN  sodium chloride 0.65% Nasal 1 Spray(s) Both Nostrils every 6 hours PRN  spironolactone 50 milliGRAM(s) Oral daily    -------------------------------------------------------------------------------------------  Cardiovascular Diagnostic Testing:    Echo:   12/13/22:  Conclusions:  Endocardial visualization enhanced with intravenous  injection of Ultrasonic Enhancing Agent (Definity).  Normal left ventricular systolic function. No segmental  wall motion abnormalities.  Pulmonary hypertension. Estimated PASP 46 mmHg + estimated  right atrial pressure.    CXR:  reviewed  -------------------------------------------------------------------------------------------

## 2022-12-20 NOTE — PROGRESS NOTE ADULT - SUBJECTIVE AND OBJECTIVE BOX
Podiatry pager #: [phone]/ [phone]  Podiatry consulted for evaluation of digital wound    Patient is a 62y old  Male who presents with a chief complaint of HF exacerbation (20 Dec 2022 08:57)      HPI:  62M former smoker w/ HFpEF (TTE 11/14/22 EF 57%), h/o NSTEMI, HTN HLD T2DM on insulin, p/w worsening SOB, leg/scrotal edema, c/f ADHF. Patient was recently admitted 11/11-17/22 as transfer from [facility] for NSTEMI and CHF exacerbation with [facility] deferred pending renal function improvement. Patient was discharged with outpatient follow up but noted that he was told to discontinue diuresis. Upon followup with outpatient cardiology for echo, leg doppler, and nuclear stress test, he was renewed on torsemide 20mg 2 tablets daily and given levofloxacin for his LE cellulitis. ECG during that visit revealed left anterior hemiblock old lateral wall MI and nonspecific ST-T wave abnormalities. Patient developed worsening cough and LE and scrotal edema over the past 2 weeks, causing him to be largely bedbound. Pt denied any fever, chills, n/v/c/d, dysuria, palpitations, chest pain, lightheadedness, LOC, sick contacts, recent travel. Of note, patient was reportedly previously on 100mg of torsemide intermittently in the past.   In the ED, pt was hypertensive 189/76, HR 64, tachypneic 24/min,  SpO2 94% on room air, afebrile. RVP showed Covid+. CXR done showed interstitial pulmonary edema.  (09 Dec 2022 22:00)      PAST MEDICAL & SURGICAL HISTORY:  Type 2 diabetes mellitus      HTN (hypertension)      HLD (hyperlipidemia)      HTN (hypertension)      CAD (coronary artery disease)      DM (diabetes mellitus)      History of cholecystectomy      H/O amputation of lesser toe, right  small toe      S/P amputation of lesser toe, right      History of cholecystectomy          MEDICATIONS  (STANDING):  aspirin enteric coated 81 milliGRAM(s) Oral daily  atorvastatin 80 milliGRAM(s) Oral at bedtime  buMETAnide Injectable 2 milliGRAM(s) IV Push every 12 hours  carvedilol 25 milliGRAM(s) Oral every 12 hours  clopidogrel Tablet 75 milliGRAM(s) Oral daily  clotrimazole/betamethasone Cream 1 Application(s) Topical two times a day  dextrose 5%. 1000 milliLiter(s) (50 mL/Hr) IV Continuous <Continuous>  dextrose 5%. 1000 milliLiter(s) (100 mL/Hr) IV Continuous <Continuous>  dextrose 50% Injectable 25 Gram(s) IV Push once  dextrose 50% Injectable 12.5 Gram(s) IV Push once  dextrose 50% Injectable 25 Gram(s) IV Push once  gabapentin 100 milliGRAM(s) Oral daily  glucagon  Injectable 1 milliGRAM(s) IntraMuscular once  heparin   Injectable 5000 Unit(s) SubCutaneous every 8 hours  hydrALAZINE 100 milliGRAM(s) Oral every 8 hours  insulin glargine Injectable (LANTUS) 35 Unit(s) SubCutaneous at bedtime  insulin lispro (ADMELOG) corrective regimen sliding scale   SubCutaneous at bedtime  insulin lispro (ADMELOG) corrective regimen sliding scale   SubCutaneous three times a day before meals  insulin lispro Injectable (ADMELOG) 10 Unit(s) SubCutaneous three times a day before meals  NIFEdipine XL 60 milliGRAM(s) Oral every 24 hours  spironolactone 50 milliGRAM(s) Oral daily    MEDICATIONS  (PRN):  acetaminophen     Tablet .. 650 milliGRAM(s) Oral every 6 hours PRN Temp greater or equal to 38C (100.4F), Mild Pain (1 - 3)  albuterol/ipratropium for Nebulization 3 milliLiter(s) Nebulizer every 6 hours PRN Shortness of Breath and/or Wheezing  aluminum hydroxide/magnesium hydroxide/simethicone Suspension 30 milliLiter(s) Oral every 4 hours PRN Dyspepsia  bisacodyl 5 milliGRAM(s) Oral every 12 hours PRN Constipation  dextrose Oral Gel 15 Gram(s) Oral once PRN Blood Glucose LESS THAN 70 milliGRAM(s)/deciliter  melatonin 3 milliGRAM(s) Oral at bedtime PRN Insomnia  ondansetron Injectable 4 milliGRAM(s) IV Push every 8 hours PRN Nausea and/or Vomiting  polyethylene glycol 3350 17 Gram(s) Oral two times a day PRN Constipation  senna 2 Tablet(s) Oral at bedtime PRN Constipation  sodium chloride 0.65% Nasal 1 Spray(s) Both Nostrils every 6 hours PRN congestion/dryness      Allergies    No Known Allergies    Intolerances        VITALS:    Vital Signs Last 24 Hrs  T(C): 37.2 (20 Dec 2022 13:46), Max: 37.3 (20 Dec 2022 11:24)  T(F): 99 (20 Dec 2022 13:46), Max: 99.1 (20 Dec 2022 11:24)  HR: 65 (20 Dec 2022 13:46) (59 - 78)  BP: 154/60 (20 Dec 2022 13:46) (149/53 - 158/68)  BP(mean): --  RR: 18 (20 Dec 2022 13:46) (16 - 18)  SpO2: 94% (20 Dec 2022 13:46) (86% - 96%)    Parameters below as of 20 Dec 2022 13:46  Patient On (Oxygen Delivery Method): nasal cannula  O2 Flow (L/min): 2      LABS:                          9.4    8.88  )-----------( 237      ( 20 Dec 2022 07:15 )             30.4       12-20    136  |  101  |  61<H>  ----------------------------<  171<H>  5.0   |  24  |  1.89<H>    Ca    9.1      20 Dec 2022 07:13  Phos  4.6     12-20  Mg     2.4     12-20    TPro  7.1  /  Alb  3.8  /  TBili  0.6  /  DBili  x   /  AST  31  /  ALT  35  /  AlkPhos  89  12-19      CAPILLARY BLOOD GLUCOSE      POCT Blood Glucose.: 190 mg/dL (20 Dec 2022 17:14)  POCT Blood Glucose.: 177 mg/dL (20 Dec 2022 13:09)  POCT Blood Glucose.: 210 mg/dL (20 Dec 2022 08:31)  POCT Blood Glucose.: 195 mg/dL (19 Dec 2022 21:56)      PT/INR - ( 19 Dec 2022 09:35 )   PT: 14.3 sec;   INR: 1.23 ratio         PTT - ( 19 Dec 2022 09:35 )  PTT:30.6 sec    LOWER EXTREMITY PHYSICAL EXAM:    Vasular: DP/PT 1_/4, B/L, CFT <_2 seconds B/L, Temperature gradient _wnl, B/L.   Neuro: Epicritic sensation _diminshed  to the level of toes_, B/L.  Skin:  Wound #1:   Location: left third toe  Size: .8cm diameter  Depth: superficial  Wound bed: dry eschar  Drainage: none  Odor: none  Periwound: no clinical signs of infection  Etiology: present on admission    RADIOLOGY & ADDITIONAL STUDIES:

## 2022-12-20 NOTE — PROGRESS NOTE ADULT - ATTENDING COMMENTS
62M former smoker w/ HFpEF (TTE 11/14/22 EF 57%), h/o NSTEMI, HTN, HLD, T2DM on insulin, p/w worsening SOB, leg/scrotal edema, c/f ADHF, found covid-19 positive.    #DM2: improved BS/ pt with adequate PO intake. decreased basal insulin.   monitor   #Acute on chronic systolic HF: (recent echo with EF 40-45%)   recent hosp ~ 1 month ago. not discharged home on diuretics due to renal dysfunction.   Cont with IV bumex 2mg BID. monitor StrictI+Os. daily weights. keep neg.   eventual plans for further ischemic w/u - possible cath. d/w Dr. Loaiza  #COVID+: clinically with some mild cough. no significant dyspnea    cont have episode of hypoxia intermittently. will likely need O2.   Will monitor off treatment.   #RADHA on CKD3: Cr plateaued likely at baseline now. will monitor while cont with diuresis.   strict I+Os, weight, Cr.

## 2022-12-20 NOTE — PROVIDER CONTACT NOTE (OTHER) - SITUATION
pt O2 has dropped to 71 and fluctuates up to 86%
As per order d/c tele, but tele monitor needed to monitor 02.

## 2022-12-20 NOTE — PROVIDER CONTACT NOTE (OTHER) - RECOMMENDATIONS
Patient tested positive on 12/9/2022. Patient no longer requires isolation at this time as patient is >10 days since testing positive. Isolation discontinued as per hospital guidelines.
Keep NC on during night due to possible undiagnosed apnea

## 2022-12-20 NOTE — CHART NOTE - NSCHARTNOTEFT_GEN_A_CORE
Pt noted to desaturate to SpO2 85% on RA. Placed on 2L NC.     SpO2 on RA, at rest: 86%  SpO2 on O2, at rest: 94  SpO2 on RA, ambulatin%  SpO2 on O2, ambulating: Pt noted to desaturate to SpO2 85% on RA. Placed on 2L NC.     SpO2 on RA, at rest: 86%  SpO2 on O2, at rest: 94%  SpO2 on RA, ambulatin%  SpO2 on O2, ambulating: Pt noted to periodically desaturate to <88% on RA. Subsequent measured values are as follows:     SpO2 on RA, at rest: 86%  SpO2 on O2, at rest: 94%  SpO2 on RA, ambulatin%    Patient unable to be weaned off NC despite continued diuresis. Patient will require home O2 upon d/c 2/2 to congestive heart failure. Pt noted to periodically desaturate to <88% on RA. Subsequent measured values are as follows:     SpO2 on RA, at rest: 86%  SpO2 on O2, at rest: 94%  SpO2 on RA, ambulatin%    Patient unable to be weaned off NC despite continued diuresis. Patient will require home O2 upon d/c 2/2 to congestive heart failure. Will require 2L at home.

## 2022-12-20 NOTE — PROGRESS NOTE ADULT - ASSESSMENT
62M with CKD, DM, HTN, recent NSTEMI medically treated, presenting with b/l LE and abdominal swelling as well as CORCORAN in setting of not being on diuretics. Has been treated with IV diuresis with clinical improvement but remains volume overloaded. Also COVID+ and in isolation.     1. Acute decompensated HF:   - likely due to non-compliance with diuretics.   - EF is normal, no segmental LV dysfunction  - continue bumex 2mg q12hr, goal net negative 1-2L  - I/O's, daily weights  - replete K4Mg2  - continue coreg, aldactone at current dose. Monitor creatinine closely. Given renal function, will hold off on SGLT2i at this time.     2. HLD:  - continue atorvastatin     3. Recent NSTEMI: treated medically last admission given elevated Cr with plan for outpt [facility] once Cr improved  - c/w home aspirin, plavix  - c/w home atorvastatin    4. HTN:  - continue coreg, aldactone, hydralazine and nifedipine at current dose    5. Elevated Cr: on admission 1.80, has remained stable with diuresis  - Elevated Cr may represent new baseline/CKD  - consider renal consult for assistance in care    Andrew Juan MD  Cardiology Fellow - PGY4    Please check [email or web address]: [email or web address] for cardiology service schedule and contact information.

## 2022-12-21 LAB
ANION GAP SERPL CALC-SCNC: 12 MMOL/L — SIGNIFICANT CHANGE UP (ref 5–17)
APTT BLD: 30.3 SEC — SIGNIFICANT CHANGE UP (ref 27.5–35.5)
BUN SERPL-MCNC: 64 MG/DL — HIGH (ref 7–23)
CALCIUM SERPL-MCNC: 9.3 MG/DL — SIGNIFICANT CHANGE UP (ref 8.4–10.5)
CHLORIDE SERPL-SCNC: 101 MMOL/L — SIGNIFICANT CHANGE UP (ref 96–108)
CO2 SERPL-SCNC: 25 MMOL/L — SIGNIFICANT CHANGE UP (ref 22–31)
CREAT SERPL-MCNC: 2.11 MG/DL — HIGH (ref 0.5–1.3)
EGFR: 35 ML/MIN/1.73M2 — LOW
GLUCOSE BLDC GLUCOMTR-MCNC: 133 MG/DL — HIGH (ref 70–99)
GLUCOSE BLDC GLUCOMTR-MCNC: 140 MG/DL — HIGH (ref 70–99)
GLUCOSE BLDC GLUCOMTR-MCNC: 195 MG/DL — HIGH (ref 70–99)
GLUCOSE BLDC GLUCOMTR-MCNC: 221 MG/DL — HIGH (ref 70–99)
GLUCOSE SERPL-MCNC: 141 MG/DL — HIGH (ref 70–99)
HCT VFR BLD CALC: 28.7 % — LOW (ref 39–50)
HGB BLD-MCNC: 9 G/DL — LOW (ref 13–17)
INR BLD: 1.2 RATIO — HIGH (ref 0.88–1.16)
MAGNESIUM SERPL-MCNC: 2.3 MG/DL — SIGNIFICANT CHANGE UP (ref 1.6–2.6)
MCHC RBC-ENTMCNC: 30.1 PG — SIGNIFICANT CHANGE UP (ref 27–34)
MCHC RBC-ENTMCNC: 31.4 GM/DL — LOW (ref 32–36)
MCV RBC AUTO: 96 FL — SIGNIFICANT CHANGE UP (ref 80–100)
NRBC # BLD: 0 /100 WBCS — SIGNIFICANT CHANGE UP (ref 0–0)
PHOSPHATE SERPL-MCNC: 4.6 MG/DL — HIGH (ref 2.5–4.5)
PLATELET # BLD AUTO: 215 K/UL — SIGNIFICANT CHANGE UP (ref 150–400)
POTASSIUM SERPL-MCNC: 5.2 MMOL/L — SIGNIFICANT CHANGE UP (ref 3.5–5.3)
POTASSIUM SERPL-SCNC: 5.2 MMOL/L — SIGNIFICANT CHANGE UP (ref 3.5–5.3)
PROTHROM AB SERPL-ACNC: 14 SEC — HIGH (ref 10.5–13.4)
RBC # BLD: 2.99 M/UL — LOW (ref 4.2–5.8)
RBC # FLD: 14.6 % — HIGH (ref 10.3–14.5)
SODIUM SERPL-SCNC: 138 MMOL/L — SIGNIFICANT CHANGE UP (ref 135–145)
WBC # BLD: 9.14 K/UL — SIGNIFICANT CHANGE UP (ref 3.8–10.5)
WBC # FLD AUTO: 9.14 K/UL — SIGNIFICANT CHANGE UP (ref 3.8–10.5)

## 2022-12-21 PROCEDURE — 99233 SBSQ HOSP IP/OBS HIGH 50: CPT

## 2022-12-21 PROCEDURE — 93458 L HRT ARTERY/VENTRICLE ANGIO: CPT | Mod: 26

## 2022-12-21 PROCEDURE — 99152 MOD SED SAME PHYS/QHP 5/>YRS: CPT

## 2022-12-21 PROCEDURE — 99233 SBSQ HOSP IP/OBS HIGH 50: CPT | Mod: GC

## 2022-12-21 RX ORDER — INSULIN GLARGINE 100 [IU]/ML
40 INJECTION, SOLUTION SUBCUTANEOUS AT BEDTIME
Refills: 0 | Status: DISCONTINUED | OUTPATIENT
Start: 2022-12-21 | End: 2022-12-23

## 2022-12-21 RX ORDER — INSULIN LISPRO 100/ML
5 VIAL (ML) SUBCUTANEOUS ONCE
Refills: 0 | Status: COMPLETED | OUTPATIENT
Start: 2022-12-21 | End: 2022-12-21

## 2022-12-21 RX ORDER — CHLORHEXIDINE GLUCONATE 213 G/1000ML
1 SOLUTION TOPICAL DAILY
Refills: 0 | Status: DISCONTINUED | OUTPATIENT
Start: 2022-12-21 | End: 2022-12-23

## 2022-12-21 RX ADMIN — GABAPENTIN 100 MILLIGRAM(S): 400 CAPSULE ORAL at 11:24

## 2022-12-21 RX ADMIN — ATORVASTATIN CALCIUM 80 MILLIGRAM(S): 80 TABLET, FILM COATED ORAL at 22:30

## 2022-12-21 RX ADMIN — Medication 1: at 13:11

## 2022-12-21 RX ADMIN — Medication 60 MILLIGRAM(S): at 06:36

## 2022-12-21 RX ADMIN — INSULIN GLARGINE 40 UNIT(S): 100 INJECTION, SOLUTION SUBCUTANEOUS at 22:23

## 2022-12-21 RX ADMIN — CARVEDILOL PHOSPHATE 25 MILLIGRAM(S): 80 CAPSULE, EXTENDED RELEASE ORAL at 22:23

## 2022-12-21 RX ADMIN — HEPARIN SODIUM 5000 UNIT(S): 5000 INJECTION INTRAVENOUS; SUBCUTANEOUS at 06:35

## 2022-12-21 RX ADMIN — Medication 100 MILLIGRAM(S): at 13:13

## 2022-12-21 RX ADMIN — CLOTRIMAZOLE AND BETAMETHASONE DIPROPIONATE 1 APPLICATION(S): 10; .5 CREAM TOPICAL at 22:22

## 2022-12-21 RX ADMIN — CLOPIDOGREL BISULFATE 75 MILLIGRAM(S): 75 TABLET, FILM COATED ORAL at 11:27

## 2022-12-21 RX ADMIN — Medication 100 MILLIGRAM(S): at 22:30

## 2022-12-21 RX ADMIN — Medication 5 UNIT(S): at 13:12

## 2022-12-21 RX ADMIN — CLOTRIMAZOLE AND BETAMETHASONE DIPROPIONATE 1 APPLICATION(S): 10; .5 CREAM TOPICAL at 06:35

## 2022-12-21 RX ADMIN — BUMETANIDE 2 MILLIGRAM(S): 0.25 INJECTION INTRAMUSCULAR; INTRAVENOUS at 22:21

## 2022-12-21 RX ADMIN — Medication 81 MILLIGRAM(S): at 11:24

## 2022-12-21 RX ADMIN — SPIRONOLACTONE 50 MILLIGRAM(S): 25 TABLET, FILM COATED ORAL at 06:36

## 2022-12-21 RX ADMIN — BUMETANIDE 2 MILLIGRAM(S): 0.25 INJECTION INTRAMUSCULAR; INTRAVENOUS at 06:35

## 2022-12-21 RX ADMIN — HEPARIN SODIUM 5000 UNIT(S): 5000 INJECTION INTRAVENOUS; SUBCUTANEOUS at 22:22

## 2022-12-21 RX ADMIN — Medication 10 UNIT(S): at 09:06

## 2022-12-21 NOTE — PROGRESS NOTE ADULT - PROBLEM SELECTOR PLAN 5
Continuing basal bolus insulin per prior admission levels.  - basal bolus: Lantus 45U QHS, Lispro 10U tid AC, low ISS ACHS per prior admission  - lantus decreased to 35U i/s/o episode of AM hypoglycemia on 12/19       - increased back to 40U for 12/22 with plans to further increase back to 45 the following day if BG OK

## 2022-12-21 NOTE — PROGRESS NOTE ADULT - ASSESSMENT
62M with CKD, DM, HTN, recent NSTEMI medically treated, presenting with b/l LE and abdominal swelling as well as CORCORAN in setting of not being on diuretics. Has been treated with IV diuresis, remains volume overloaded but with clinical improvement. Also COVID+, completed isolation.    1. Acute decompensated HF:   - likely due to non-compliance with diuretics.   - EF is normal, no segmental LV dysfunction  - continue bumex 2mg q12hr, goal net negative 1-2L  - I/O's, daily weights  - replete K4Mg2  - continue coreg 25 q12  - continue spironolactone 50 daily  - Monitor creatinine closely. Given renal function, will hold off on SGLT2i at this time.     2. HLD:  - continue atorvastatin 80    3. Recent NSTEMI: treated medically last admission given elevated Cr with plan for outpt [facility] once Cr improved  - c/w home aspirin 81, plavix 75  - c/w home atorvastatin 80    4. HTN:  - continue coreg, aldactone as above  - continue hydralazine 100q8  - continue nifedipine 60 daily    5. Elevated Cr: on admission 1.80, has remained stable with diuresis  - Elevated Cr may represent new baseline/CKD  - nephrology consulted, appreciate assistance in care    Andrew Juan MD  Cardiology Fellow - PGY4    Please check [email or web address]: [email or web address]" for cardiology service schedule and contact information.    62M with obesity, HFpEF, CKD, DM, HTN, recent NSTEMI medically treated, presenting with b/l LE and abdominal swelling as well as CORCORAN in setting of not being on diuretics. Has been treated with IV diuresis, remains volume overloaded but with clinical improvement. Also COVID+, completed isolation.    1. Acute HFpEF:   - likely due to non-compliance with diuretics.   - EF is normal, no segmental LV dysfunction  - continue bumex 2mg q12hr, goal net negative 1-2L  - I/O's, daily weights  - replete K4Mg2  - continue coreg 25 q12  - continue spironolactone 50 daily  - Monitor creatinine closely. Given renal function, will hold off on SGLT2i at this time.     2. HLD:  - continue atorvastatin 80    3. Recent NSTEMI: treated medically last admission given elevated Cr with plan for outpt [facility] once Cr improved  - c/w home aspirin 81, plavix 75  - c/w home atorvastatin 80    4. HTN:  - continue coreg, aldactone as above  - continue hydralazine 100q8  - continue nifedipine 60 daily    5. Elevated Cr: on admission 1.80, has remained stable with diuresis  - Elevated Cr may represent new baseline/CKD  - nephrology consulted, appreciate assistance in care    Andrew Juan MD  Cardiology Fellow - PGY4    Please check [email or web address]: [email or web address] for cardiology service schedule and contact information.

## 2022-12-21 NOTE — PROGRESS NOTE ADULT - ATTENDING COMMENTS
62M former smoker w/ HFpEF (TTE 11/14/22 EF 57%), h/o NSTEMI, HTN, HLD, T2DM on insulin, p/w worsening SOB, leg/scrotal edema, c/f ADHF, found covid-19 positive.    #Acute on chronic systolic HF: (recent echo with EF 40-45%)   recent hosp ~ 1 month ago. not discharged home on diuretics due to renal dysfunction.   Cont with IV bumex 2mg BID. monitor StrictI+Os. daily weights. keep neg.   eventual plans for further ischemic w/u - possible cath. d/w Dr. Loaiza  #COVID+: clinically with some mild cough. no significant dyspnea    cont have episode of hypoxia intermittently. will likely need O2.   Will monitor off treatment.   #RADHA on CKD3: Cr plateaued likely at baseline now. will monitor while cont with diuresis.   strict I+Os, weight, Cr.  #DM2: improved BS/ pt with adequate PO intake. titrate basal insulin.

## 2022-12-21 NOTE — PROGRESS NOTE ADULT - SUBJECTIVE AND OBJECTIVE BOX
Candida Castro MD  Internal Medicine, PGY-1        Patient Name: MARY ROBIN  Patient MRN: [id number]    Patient is a 62y old  Male who presents with a chief complaint of HF exacerbation (20 Dec 2022 17:22)      **SUBJECTIVE**    Last 24 hours: No acute events overnight. Patient states feels well, continues to see improvement in swelling. Expresses desire to go home for holidays.     Review of Systems  Constitutional: No weakness, fevers, or chills  HEENT: No headache, visual changes, lightheadedness, or sore throat  Respiratory: No shortness of breath, cough, wheezing, or hemoptysis  Cardiovascular: No chest pain or palpitations  GI: No abdominal or epigastric pain. No nausea, vomiting, or change in bowel habits. No hematemesis or hematochezia  Neuro: No numbness or weakness  Skin: No itching or rashes      **OBJECTIVE**        VITALS:   T(C): 36.8 (22 @ 04:41), Max: 37.3 (22 @ 11:24)  HR: 56 (22 @ 04:41) (56 - 68)  BP: 156/66 (22 @ 04:41) (145/62 - 156/66)  RR: 18 (22 @ 04:41) (16 - 18)  SpO2: 97% (22 @ 04:41) (86% - 97%)    GENERAL: NAD, lying in bed comfortably  HEAD:  Normocephalic  EYES: Sclera clear  ENT: Moist mucous membranes  NECK: Supple, No JVD  CHEST/LUNG: Clear to auscultation bilaterally; No rales, rhonchi, wheezing, or rubs. Unlabored respirations  HEART: Regular rate and rhythm; No murmurs, rubs, or gallops  ABDOMEN: BSx4; Soft, nontender, nondistended  EXTREMITIES:  b/l LE edema, improved from prior exam  NERVOUS SYSTEM:  A&Ox3, no focal deficits   SKIN: chronic venous stasis dermatitis     Labs      136  |  101  |  61<H>  ----------------------------<  171<H>  5.0   |  24  |  1.89<H>    Ca    9.1      20 Dec 2022 07:13  Phos  4.6     12-  Mg     2.4     12-    TPro  7.1  /  Alb  3.8  /  TBili  0.6  /  DBili  x   /  AST  31  /  ALT  35  /  AlkPhos  89  12-    CBC Full  -  ( 20 Dec 2022 07:15 )  WBC Count : 8.88 K/uL  RBC Count : 3.19 M/uL  Hemoglobin : 9.4 g/dL  Hematocrit : 30.4 %  Platelet Count - Automated : 237 K/uL  Mean Cell Volume : 95.3 fl  Mean Cell Hemoglobin : 29.5 pg  Mean Cell Hemoglobin Concentration : 30.9 gm/dL  Auto Neutrophil # : x  Auto Lymphocyte # : x  Auto Monocyte # : x  Auto Eosinophil # : x  Auto Basophil # : x  Auto Neutrophil % : x  Auto Lymphocyte % : x  Auto Monocyte % : x  Auto Eosinophil % : x  Auto Basophil % : x          PT/INR - ( 19 Dec 2022 09:35 )   PT: 14.3 sec;   INR: 1.23 ratio         PTT - ( 19 Dec 2022 09:35 )  PTT:30.6 sec    POC Glucose  CAPILLARY BLOOD GLUCOSE      POCT Blood Glucose.: 203 mg/dL (20 Dec 2022 21:56)  POCT Blood Glucose.: 190 mg/dL (20 Dec 2022 17:14)  POCT Blood Glucose.: 177 mg/dL (20 Dec 2022 13:09)  POCT Blood Glucose.: 210 mg/dL (20 Dec 2022 08:31)      I&O's  I&O's Summary    20 Dec 2022 07:01  -  21 Dec 2022 07:00  --------------------------------------------------------  IN: 240 mL / OUT: 650 mL / NET: -410 mL        UA (if applicable)  Urinalysis Basic - ( 19 Dec 2022 13:55 )    Color: Light Yellow / Appearance: Clear / S.012 / pH: x  Gluc: x / Ketone: Negative  / Bili: Negative / Urobili: Negative   Blood: x / Protein: 30 mg/dL / Nitrite: Negative   Leuk Esterase: Negative / RBC: 1 /hpf / WBC 0 /HPF   Sq Epi: x / Non Sq Epi: 0 /hpf / Bacteria: Negative        Micro (if applicable)      Imaging (if applicable)    Active Medications  MEDICATIONS  (STANDING):  aspirin enteric coated 81 milliGRAM(s) Oral daily  atorvastatin 80 milliGRAM(s) Oral at bedtime  buMETAnide Injectable 2 milliGRAM(s) IV Push every 12 hours  carvedilol 25 milliGRAM(s) Oral every 12 hours  clopidogrel Tablet 75 milliGRAM(s) Oral daily  clotrimazole/betamethasone Cream 1 Application(s) Topical two times a day  dextrose 5%. 1000 milliLiter(s) (100 mL/Hr) IV Continuous <Continuous>  dextrose 5%. 1000 milliLiter(s) (50 mL/Hr) IV Continuous <Continuous>  dextrose 50% Injectable 25 Gram(s) IV Push once  dextrose 50% Injectable 25 Gram(s) IV Push once  dextrose 50% Injectable 12.5 Gram(s) IV Push once  gabapentin 100 milliGRAM(s) Oral daily  glucagon  Injectable 1 milliGRAM(s) IntraMuscular once  heparin   Injectable 5000 Unit(s) SubCutaneous every 8 hours  hydrALAZINE 100 milliGRAM(s) Oral every 8 hours  insulin glargine Injectable (LANTUS) 40 Unit(s) SubCutaneous at bedtime  insulin lispro (ADMELOG) corrective regimen sliding scale   SubCutaneous at bedtime  insulin lispro (ADMELOG) corrective regimen sliding scale   SubCutaneous three times a day before meals  insulin lispro Injectable (ADMELOG) 10 Unit(s) SubCutaneous three times a day before meals  NIFEdipine XL 60 milliGRAM(s) Oral every 24 hours  spironolactone 50 milliGRAM(s) Oral daily    MEDICATIONS  (PRN):  acetaminophen     Tablet .. 650 milliGRAM(s) Oral every 6 hours PRN Temp greater or equal to 38C (100.4F), Mild Pain (1 - 3)  albuterol/ipratropium for Nebulization 3 milliLiter(s) Nebulizer every 6 hours PRN Shortness of Breath and/or Wheezing  aluminum hydroxide/magnesium hydroxide/simethicone Suspension 30 milliLiter(s) Oral every 4 hours PRN Dyspepsia  bisacodyl 5 milliGRAM(s) Oral every 12 hours PRN Constipation  dextrose Oral Gel 15 Gram(s) Oral once PRN Blood Glucose LESS THAN 70 milliGRAM(s)/deciliter  melatonin 3 milliGRAM(s) Oral at bedtime PRN Insomnia  ondansetron Injectable 4 milliGRAM(s) IV Push every 8 hours PRN Nausea and/or Vomiting  polyethylene glycol 3350 17 Gram(s) Oral two times a day PRN Constipation  senna 2 Tablet(s) Oral at bedtime PRN Constipation  sodium chloride 0.65% Nasal 1 Spray(s) Both Nostrils every 6 hours PRN congestion/dryness

## 2022-12-21 NOTE — PROGRESS NOTE ADULT - PROBLEM SELECTOR PLAN 1
NSTEMI last month w/ LHC deferred d/t poor renal function. Now p/w ADHF. Currently following w/ Dr. Shaik Thomson  - c/w diuresis w/ IV bumex 2 bid, goal 2-2.5L per day  - strict I/O, 1L fluid restriction, daily weights  - cards recs: continue with diuresis, will consider LHC when more euvolemic (potentially thursday 12/22)

## 2022-12-21 NOTE — PROGRESS NOTE ADULT - ASSESSMENT
CKD III (SCr 1.8-2, GFR 40s) with proteinuria 1 gram  HFpEF, CAD  Hypervolemia    Inadequate weight loss, would benefit from more aggressive diuresis:  - Increase bumetanide IV to 4mg Q12H  - Continue spironolactone  - Continue carvedilol 25mg BID  - I see no reason not to start SGLT2i Farxiga/dapagliflozin 10mg daily as it may also help with diuresis and borderline hyperkalemia  - Please check urine albumin/creatinine ratio  - Favor switching from hydralazine to ARB once potassium better controlled  - Nifedipine XL 60mg daily >> would favor switch to amlodipine as longer acting  - Atorvastatin  - Daily weight on standing scale  - Outpatient nephrology follow up  - Will follow

## 2022-12-21 NOTE — PROGRESS NOTE ADULT - SUBJECTIVE AND OBJECTIVE BOX
Cardiology Progress Note  ------------------------------------------------------------------------------------------  SUBJECTIVE:   - No events overnight. Denies CP, SOB or Palpitations.   -------------------------------------------------------------------------------------------  ROS:  CV: chest pain (-), palpitation (-), orthopnea (-), PND (-), edema (-)  PULM: SOB (-), cough (-), wheezing (-), hemoptysis (-).   CONST: fever (-), chills (-) or fatigue (-)  GI: abdominal distension (-), abdominal pain (-) , nausea/vomiting (-), hematemesis, (-), melena (-), hematochezia (-)  : dysuria (-), frequency (-), hematuria (-).   NEURO: numbness (-), weakness (-), dizziness (-)  MSK: myalgia (-), joint pain (-)   SKIN: itching (-), rash (-)  HEENT:  visual changes (-); vertigo or throat pain (-);  neck stiffness (-)   Psych: change in mood (-), anxiety (-), depression (-)     All other review of systems is negative unless indicated above.   -------------------------------------------------------------------------------------------  VS:  T(F): 98.2 (12-21), Max: 99.1 (12-20)  HR: 56 (12-21) (56 - 68)  BP: 156/66 (12-21) (145/62 - 156/66)  RR: 18 (12-21)  SpO2: 97% (12-21)  I&O's Summary    20 Dec 2022 07:01  -  21 Dec 2022 07:00  --------------------------------------------------------  IN: 240 mL / OUT: 650 mL / NET: -410 mL      ------------------------------------------------------------------------------------------  PHYSICAL EXAM:  GENERAL: NAD  HEAD:  Atraumatic, Normocephalic.  EYES: EOMI, PERRLA, conjunctiva and sclera clear.  ENT: Moist mucous membranes.  NECK: Supple, +JVD up to mandible at 30 degrees.  CHEST/LUNG: Crackles at bases b/l.  HEART: Regular rate and rhythm; No murmurs, rubs, or gallops.  ABDOMEN: Bowel sounds present; Soft, Nontender, Nondistended.   EXTREMITIES: 2+ edema up to upper shins b/l, improved compared to yesterday. 2+ Peripheral Pulses, brisk capillary refill. No clubbing or cyanosis.   PSYCH: Normal affect.  SKIN: No rashes or lesions.  -------------------------------------------------------------------------------------------  LABS:                          9.4    8.88  )-----------( 237      ( 20 Dec 2022 07:15 )             30.4     12-20    136  |  101  |  61<H>  ----------------------------<  171<H>  5.0   |  24  |  1.89<H>    Ca    9.1      20 Dec 2022 07:13  Phos  4.6     12-20  Mg     2.4     12-20    TPro  7.1  /  Alb  3.8  /  TBili  0.6  /  DBili  x   /  AST  31  /  ALT  35  /  AlkPhos  89  12-19    PT/INR - ( 19 Dec 2022 09:35 )   PT: 14.3 sec;   INR: 1.23 ratio         PTT - ( 19 Dec 2022 09:35 )  PTT:30.6 sec        -------------------------------------------------------------------------------------------  Cardiovascular Diagnostic Testing:    Echo:   12/13/22:  Conclusions:  Endocardial visualization enhanced with intravenous  injection of Ultrasonic Enhancing Agent (Definity).  Normal left ventricular systolic function. No segmental  wall motion abnormalities.  Pulmonary hypertension. Estimated PASP 46 mmHg + estimated  right atrial pressure.    CXR:  reviewed  -------------------------------------------------------------------------------------------

## 2022-12-21 NOTE — PROGRESS NOTE ADULT - SUBJECTIVE AND OBJECTIVE BOX
[facility] Division of Kidney Diseases & Hypertension  FOLLOW UP NOTE  --------------------------------------------------------------------------------  24 hour events/subjective:  - Continuing with diuresis  - Wt 122.3, essentially stable from yesterday  - Itching to go home for [place]    PAST HISTORY  --------------------------------------------------------------------------------  No significant changes to PMH, PSH, FHx, SHx, unless otherwise noted    ALLERGIES & MEDICATIONS  --------------------------------------------------------------------------------  Allergies: No Known Allergies    Standing Inpatient Medications  aspirin enteric coated 81 milliGRAM(s) Oral daily  atorvastatin 80 milliGRAM(s) Oral at bedtime  buMETAnide Injectable 2 milliGRAM(s) IV Push every 12 hours  carvedilol 25 milliGRAM(s) Oral every 12 hours  clopidogrel Tablet 75 milliGRAM(s) Oral daily  clotrimazole/betamethasone Cream 1 Application(s) Topical two times a day  dextrose 5%. 1000 milliLiter(s) IV Continuous <Continuous>  dextrose 5%. 1000 milliLiter(s) IV Continuous <Continuous>  dextrose 50% Injectable 25 Gram(s) IV Push once  dextrose 50% Injectable 12.5 Gram(s) IV Push once  dextrose 50% Injectable 25 Gram(s) IV Push once  gabapentin 100 milliGRAM(s) Oral daily  glucagon  Injectable 1 milliGRAM(s) IntraMuscular once  heparin   Injectable 5000 Unit(s) SubCutaneous every 8 hours  hydrALAZINE 100 milliGRAM(s) Oral every 8 hours  insulin glargine Injectable (LANTUS) 40 Unit(s) SubCutaneous at bedtime  insulin lispro (ADMELOG) corrective regimen sliding scale   SubCutaneous at bedtime  insulin lispro (ADMELOG) corrective regimen sliding scale   SubCutaneous three times a day before meals  insulin lispro Injectable (ADMELOG) 10 Unit(s) SubCutaneous three times a day before meals  NIFEdipine XL 60 milliGRAM(s) Oral every 24 hours  spironolactone 50 milliGRAM(s) Oral daily    PRN Inpatient Medications  acetaminophen     Tablet .. 650 milliGRAM(s) Oral every 6 hours PRN  albuterol/ipratropium for Nebulization 3 milliLiter(s) Nebulizer every 6 hours PRN  aluminum hydroxide/magnesium hydroxide/simethicone Suspension 30 milliLiter(s) Oral every 4 hours PRN  bisacodyl 5 milliGRAM(s) Oral every 12 hours PRN  dextrose Oral Gel 15 Gram(s) Oral once PRN  melatonin 3 milliGRAM(s) Oral at bedtime PRN  ondansetron Injectable 4 milliGRAM(s) IV Push every 8 hours PRN  polyethylene glycol 3350 17 Gram(s) Oral two times a day PRN  senna 2 Tablet(s) Oral at bedtime PRN  sodium chloride 0.65% Nasal 1 Spray(s) Both Nostrils every 6 hours PRN    REVIEW OF SYSTEMS  --------------------------------------------------------------------------------  All other systems were reviewed and are negative, except as noted.    VITALS/PHYSICAL EXAM  --------------------------------------------------------------------------------  T(C): 36.3 (12-21-22 @ 12:06), Max: 37.2 (12-20-22 @ 20:48)  HR: 58 (12-21-22 @ 12:06) (56 - 68)  BP: 151/61 (12-21-22 @ 12:06) (145/62 - 156/66)  RR: 18 (12-21-22 @ 12:06) (18 - 18)  SpO2: 97% (12-21-22 @ 12:06) (95% - 97%)    Physical Exam:  	Gen: NAD  	HEENT: Supple neck  	Pulm: CTA  	CV: RRR  	Abd: +BS, soft  	: No suprapubic tenderness  	UE: Warm, FROM  	LE: Warm, FROM, +edema  	Neuro: No focal deficits  	Psych: Normal affect and mood  	Skin: Warm, without rashes    LABS/STUDIES  --------------------------------------------------------------------------------              9.0    9.14  >-----------<  215      [12-21-22 @ 09:07]              28.7     138  |  101  |  64  ----------------------------<  141      [12-21-22 @ 09:07]  5.2   |  25  |  2.11        Ca     9.3     [12-21-22 @ 09:07]      Mg     2.3     [12-21-22 @ 09:07]      Phos  4.6     [12-21-22 @ 09:07]    Creatinine Trend:  SCr 2.11 [12-21 @ 09:07]  SCr 1.89 [12-20 @ 07:13]  SCr 2.02 [12-19 @ 09:35]  SCr 1.75 [12-17 @ 11:24]  SCr 1.76 [12-16 @ 07:06]  Protein/Creatinine Ratio Calculation: 1.1 Ratio (12.19.22 @ 13:54)   Urinalysis - [12-19-22 @ 13:55]      Color Light Yellow / Appearance Clear / SG 1.012 / pH 5.5      Gluc Negative / Ketone Negative  / Bili Negative / Urobili Negative       Blood Negative / Protein 30 mg/dL / Leuk Est Negative / Nitrite Negative      RBC 1 / WBC 0 / Hyaline 1 / Gran  / Sq Epi  / Non Sq Epi 0 / Bacteria Negative

## 2022-12-22 ENCOUNTER — TRANSCRIPTION ENCOUNTER (OUTPATIENT)
Age: 62
End: 2022-12-22

## 2022-12-22 LAB
ALBUMIN SERPL ELPH-MCNC: 3.9 G/DL — SIGNIFICANT CHANGE UP (ref 3.3–5)
ALP SERPL-CCNC: 97 U/L — SIGNIFICANT CHANGE UP (ref 40–120)
ALT FLD-CCNC: 27 U/L — SIGNIFICANT CHANGE UP (ref 10–45)
ANION GAP SERPL CALC-SCNC: 12 MMOL/L — SIGNIFICANT CHANGE UP (ref 5–17)
APTT BLD: 30.9 SEC — SIGNIFICANT CHANGE UP (ref 27.5–35.5)
AST SERPL-CCNC: 21 U/L — SIGNIFICANT CHANGE UP (ref 10–40)
BILIRUB SERPL-MCNC: 0.7 MG/DL — SIGNIFICANT CHANGE UP (ref 0.2–1.2)
BUN SERPL-MCNC: 58 MG/DL — HIGH (ref 7–23)
CALCIUM SERPL-MCNC: 9.2 MG/DL — SIGNIFICANT CHANGE UP (ref 8.4–10.5)
CHLORIDE SERPL-SCNC: 100 MMOL/L — SIGNIFICANT CHANGE UP (ref 96–108)
CO2 SERPL-SCNC: 25 MMOL/L — SIGNIFICANT CHANGE UP (ref 22–31)
CREAT SERPL-MCNC: 1.91 MG/DL — HIGH (ref 0.5–1.3)
EGFR: 39 ML/MIN/1.73M2 — LOW
GLUCOSE BLDC GLUCOMTR-MCNC: 116 MG/DL — HIGH (ref 70–99)
GLUCOSE BLDC GLUCOMTR-MCNC: 137 MG/DL — HIGH (ref 70–99)
GLUCOSE BLDC GLUCOMTR-MCNC: 194 MG/DL — HIGH (ref 70–99)
GLUCOSE BLDC GLUCOMTR-MCNC: 219 MG/DL — HIGH (ref 70–99)
GLUCOSE SERPL-MCNC: 206 MG/DL — HIGH (ref 70–99)
HCT VFR BLD CALC: 29.2 % — LOW (ref 39–50)
HGB BLD-MCNC: 9 G/DL — LOW (ref 13–17)
INR BLD: 1.2 RATIO — HIGH (ref 0.88–1.16)
MAGNESIUM SERPL-MCNC: 2.3 MG/DL — SIGNIFICANT CHANGE UP (ref 1.6–2.6)
MCHC RBC-ENTMCNC: 29.7 PG — SIGNIFICANT CHANGE UP (ref 27–34)
MCHC RBC-ENTMCNC: 30.8 GM/DL — LOW (ref 32–36)
MCV RBC AUTO: 96.4 FL — SIGNIFICANT CHANGE UP (ref 80–100)
NRBC # BLD: 0 /100 WBCS — SIGNIFICANT CHANGE UP (ref 0–0)
PHOSPHATE SERPL-MCNC: 4.5 MG/DL — SIGNIFICANT CHANGE UP (ref 2.5–4.5)
PLATELET # BLD AUTO: 211 K/UL — SIGNIFICANT CHANGE UP (ref 150–400)
POTASSIUM SERPL-MCNC: 5 MMOL/L — SIGNIFICANT CHANGE UP (ref 3.5–5.3)
POTASSIUM SERPL-SCNC: 5 MMOL/L — SIGNIFICANT CHANGE UP (ref 3.5–5.3)
PROT SERPL-MCNC: 6.9 G/DL — SIGNIFICANT CHANGE UP (ref 6–8.3)
PROTHROM AB SERPL-ACNC: 14 SEC — HIGH (ref 10.5–13.4)
RBC # BLD: 3.03 M/UL — LOW (ref 4.2–5.8)
RBC # FLD: 14.8 % — HIGH (ref 10.3–14.5)
SODIUM SERPL-SCNC: 137 MMOL/L — SIGNIFICANT CHANGE UP (ref 135–145)
WBC # BLD: 8.3 K/UL — SIGNIFICANT CHANGE UP (ref 3.8–10.5)
WBC # FLD AUTO: 8.3 K/UL — SIGNIFICANT CHANGE UP (ref 3.8–10.5)

## 2022-12-22 PROCEDURE — 99233 SBSQ HOSP IP/OBS HIGH 50: CPT | Mod: GC

## 2022-12-22 PROCEDURE — 99233 SBSQ HOSP IP/OBS HIGH 50: CPT

## 2022-12-22 RX ORDER — BUMETANIDE 0.25 MG/ML
2 INJECTION INTRAMUSCULAR; INTRAVENOUS
Refills: 0 | Status: DISCONTINUED | OUTPATIENT
Start: 2022-12-22 | End: 2022-12-22

## 2022-12-22 RX ORDER — BUMETANIDE 0.25 MG/ML
4 INJECTION INTRAMUSCULAR; INTRAVENOUS EVERY 12 HOURS
Refills: 0 | Status: DISCONTINUED | OUTPATIENT
Start: 2022-12-22 | End: 2022-12-23

## 2022-12-22 RX ORDER — DAPAGLIFLOZIN 10 MG/1
10 TABLET, FILM COATED ORAL EVERY 24 HOURS
Refills: 0 | Status: DISCONTINUED | OUTPATIENT
Start: 2022-12-22 | End: 2022-12-22

## 2022-12-22 RX ORDER — DAPAGLIFLOZIN 10 MG/1
1 TABLET, FILM COATED ORAL
Qty: 30 | Refills: 0
Start: 2022-12-22 | End: 2023-01-20

## 2022-12-22 RX ADMIN — CARVEDILOL PHOSPHATE 25 MILLIGRAM(S): 80 CAPSULE, EXTENDED RELEASE ORAL at 08:59

## 2022-12-22 RX ADMIN — CLOTRIMAZOLE AND BETAMETHASONE DIPROPIONATE 1 APPLICATION(S): 10; .5 CREAM TOPICAL at 21:11

## 2022-12-22 RX ADMIN — INSULIN GLARGINE 40 UNIT(S): 100 INJECTION, SOLUTION SUBCUTANEOUS at 23:05

## 2022-12-22 RX ADMIN — Medication 650 MILLIGRAM(S): at 21:42

## 2022-12-22 RX ADMIN — HEPARIN SODIUM 5000 UNIT(S): 5000 INJECTION INTRAVENOUS; SUBCUTANEOUS at 12:58

## 2022-12-22 RX ADMIN — Medication 10 UNIT(S): at 17:18

## 2022-12-22 RX ADMIN — Medication 100 MILLIGRAM(S): at 12:52

## 2022-12-22 RX ADMIN — Medication 650 MILLIGRAM(S): at 10:00

## 2022-12-22 RX ADMIN — SPIRONOLACTONE 50 MILLIGRAM(S): 25 TABLET, FILM COATED ORAL at 05:24

## 2022-12-22 RX ADMIN — HEPARIN SODIUM 5000 UNIT(S): 5000 INJECTION INTRAVENOUS; SUBCUTANEOUS at 21:12

## 2022-12-22 RX ADMIN — CLOTRIMAZOLE AND BETAMETHASONE DIPROPIONATE 1 APPLICATION(S): 10; .5 CREAM TOPICAL at 10:23

## 2022-12-22 RX ADMIN — Medication 650 MILLIGRAM(S): at 21:12

## 2022-12-22 RX ADMIN — GABAPENTIN 100 MILLIGRAM(S): 400 CAPSULE ORAL at 12:51

## 2022-12-22 RX ADMIN — Medication 650 MILLIGRAM(S): at 08:55

## 2022-12-22 RX ADMIN — Medication 1: at 08:56

## 2022-12-22 RX ADMIN — CARVEDILOL PHOSPHATE 25 MILLIGRAM(S): 80 CAPSULE, EXTENDED RELEASE ORAL at 18:19

## 2022-12-22 RX ADMIN — Medication 81 MILLIGRAM(S): at 12:51

## 2022-12-22 RX ADMIN — BUMETANIDE 132 MILLIGRAM(S): 0.25 INJECTION INTRAMUSCULAR; INTRAVENOUS at 17:17

## 2022-12-22 RX ADMIN — CHLORHEXIDINE GLUCONATE 1 APPLICATION(S): 213 SOLUTION TOPICAL at 12:51

## 2022-12-22 RX ADMIN — Medication 100 MILLIGRAM(S): at 05:23

## 2022-12-22 RX ADMIN — Medication 10 UNIT(S): at 08:56

## 2022-12-22 RX ADMIN — ATORVASTATIN CALCIUM 80 MILLIGRAM(S): 80 TABLET, FILM COATED ORAL at 21:11

## 2022-12-22 RX ADMIN — Medication 60 MILLIGRAM(S): at 05:24

## 2022-12-22 RX ADMIN — Medication 10 UNIT(S): at 13:54

## 2022-12-22 RX ADMIN — HEPARIN SODIUM 5000 UNIT(S): 5000 INJECTION INTRAVENOUS; SUBCUTANEOUS at 05:23

## 2022-12-22 RX ADMIN — CLOPIDOGREL BISULFATE 75 MILLIGRAM(S): 75 TABLET, FILM COATED ORAL at 12:51

## 2022-12-22 RX ADMIN — BUMETANIDE 2 MILLIGRAM(S): 0.25 INJECTION INTRAMUSCULAR; INTRAVENOUS at 10:22

## 2022-12-22 NOTE — DISCHARGE NOTE PROVIDER - NSDCFUADDAPPT_GEN_ALL_CORE_FT
APPTS ARE READY TO BE MADE: [x ] YES    Best Family or Patient Contact (if needed):    Additional Information about above appointments (if needed):    1: Primary Care (above) within 2 weeks of d/c   2: One of the cardiologists above) within 1 month of d/c   3:     Other comments or requests:    APPTS ARE READY TO BE MADE: [x ] YES    Best Family or Patient Contact (if needed):    Additional Information about above appointments (if needed):    1: Primary Care (above) within 2 weeks of d/c   2: One of the cardiologists above within 1 month of d/c   3:     Other comments or requests:    APPTS ARE READY TO BE MADE: [x ] YES    Best Family or Patient Contact (if needed):    Additional Information about above appointments (if needed):    1: Primary Care (above) within 2 weeks of d/c   2: One of the cardiologists above within 1 month of d/c   3:     Other comments or requests: Patient was provided with follow up request details and was advised to call to schedule follow up within specified time frame. Spoke with patients wife Selma ([phone]) and she stated that they will make their own appointments.

## 2022-12-22 NOTE — PROGRESS NOTE ADULT - ATTENDING COMMENTS
CKD III (SCr 1.8-2, GFR 40s) with proteinuria 1 gram  HFpEF, CAD  Hypervolemia    S/p LHC with multivessel disease  Weight down 3 kg from yesterday      - Continue aggressive diuresis: bumetanide IV to 4mg Q12H while inpatient (transition to oral torsemide at discharge given its excellent bioavailability and long duration of action)  - Continue spironolactone, carvedilol  - Recommend starting SGLT2i Farxiga/dapagliflozin 10mg daily  - Please check urine albumin/creatinine ratio  - Favor switching from hydralazine to long-acting ARB once potassium better controlled  - Nifedipine XL 60mg daily --- would favor switch to amlodipine 5mg daily as longer acting  - Atorvastatin  - Daily weight on standing scale  - Needs outpatient nephrology follow up  - Will follow

## 2022-12-22 NOTE — PROGRESS NOTE ADULT - PROBLEM SELECTOR PLAN 1
NSTEMI last month w/ [facility] deferred d/t poor renal function. Now p/w ADHF. Currently following w/ Dr. Shaik Thomson  - c/w diuresis w/ IV bumex 2 bid, goal 2-2.5L per day  - strict I/O, 1L fluid restriction, daily weights  - s/p [facility] 12/21  - appreciate cards recs

## 2022-12-22 NOTE — PROGRESS NOTE ADULT - ATTENDING COMMENTS
62M with obesity, HFpEF, CKD, DM, HTN, recent NSTEMI medically treated, presenting with b/l LE and abdominal swelling as well as CORCORAN in setting of not being on diuretics. Has been treated with IV diuresis, volume status improving. Also COVID+, completed isolation. S/p LHC on 12/21/22 that showed MVD. Long discussion with patient and his wife this morning. Patient favors PCI over CABG and this will be coordinated outpatient as he would like to return home for [place]. I have discussed case with Dr. Carlson - interventional cardiology.       Plan:  1. Coordinated outpatient cath- patient will receive a call from cath-booking.   2. Continue DAPT  3. With regards to GDMT, he 62M with obesity, HFpEF, CKD, DM, HTN, recent NSTEMI medically treated, presenting with b/l LE and abdominal swelling as well as CORCORAN in setting of not being on diuretics. Has been treated with IV diuresis, volume status improving. Also COVID+, completed isolation. S/p LHC on 12/21/22 that showed MVD. Long discussion with patient and his wife this morning. Patient favors PCI over CABG and this will be coordinated outpatient as he would like to return home for [place]. I have discussed case with Dr. Carlson - interventional cardiology.       Plan:  1. Coordinated outpatient cath- patient will receive a call from cath-booking.   2. Continue DAPT  3. With regards to GDMT, he should continue coreg, aldactone. Defer to Nephrology regarding diuretics.

## 2022-12-22 NOTE — PROGRESS NOTE ADULT - SUBJECTIVE AND OBJECTIVE BOX
Candida Castro MD  Internal Medicine, PGY-1        Patient Name: MARY ROBIN  Patient MRN: [id number]    Patient is a 62y old  Male who presents with a chief complaint of HF exacerbation (21 Dec 2022 14:25)      **SUBJECTIVE**    Last 24 hours: s/p LHC yesterday. Will discuss results with cards. Pt tolerated well. This AM, patient states feels well. Denies CP, SOB, lightheadedness, palpitations.     Review of Systems  Constitutional: No weakness, fevers, or chills  HEENT: No headache, visual changes, lightheadedness, or sore throat  Respiratory: No shortness of breath, cough, wheezing, or hemoptysis  Cardiovascular: No chest pain or palpitations  GI: No abdominal or epigastric pain. No nausea, vomiting, or change in bowel habits. No hematemesis or hematochezia  Neuro: No numbness or weakness  Skin: No itching or rashes      **OBJECTIVE**        VITALS:   T(C): 36.9 (12-22-22 @ 05:15), Max: 37.1 (12-21-22 @ 20:59)  HR: 68 (12-22-22 @ 05:15) (58 - 68)  BP: 145/73 (12-22-22 @ 05:15) (145/73 - 167/73)  RR: 18 (12-22-22 @ 05:15) (18 - 20)  SpO2: 96% (12-22-22 @ 05:15) (92% - 98%)    GENERAL: NAD, lying in bed comfortably  HEAD:  Normocephalic  EYES: Sclera clear  ENT: Moist mucous membranes  NECK: Supple, No JVD  CHEST/LUNG: Clear to auscultation bilaterally; No rales, rhonchi, wheezing, or rubs. Unlabored respirations  HEART: Regular rate and rhythm; No murmurs, rubs, or gallops  ABDOMEN: BSx4; Soft, nontender, nondistended  EXTREMITIES:  minimal b/l LE swelling  NERVOUS SYSTEM:  A&Ox3, no focal deficits   SKIN: chronic venous stasis dermatitis     Labs  12-21    138  |  101  |  64<H>  ----------------------------<  141<H>  5.2   |  25  |  2.11<H>    Ca    9.3      21 Dec 2022 09:07  Phos  4.6     12-21  Mg     2.3     12-21      CBC Full  -  ( 21 Dec 2022 09:07 )  WBC Count : 9.14 K/uL  RBC Count : 2.99 M/uL  Hemoglobin : 9.0 g/dL  Hematocrit : 28.7 %  Platelet Count - Automated : 215 K/uL  Mean Cell Volume : 96.0 fl  Mean Cell Hemoglobin : 30.1 pg  Mean Cell Hemoglobin Concentration : 31.4 gm/dL  Auto Neutrophil # : x  Auto Lymphocyte # : x  Auto Monocyte # : x  Auto Eosinophil # : x  Auto Basophil # : x  Auto Neutrophil % : x  Auto Lymphocyte % : x  Auto Monocyte % : x  Auto Eosinophil % : x  Auto Basophil % : x          PT/INR - ( 21 Dec 2022 09:07 )   PT: 14.0 sec;   INR: 1.20 ratio         PTT - ( 21 Dec 2022 09:07 )  PTT:30.3 sec    POC Glucose  CAPILLARY BLOOD GLUCOSE      POCT Blood Glucose.: 221 mg/dL (21 Dec 2022 22:08)  POCT Blood Glucose.: 133 mg/dL (21 Dec 2022 19:49)  POCT Blood Glucose.: 195 mg/dL (21 Dec 2022 13:09)  POCT Blood Glucose.: 140 mg/dL (21 Dec 2022 08:59)      I&O's  I&O's Summary    21 Dec 2022 07:01  -  22 Dec 2022 07:00  --------------------------------------------------------  IN: 600 mL / OUT: 2900 mL / NET: -2300 mL        UA (if applicable)      Micro (if applicable)      Imaging (if applicable)    Active Medications  MEDICATIONS  (STANDING):  aspirin enteric coated 81 milliGRAM(s) Oral daily  atorvastatin 80 milliGRAM(s) Oral at bedtime  buMETAnide Injectable 2 milliGRAM(s) IV Push every 12 hours  carvedilol 25 milliGRAM(s) Oral every 12 hours  chlorhexidine 4% Liquid 1 Application(s) Topical daily  clopidogrel Tablet 75 milliGRAM(s) Oral daily  clotrimazole/betamethasone Cream 1 Application(s) Topical two times a day  dextrose 5%. 1000 milliLiter(s) (50 mL/Hr) IV Continuous <Continuous>  dextrose 5%. 1000 milliLiter(s) (100 mL/Hr) IV Continuous <Continuous>  dextrose 50% Injectable 25 Gram(s) IV Push once  dextrose 50% Injectable 12.5 Gram(s) IV Push once  dextrose 50% Injectable 25 Gram(s) IV Push once  gabapentin 100 milliGRAM(s) Oral daily  glucagon  Injectable 1 milliGRAM(s) IntraMuscular once  heparin   Injectable 5000 Unit(s) SubCutaneous every 8 hours  hydrALAZINE 100 milliGRAM(s) Oral every 8 hours  insulin glargine Injectable (LANTUS) 40 Unit(s) SubCutaneous at bedtime  insulin lispro (ADMELOG) corrective regimen sliding scale   SubCutaneous at bedtime  insulin lispro (ADMELOG) corrective regimen sliding scale   SubCutaneous three times a day before meals  insulin lispro Injectable (ADMELOG) 10 Unit(s) SubCutaneous three times a day before meals  NIFEdipine XL 60 milliGRAM(s) Oral every 24 hours  spironolactone 50 milliGRAM(s) Oral daily    MEDICATIONS  (PRN):  acetaminophen     Tablet .. 650 milliGRAM(s) Oral every 6 hours PRN Temp greater or equal to 38C (100.4F), Mild Pain (1 - 3)  albuterol/ipratropium for Nebulization 3 milliLiter(s) Nebulizer every 6 hours PRN Shortness of Breath and/or Wheezing  aluminum hydroxide/magnesium hydroxide/simethicone Suspension 30 milliLiter(s) Oral every 4 hours PRN Dyspepsia  bisacodyl 5 milliGRAM(s) Oral every 12 hours PRN Constipation  dextrose Oral Gel 15 Gram(s) Oral once PRN Blood Glucose LESS THAN 70 milliGRAM(s)/deciliter  melatonin 3 milliGRAM(s) Oral at bedtime PRN Insomnia  ondansetron Injectable 4 milliGRAM(s) IV Push every 8 hours PRN Nausea and/or Vomiting  polyethylene glycol 3350 17 Gram(s) Oral two times a day PRN Constipation  senna 2 Tablet(s) Oral at bedtime PRN Constipation  sodium chloride 0.65% Nasal 1 Spray(s) Both Nostrils every 6 hours PRN congestion/dryness

## 2022-12-22 NOTE — DISCHARGE NOTE PROVIDER - HOSPITAL COURSE
62M former smoker w/ HFpEF (TTE 11/14/22 EF 57%), h/o NSTEMI, HTN HLD T2DM on insulin, p/w worsening SOB, leg/scrotal edema, c/f ADHF. Patient was recently admitted 11/11-17/22 as transfer from [facility] for NSTEMI and CHF exacerbation with LHC deferred pending renal function improvement. Patient was discharged with outpatient follow up but noted that he was told to discontinue diuresis. Upon followup with outpatient cardiology for echo, leg doppler, and nuclear stress test, he was renewed on torsemide 20mg 2 tablets daily and given levofloxacin for his LE cellulitis. ECG during that visit revealed left anterior hemiblock old lateral wall MI and nonspecific ST-T wave abnormalities. Patient developed worsening cough and LE and scrotal edema over the past 2 weeks, causing him to be largely bedbound. Pt denied any fever, chills, n/v/c/d, dysuria, palpitations, chest pain, lightheadedness, LOC, sick contacts, recent travel. Of note, patient was reportedly previously on 100mg of torsemide intermittently in the past.   In the ED, pt was hypertensive 189/76, HR 64, tachypneic 24/min,  SpO2 94% on room air, afebrile. RVP showed Covid+. CXR done showed interstitial pulmonary edema.     Hospital Course: Patient started on diuresis with IV Bumex 2mg BID. Repeat echo with normal LV function. Cardiology consulted and recommended continuing with IV bumex. Patient also breathing comfortably without hypoxia on both room air and 2L NC  for comfort. Neprhology consulted to help establish patient baseline Cr-- state baseline likely around 1.7-1.9. Cleared for potential LHC with cardiology indicated for prior NSTEMI. Patient continued on same diuresis regimen with significant improvement in symptoms and swelling (b/l LE and scrotal). Patient removed crom COVID isolation 12/19. S/p LHC with interventional cardiology 12/21 with no significant stenosis (max 20%). No stent placed or other intervention. Patient tolerated procedure well without new RADHA, chest pain, or SOB. Patient transitioned to PO diuresis with bumex 2mg BID 12/22 in preparation for dc home the following day. Home O2 set up by CM.     Patient medically optimized for d/c home with home O2. 62M former smoker w/ HFpEF (TTE 11/14/22 EF 57%), h/o NSTEMI, HTN HLD T2DM on insulin, p/w worsening SOB, leg/scrotal edema, c/f ADHF. Patient was recently admitted 11/11-17/22 as transfer from [facility] for NSTEMI and CHF exacerbation with LHC deferred pending renal function improvement. Patient was discharged with outpatient follow up but noted that he was told to discontinue diuresis. Upon followup with outpatient cardiology for echo, leg doppler, and nuclear stress test, he was renewed on torsemide 20mg 2 tablets daily and given levofloxacin for his LE cellulitis. ECG during that visit revealed left anterior hemiblock old lateral wall MI and nonspecific ST-T wave abnormalities. Patient developed worsening cough and LE and scrotal edema over the past 2 weeks, causing him to be largely bedbound. Pt denied any fever, chills, n/v/c/d, dysuria, palpitations, chest pain, lightheadedness, LOC, sick contacts, recent travel. Of note, patient was reportedly previously on 100mg of torsemide intermittently in the past.   In the ED, pt was hypertensive 189/76, HR 64, tachypneic 24/min,  SpO2 94% on room air, afebrile. RVP showed Covid+. CXR done showed interstitial pulmonary edema.     Hospital Course: Patient started on diuresis with IV Bumex 2mg BID. Repeat echo with normal LV function. Cardiology consulted and recommended continuing with IV bumex. Patient also breathing comfortably without hypoxia on both room air and 2L NC  for comfort. Neprhology consulted to help establish patient baseline Cr-- state baseline likely around 1.7-1.9. Cleared for potential LHC with cardiology indicated for prior NSTEMI. Patient continued on same diuresis regimen with significant improvement in symptoms and swelling (b/l LE and scrotal). Patient removed from COVID isolation 12/19. S/p LHC with interventional cardiology 12/21 with multi-vessel disease. PCI not performed at that time as likely will be complicated procedure; will be scheduled for next week. Patient tolerated procedure well without new RADHA, chest pain, or SOB. Patient transitioned to PO diuresis with torsemide 80mg. Also planning on starting farxiga 10 after procedure. Patient will have labs done and followed by nephro team at time of PCI. Patient had repeated episodes of hypoxemia at rest and ambulation so home O2 set up by CM.     Patient medically optimized for d/c home with home O2.

## 2022-12-22 NOTE — DISCHARGE NOTE PROVIDER - CARE PROVIDERS DIRECT ADDRESSES
,DirectAddress_Unknown,[email or web address],DirectAddress_Unknown

## 2022-12-22 NOTE — PROGRESS NOTE ADULT - PROBLEM SELECTOR PLAN 1
Pt with RADHA in setting of MI and CHF. Pt gives no prior hx of kidney problems. Pt tested positive for COVID 19 on admission. Pt was admitted with significant volume overload on admission. Scr was elevated at 1.96 on 11/11/22 and  was at 2.06 on 11/17/22. SCr was elevated at 1.68 on admission (12/10/22). Now elevated to 1.9 today. UA showed proteinuria quantified at 1gm. US kidney/bladder done on 11/11/22 showed no hydronephrosis. Pt likely with CKD, exact etiology of CKD remains unclear. Tolerated PCI on 12/21/22. Hep BsAg and Hep C Ab, serum free light chain ratio and SIFE all negative for acute pathology. On Bumex 2 mg IV q12 for fluid overload can increase to 4mg BID. Can start SGLT2i inpatient. Avoid nephrotoxins. Daily weight. Dose meds as per egfr. Bladder scan PRN    If you have any questions, please feel free to contact me  Gurpreet Velasquez  Nephrology Fellow  [phone]; Prefer [employer] TEAMS  (After 5pm or on weekends please page the on-call fellow)

## 2022-12-22 NOTE — DISCHARGE NOTE PROVIDER - NSDCCPCAREPLAN_GEN_ALL_CORE_FT
PRINCIPAL DISCHARGE DIAGNOSIS  Diagnosis: Acute exacerbation of CHF (congestive heart failure)  Assessment and Plan of Treatment: You were admitted to the hospital with significant swelling and shortness of breath caused by a heart failure exacerbation. We diuresed you with a medication called bumex which removed a significant amount of fluid from your body and decreased your swelling significantly. You should continue on this diuretic as an outpatient and follow closely with your cardiologist.   You also underwent a cardiac catheterization/angiogram during this admission in the setting of your prior admission for a heart attack. Only 20% stenosis of a coronary artery was seen, and therefore, no intervention was done. You should continue to follow closely with your cardiologist.      SECONDARY DISCHARGE DIAGNOSES  Diagnosis: Dyspnea on exertion  Assessment and Plan of Treatment:     Diagnosis: COVID-19 virus infection  Assessment and Plan of Treatment: You were incidentally found to be COVID positive on infection. You were largely asymptomatic; however, this caused you to be placed on isolation for 10 days. You did not receive any treatment for covid.     PRINCIPAL DISCHARGE DIAGNOSIS  Diagnosis: Acute exacerbation of CHF (congestive heart failure)  Assessment and Plan of Treatment: You were admitted to the hospital with significant swelling and shortness of breath caused by a heart failure exacerbation. We diuresed you with a medication called bumex which removed a significant amount of fluid from your body and decreased your swelling significantly. You should continue on this diuretic as an outpatient and follow closely with your cardiologist.   You also underwent a cardiac catheterization/angiogram during this admission in the setting of your prior admission for a heart attack. You were found to have multivessel coronary artery disease that will require a repeat catheterization sometime next week (cardiology will call you to follow up on this appointment and what is required of you prior to that date).   You were also started on a new medication for your heart failure called Farxiga. The side effects of this medication include diabetic ketoacidosis despite normal glucose levels and fungal urinary tract infections. If you experience any burning or pain on urination, increased frequency of urination, altered mental status, or lightheadedness you should reach out to your primary care doctor immediately or come to the ED.      SECONDARY DISCHARGE DIAGNOSES  Diagnosis: Dyspnea on exertion  Assessment and Plan of Treatment:     Diagnosis: COVID-19 virus infection  Assessment and Plan of Treatment: You were incidentally found to be COVID positive on infection. You were largely asymptomatic; however, this caused you to be placed on isolation for 10 days. You did not receive any treatment for covid.     PRINCIPAL DISCHARGE DIAGNOSIS  Diagnosis: Acute exacerbation of CHF (congestive heart failure)  Assessment and Plan of Treatment: You were admitted to the hospital with significant swelling and shortness of breath caused by a heart failure exacerbation. We diuresed you with a medication called bumex which removed a significant amount of fluid from your body and decreased your swelling significantly. You should continue on this diuretic as an outpatient and follow closely with your cardiologist.   You also underwent a cardiac catheterization/angiogram during this admission in the setting of your prior admission for a heart attack. You were found to have multivessel coronary artery disease that will require a repeat catheterization sometime next week (cardiology will call you to follow up on this appointment and what is required of you prior to that date).   You were also prescribed a new medication for your heart failure called Farxiga. DO NOT START TAKING THIS MEDICATION UNTIL AFTER YOUR CARDIAC PROCEDURE. The side effects of this medication include diabetic ketoacidosis despite normal glucose levels and fungal urinary tract infections. If you experience any burning or pain on urination, increased frequency of urination, altered mental status, or lightheadedness you should reach out to your primary care doctor immediately or come to the ED.      SECONDARY DISCHARGE DIAGNOSES  Diagnosis: Dyspnea on exertion  Assessment and Plan of Treatment:     Diagnosis: COVID-19 virus infection  Assessment and Plan of Treatment: You were incidentally found to be COVID positive on infection. You were largely asymptomatic; however, this caused you to be placed on isolation for 10 days. You did not receive any treatment for covid.     PRINCIPAL DISCHARGE DIAGNOSIS  Diagnosis: Acute exacerbation of CHF (congestive heart failure)  Assessment and Plan of Treatment: You were admitted to the hospital with significant swelling and shortness of breath caused by a heart failure exacerbation. We removed fluid by "diuresis" - giving you medication to make you pee. We used an IV medication called bumex which removed a significant amount of fluid from your body and decreased your swelling significantly.We are switching you to a similar medication called Torsemide which you should take as an outpatient and follow closely with your cardiologist.   You also underwent a cardiac catheterization/angiogram during this admission in the setting of your prior admission for a heart attack. You were found to have blockages in multiple arteries that supply blood to your heart that will require a repeat procedure sometime next week to try to open up these arteries.(cardiology will call you to follow up on this appointment and what is required of you prior to that date). PLEASE DO NOT TAKE THE DOSE OF TORSEMIDE PRIOR TO YOUR CARDIAC PROCEDURE.  You were also prescribed a new medication for your heart failure called Farxiga. DO NOT START TAKING THIS MEDICATION UNTIL AFTER YOUR CARDIAC PROCEDURE. The serious side effects of this medication include euglycemic diabetic ketoacidosis (increased acidity level in the blood) and fungal urinary tract infections. If you're feeling unwell and are not eating/drinking much you should hold the farxiga until you're feeling better. Symptoms of euglycemic DKA include nausea, vomiting, abdominal pain, severe weakness. Symptoms of urinary infection include burning or pain on urination, increased frequency of urination, lower abdominal pain, altered mental status, or lightheadedness. If you experience these symptoms please come to the hospital.      SECONDARY DISCHARGE DIAGNOSES  Diagnosis: Dyspnea on exertion  Assessment and Plan of Treatment:     Diagnosis: COVID-19 virus infection  Assessment and Plan of Treatment: You were incidentally found to be COVID positive on infection. You were largely asymptomatic; however, this caused you to be placed on isolation for 10 days. You did not receive any treatment for covid.

## 2022-12-22 NOTE — PROGRESS NOTE ADULT - SUBJECTIVE AND OBJECTIVE BOX
[facility] DIVISION OF KIDNEY DISEASES AND HYPERTENSION -- FOLLOW UP NOTE  --------------------------------------------------------------------------------  HPI: 62 year old female with PMH of HTN, HLD, DM, CHF admitted with worsening SOB and LE swelling. Pt noted to have worsening of SCr. Nephrology consulted for RADHA. Pt seen and examined. Pt with of hx of CHF. Upon review of labs, Scr was elevated at 1.96 on 11/11/22/. No prior labs available to review. Pt says he has not seen any nephrologist in the past. Pt was admitted in November 2022 with similar complaints. Scr was elevated at 2.06 on 11/17/22.  UA done on 12/9/22 showed proteinuria. Pt currently receiving Bumex for fluid overload. Pt complaining of persistent LE swelling. Denies fever, chills, nausea, vomiting, abdominal pain, HA during rounds. Pt is nonoliguric.    Pt. seen today, endorsers improvement with breathing. He had [facility] yesterday. Good UOP on Bumex BID. Remains on NC.      PAST HISTORY  --------------------------------------------------------------------------------  No significant changes to PMH, PSH, FHx, SHx, unless otherwise noted    ALLERGIES & MEDICATIONS  --------------------------------------------------------------------------------  Allergies    No Known Allergies    Intolerances      Standing Inpatient Medications  aspirin enteric coated 81 milliGRAM(s) Oral daily  atorvastatin 80 milliGRAM(s) Oral at bedtime  buMETAnide Injectable 2 milliGRAM(s) IV Push every 12 hours  carvedilol 25 milliGRAM(s) Oral every 12 hours  chlorhexidine 4% Liquid 1 Application(s) Topical daily  clopidogrel Tablet 75 milliGRAM(s) Oral daily  clotrimazole/betamethasone Cream 1 Application(s) Topical two times a day  dextrose 5%. 1000 milliLiter(s) IV Continuous <Continuous>  dextrose 5%. 1000 milliLiter(s) IV Continuous <Continuous>  dextrose 50% Injectable 25 Gram(s) IV Push once  dextrose 50% Injectable 12.5 Gram(s) IV Push once  dextrose 50% Injectable 25 Gram(s) IV Push once  gabapentin 100 milliGRAM(s) Oral daily  glucagon  Injectable 1 milliGRAM(s) IntraMuscular once  heparin   Injectable 5000 Unit(s) SubCutaneous every 8 hours  hydrALAZINE 100 milliGRAM(s) Oral every 8 hours  insulin glargine Injectable (LANTUS) 40 Unit(s) SubCutaneous at bedtime  insulin lispro (ADMELOG) corrective regimen sliding scale   SubCutaneous at bedtime  insulin lispro (ADMELOG) corrective regimen sliding scale   SubCutaneous three times a day before meals  insulin lispro Injectable (ADMELOG) 10 Unit(s) SubCutaneous three times a day before meals  NIFEdipine XL 60 milliGRAM(s) Oral every 24 hours  spironolactone 50 milliGRAM(s) Oral daily    PRN Inpatient Medications  acetaminophen     Tablet .. 650 milliGRAM(s) Oral every 6 hours PRN  albuterol/ipratropium for Nebulization 3 milliLiter(s) Nebulizer every 6 hours PRN  aluminum hydroxide/magnesium hydroxide/simethicone Suspension 30 milliLiter(s) Oral every 4 hours PRN  bisacodyl 5 milliGRAM(s) Oral every 12 hours PRN  dextrose Oral Gel 15 Gram(s) Oral once PRN  melatonin 3 milliGRAM(s) Oral at bedtime PRN  ondansetron Injectable 4 milliGRAM(s) IV Push every 8 hours PRN  polyethylene glycol 3350 17 Gram(s) Oral two times a day PRN  senna 2 Tablet(s) Oral at bedtime PRN  sodium chloride 0.65% Nasal 1 Spray(s) Both Nostrils every 6 hours PRN      REVIEW OF SYSTEMS  --------------------------------------------------------------------------------  As per HPI    VITALS/PHYSICAL EXAM  --------------------------------------------------------------------------------  T(C): 36.9 (12-22-22 @ 05:15), Max: 37.1 (12-21-22 @ 20:59)  HR: 68 (12-22-22 @ 05:15) (58 - 68)  BP: 145/73 (12-22-22 @ 05:15) (145/73 - 167/73)  RR: 18 (12-22-22 @ 05:15) (18 - 20)  SpO2: 96% (12-22-22 @ 05:15) (92% - 98%)  Wt(kg): --        12-21-22 @ 07:01  -  12-22-22 @ 07:00  --------------------------------------------------------  IN: 600 mL / OUT: 2900 mL / NET: -2300 mL        Physical Exam:  	Gen: NAD  	HEENT: MMM  	Pulm: CTA B/L  	CV: S1S2  	Abd: Soft, +BS   	Ext: No LE edema B/L  	Neuro: Awake  	Skin: Warm and dry  	Vascular access:      LABS/STUDIES  --------------------------------------------------------------------------------              9.0    8.30  >-----------<  211      [12-22-22 @ 06:53]              29.2     137  |  100  |  58  ----------------------------<  206      [12-22-22 @ 06:53]  5.0   |  25  |  1.91        Ca     9.2     [12-22-22 @ 06:53]      Mg     2.3     [12-22-22 @ 06:53]      Phos  4.5     [12-22-22 @ 06:53]    TPro  6.9  /  Alb  3.9  /  TBili  0.7  /  DBili  x   /  AST  21  /  ALT  27  /  AlkPhos  97  [12-22-22 @ 06:53]    PT/INR: PT 14.0 , INR 1.20       [12-22-22 @ 06:53]  PTT: 30.9       [12-22-22 @ 06:53]      Creatinine Trend:  SCr 1.91 [12-22 @ 06:53]  SCr 2.11 [12-21 @ 09:07]  SCr 1.89 [12-20 @ 07:13]  SCr 2.02 [12-19 @ 09:35]  SCr 1.75 [12-17 @ 11:24]    Urinalysis - [12-19-22 @ 13:55]      Color Light Yellow / Appearance Clear / SG 1.012 / pH 5.5      Gluc Negative / Ketone Negative  / Bili Negative / Urobili Negative       Blood Negative / Protein 30 mg/dL / Leuk Est Negative / Nitrite Negative      RBC 1 / WBC 0 / Hyaline 1 / Gran  / Sq Epi  / Non Sq Epi 0 / Bacteria Negative    Urine Creatinine 55      [12-19-22 @ 13:54]  Urine Protein 58      [12-19-22 @ 13:54]  Urine Sodium 67      [12-19-22 @ 13:54]  Urine Urea Nitrogen 562      [12-19-22 @ 13:54]  Urine Potassium 27      [12-19-22 @ 13:54]  Urine Osmolality 389      [12-19-22 @ 13:55]    Lipid: chol 67, TG 63, HDL 26, LDL --      [12-10-22 @ 06:55]    HBsAb Nonreact      [12-17-22 @ 11:24]  HBsAg Nonreact      [12-16-22 @ 11:38]  HCV 0.18, Nonreact      [12-10-22 @ 06:55]    Free Light Chains: kappa 10.41, lambda 4.40, ratio = 2.37      [12-16 @ 11:38]

## 2022-12-22 NOTE — DISCHARGE NOTE PROVIDER - CARE PROVIDER_API CALL
ALEXANDRIA BONDS  Internal Medicine  [address] [place] [address]  Phone: [phone]  Fax: [phone]  Established Patient  Follow Up Time:     Gurpreet Ocampo)  Medicine  Cardiology  [address] [place] [address]  Phone: [phone]  Fax: [phone]  Established Patient  Follow Up Time:     Shaik GERMÁN Thomson)  Cardiology; Nuclear Medicine  [address] [place] [address]  Phone: [phone]  Fax: [phone]  Established Patient  Follow Up Time:

## 2022-12-22 NOTE — DISCHARGE NOTE PROVIDER - PROVIDER TOKENS
no pedal edema/no cyanosis/no clubbing PROVIDER:[TOKEN:[[id number]],ESTABLISHEDPATIENT:[T]],PROVIDER:[[id number]],ESTABLISHEDPATIENT:[T]],PROVIDER:[TOKEN:[[id number]],ESTABLISHEDPATIENT:[T]] no cyanosis/no pedal edema/no clubbing

## 2022-12-22 NOTE — PROGRESS NOTE ADULT - ATTENDING COMMENTS
62M former smoker w/ HFpEF (TTE 11/14/22 EF 57%), h/o NSTEMI, HTN, HLD, T2DM on insulin, p/w worsening SOB, leg/scrotal edema, c/f ADHF, found covid-19 positive.    #Acute on chronic systolic HF: (recent echo with EF 40-45%)   recent hosp ~ 1 month ago. not discharged home on diuretics due to renal dysfunction.   transition to oral bumex today and monitor StrictI+Os. daily weights. keep neg.   s/p cardiac cath yesterday without intervention.   #COVID+: clinically with some mild cough. no significant dyspnea    cont have episode of hypoxia intermittently. will likely need O2.   Will monitor off treatment.   #RADHA on CKD3: Cr plateaued likely at baseline now. will monitor while cont with diuresis.   strict I+Os, weight, Cr.  #DM2: improved BS/ pt with adequate PO intake. titrate basal insulin.    d/c planning home

## 2022-12-22 NOTE — PROGRESS NOTE ADULT - SUBJECTIVE AND OBJECTIVE BOX
Cardiology Progress Note  ------------------------------------------------------------------------------------------  SUBJECTIVE:   - Received C yesterday that showed MVD, final report pending  - No events overnight. Denies CP, SOB or Palpitations.   -------------------------------------------------------------------------------------------  ROS:  CV: chest pain (-), palpitation (-), orthopnea (-), PND (-), edema (-)  PULM: SOB (-), cough (-), wheezing (-), hemoptysis (-).   CONST: fever (-), chills (-) or fatigue (-)  GI: abdominal distension (-), abdominal pain (-) , nausea/vomiting (-), hematemesis, (-), melena (-), hematochezia (-)  : dysuria (-), frequency (-), hematuria (-).   NEURO: numbness (-), weakness (-), dizziness (-)  MSK: myalgia (-), joint pain (-)   SKIN: itching (-), rash (-)  HEENT:  visual changes (-); vertigo or throat pain (-);  neck stiffness (-)   Psych: change in mood (-), anxiety (-), depression (-)     All other review of systems is negative unless indicated above.   -------------------------------------------------------------------------------------------  VS:  T(F): 98.4 (12-22), Max: 98.8 (12-21)  HR: 68 (12-22) (58 - 68)  BP: 145/73 (12-22) (145/73 - 167/73)  RR: 18 (12-22)  SpO2: 96% (12-22)  I&O's Summary    21 Dec 2022 07:01  -  22 Dec 2022 07:00  --------------------------------------------------------  IN: 600 mL / OUT: 2900 mL / NET: -2300 mL      PHYSICAL EXAM:  GENERAL: NAD  HEAD:  Atraumatic, Normocephalic.  EYES: EOMI, PERRLA, conjunctiva and sclera clear.  ENT: Moist mucous membranes.  NECK: Supple, +JVD up to mandible at 30 degrees.  CHEST/LUNG: Crackles at bases b/l.  HEART: Regular rate and rhythm; No murmurs, rubs, or gallops.  ABDOMEN: Bowel sounds present; Soft, Nontender, Nondistended.   EXTREMITIES: R radial access site c/d/i, nontender, no hematoma, 2+ radial pulse, hand neuromuscularly intact. 1+ edema up to mid shins b/l, improved compared to yesterday. 2+ Peripheral Pulses, brisk capillary refill. No clubbing or cyanosis.   PSYCH: Normal affect.  SKIN: No rashes or lesions.  -------------------------------------------------------------------------------------------  LABS:                          9.0    8.30  )-----------( 211      ( 22 Dec 2022 06:53 )             29.2     12-22    137  |  100  |  58<H>  ----------------------------<  206<H>  5.0   |  25  |  1.91<H>    Ca    9.2      22 Dec 2022 06:53  Phos  4.5     12-22  Mg     2.3     12-22    TPro  6.9  /  Alb  3.9  /  TBili  0.7  /  DBili  x   /  AST  21  /  ALT  27  /  AlkPhos  97  12-22    PT/INR - ( 22 Dec 2022 06:53 )   PT: 14.0 sec;   INR: 1.20 ratio         PTT - ( 22 Dec 2022 06:53 )  PTT:30.9 sec            -------------------------------------------------------------------------------------------  Meds:  acetaminophen     Tablet .. 650 milliGRAM(s) Oral every 6 hours PRN  albuterol/ipratropium for Nebulization 3 milliLiter(s) Nebulizer every 6 hours PRN  aluminum hydroxide/magnesium hydroxide/simethicone Suspension 30 milliLiter(s) Oral every 4 hours PRN  aspirin enteric coated 81 milliGRAM(s) Oral daily  atorvastatin 80 milliGRAM(s) Oral at bedtime  bisacodyl 5 milliGRAM(s) Oral every 12 hours PRN  buMETAnide Injectable 2 milliGRAM(s) IV Push every 12 hours  carvedilol 25 milliGRAM(s) Oral every 12 hours  chlorhexidine 4% Liquid 1 Application(s) Topical daily  clopidogrel Tablet 75 milliGRAM(s) Oral daily  clotrimazole/betamethasone Cream 1 Application(s) Topical two times a day  dextrose 5%. 1000 milliLiter(s) IV Continuous <Continuous>  dextrose 5%. 1000 milliLiter(s) IV Continuous <Continuous>  dextrose 50% Injectable 25 Gram(s) IV Push once  dextrose 50% Injectable 12.5 Gram(s) IV Push once  dextrose 50% Injectable 25 Gram(s) IV Push once  dextrose Oral Gel 15 Gram(s) Oral once PRN  gabapentin 100 milliGRAM(s) Oral daily  glucagon  Injectable 1 milliGRAM(s) IntraMuscular once  heparin   Injectable 5000 Unit(s) SubCutaneous every 8 hours  hydrALAZINE 100 milliGRAM(s) Oral every 8 hours  insulin glargine Injectable (LANTUS) 40 Unit(s) SubCutaneous at bedtime  insulin lispro (ADMELOG) corrective regimen sliding scale   SubCutaneous at bedtime  insulin lispro (ADMELOG) corrective regimen sliding scale   SubCutaneous three times a day before meals  insulin lispro Injectable (ADMELOG) 10 Unit(s) SubCutaneous three times a day before meals  melatonin 3 milliGRAM(s) Oral at bedtime PRN  NIFEdipine XL 60 milliGRAM(s) Oral every 24 hours  ondansetron Injectable 4 milliGRAM(s) IV Push every 8 hours PRN  polyethylene glycol 3350 17 Gram(s) Oral two times a day PRN  senna 2 Tablet(s) Oral at bedtime PRN  sodium chloride 0.65% Nasal 1 Spray(s) Both Nostrils every 6 hours PRN  spironolactone 50 milliGRAM(s) Oral daily    -------------------------------------------------------------------------------------------  Cardiovascular Diagnostic Testing:    Echo:   12/13/22:  Conclusions:  Endocardial visualization enhanced with intravenous  injection of Ultrasonic Enhancing Agent (Definity).  Normal left ventricular systolic function. No segmental  wall motion abnormalities.  Pulmonary hypertension. Estimated PASP 46 mmHg + estimated  right atrial pressure.    Cath:  12/21/22: MVD, final report pending    CXR:  reviewed    -------------------------------------------------------------------------------------------

## 2022-12-22 NOTE — DISCHARGE NOTE PROVIDER - NSDCMRMEDTOKEN_GEN_ALL_CORE_FT
amLODIPine 10 mg oral tablet: 1 tab(s) orally once a day  amLODIPine 10 mg oral tablet: 1 tab(s) orally once a day    aspirin 81 mg oral delayed release tablet: 1 tab(s) orally once a day    Aspirin Enteric Coated 81 mg oral delayed release tablet: 1 tab(s) orally once a day  atorvastatin 80 mg oral tablet: 1 tab(s) orally once a day  atorvastatin 80 mg oral tablet: 1 tab(s) orally once a day (at bedtime)  Basaglar KwikPen 100 units/mL subcutaneous solution: 47 unit(s) subcutaneous once a day (at bedtime)  carvedilol 25 mg oral tablet: 1 tab(s) orally 2 times a day  carvedilol 25 mg oral tablet: 1 tab(s) orally every 12 hours  clopidogrel 75 mg oral tablet: 1 tab(s) orally once a day  clopidogrel 75 mg oral tablet: 1 tab(s) orally once a day  Colace 100 mg oral capsule: 1 cap(s) orally 2 times a day    Colace 100 mg oral capsule: 1 cap(s) orally 2 times a day  HumaLOG 100 units/mL subcutaneous solution: 10 unit(s) subcutaneous 3 times a day    HumaLOG KwikPen 100 units/mL injectable solution: 10 unit(s) injectable 3 times a day (before meals)  hydrALAZINE 100 mg oral tablet: 1 tab(s) orally every 8 hours  hydrALAZINE 100 mg oral tablet: 1 tab(s) orally 3 times a day  insulin glargine 100 units/mL subcutaneous solution: 47 unit(s) subcutaneous once a day (at bedtime)    levoFLOXacin 500 mg oral tablet: 1 tab(s) orally every 24 hours  mupirocin 2% nasal ointment: 1 application nasal 2 times a day  mupirocin 2% nasal ointment: 1 application in each nostril 2 times a day  x 4 more days  Senna 8.6 mg oral tablet: 1 tab(s) orally once a day (at bedtime)    Senna 8.6 mg oral tablet: 1 tab(s) orally once a day (at bedtime)  torsemide 100 mg oral tablet: 1 tab(s) orally once a day  torsemide 20 mg oral tablet: 1 tab(s) orally 2 times a day   amLODIPine 10 mg oral tablet: 1 tab(s) orally once a day  amLODIPine 10 mg oral tablet: 1 tab(s) orally once a day    aspirin 81 mg oral delayed release tablet: 1 tab(s) orally once a day    Aspirin Enteric Coated 81 mg oral delayed release tablet: 1 tab(s) orally once a day  atorvastatin 80 mg oral tablet: 1 tab(s) orally once a day  atorvastatin 80 mg oral tablet: 1 tab(s) orally once a day (at bedtime)  Basaglar KwikPen 100 units/mL subcutaneous solution: 47 unit(s) subcutaneous once a day (at bedtime)  carvedilol 25 mg oral tablet: 1 tab(s) orally 2 times a day  carvedilol 25 mg oral tablet: 1 tab(s) orally every 12 hours  clopidogrel 75 mg oral tablet: 1 tab(s) orally once a day  clopidogrel 75 mg oral tablet: 1 tab(s) orally once a day  Colace 100 mg oral capsule: 1 cap(s) orally 2 times a day    Colace 100 mg oral capsule: 1 cap(s) orally 2 times a day  Farxiga 10 mg oral tablet: test script for price check.     please dont fill until you speak with primary team ([facility] Candida Castro or Kleber Dodge on TEAMS or [phone])   HumaLOG 100 units/mL subcutaneous solution: 10 unit(s) subcutaneous 3 times a day    HumaLOG KwikPen 100 units/mL injectable solution: 10 unit(s) injectable 3 times a day (before meals)  hydrALAZINE 100 mg oral tablet: 1 tab(s) orally every 8 hours  hydrALAZINE 100 mg oral tablet: 1 tab(s) orally 3 times a day  insulin glargine 100 units/mL subcutaneous solution: 47 unit(s) subcutaneous once a day (at bedtime)    levoFLOXacin 500 mg oral tablet: 1 tab(s) orally every 24 hours  mupirocin 2% nasal ointment: 1 application nasal 2 times a day  mupirocin 2% nasal ointment: 1 application in each nostril 2 times a day  x 4 more days  Senna 8.6 mg oral tablet: 1 tab(s) orally once a day (at bedtime)    Senna 8.6 mg oral tablet: 1 tab(s) orally once a day (at bedtime)  torsemide 100 mg oral tablet: 1 tab(s) orally once a day  torsemide 20 mg oral tablet: 1 tab(s) orally 2 times a day   aspirin 81 mg oral delayed release tablet: 1 tab(s) orally once a day    Aspirin Enteric Coated 81 mg oral delayed release tablet: 1 tab(s) orally once a day  atorvastatin 80 mg oral tablet: 1 tab(s) orally once a day (at bedtime)  Basaglar KwikPen 100 units/mL subcutaneous solution: 47 unit(s) subcutaneous once a day (at bedtime)  Basic metabolic panel with Magnesium and Phosphorus : Basic metabolic panel with Magnesium and Phosphorus     please fax results to [facility]  [phone]  carvedilol 25 mg oral tablet: 1 tab(s) orally every 12 hours    meds to beds  clopidogrel 75 mg oral tablet: 1 tab(s) orally once a day    meds to beds  Colace 100 mg oral capsule: 1 cap(s) orally 2 times a day  Farxiga 10 mg oral tablet: Meds to beds    Take one tablet daily   gabapentin 100 mg oral capsule: 1 cap(s) orally once a day    meds to beds  HumaLOG 100 units/mL subcutaneous solution: 10 unit(s) subcutaneous 3 times a day    hydrALAZINE 100 mg oral tablet: 1 tab(s) orally every 8 hours  insulin glargine 100 units/mL subcutaneous solution: 47 unit(s) subcutaneous once a day (at bedtime)    NIFEdipine 60 mg oral tablet, extended release: 1 tab(s) orally every 24 hours    meds to beds  Senna 8.6 mg oral tablet: 1 tab(s) orally once a day (at bedtime)    spironolactone 25 mg oral tablet: 2 tab(s) orally once a day    meds to beds  torsemide 40 mg oral tablet: 2 tab(s) orally (80mg)  once a day     meds to beds    aspirin 81 mg oral delayed release tablet: 1 tab(s) orally once a day    Aspirin Enteric Coated 81 mg oral delayed release tablet: 1 tab(s) orally once a day  atorvastatin 80 mg oral tablet: 1 tab(s) orally once a day (at bedtime)  Basaglar KwikPen 100 units/mL subcutaneous solution: 47 unit(s) subcutaneous once a day (at bedtime)  Basic metabolic panel with Magnesium and Phosphorus : Basic metabolic panel with Magnesium and Phosphorus     please fax results to [facility]  [phone]  carvedilol 25 mg oral tablet: 1 tab(s) orally every 12 hours    meds to beds  clopidogrel 75 mg oral tablet: 1 tab(s) orally once a day    meds to beds  Colace 100 mg oral capsule: 1 cap(s) orally 2 times a day  Farxiga 10 mg oral tablet: Meds to beds    Take one tablet daily   gabapentin 100 mg oral capsule: 1 cap(s) orally once a day    meds to beds  HumaLOG 100 units/mL subcutaneous solution: 10 unit(s) subcutaneous 3 times a day    hydrALAZINE 100 mg oral tablet: 1 tab(s) orally every 8 hours  insulin glargine 100 units/mL subcutaneous solution: 47 unit(s) subcutaneous once a day (at bedtime)    NIFEdipine 60 mg oral tablet, extended release: 1 tab(s) orally every 24 hours    meds to beds  Senna 8.6 mg oral tablet: 1 tab(s) orally once a day (at bedtime)    spironolactone 25 mg oral tablet: 2 tab(s) orally once a day    meds to beds  torsemide 20 mg oral tablet: 4 tab(s) orally once a day (80mg)  torsemide 40 mg oral tablet: 2 tab(s) orally (80mg)  once a day     meds to beds    amLODIPine 10 mg oral tablet: 1 tab(s) orally once a day  Aspirin Enteric Coated 81 mg oral delayed release tablet: 1 tab(s) orally once a day  atorvastatin 80 mg oral tablet: 1 tab(s) orally once a day (at bedtime)  Basaglar KwikPen 100 units/mL subcutaneous solution: 47 unit(s) subcutaneous once a day (at bedtime)  carvedilol 25 mg oral tablet: 1 tab(s) orally every 12 hours    meds to beds  clopidogrel 75 mg oral tablet: 1 tab(s) orally once a day    meds to beds  Colace 100 mg oral capsule: 1 cap(s) orally 2 times a day  Farxiga 10 mg oral tablet: Meds to beds    Take one tablet daily   gabapentin 100 mg oral capsule: 1 cap(s) orally once a day    meds to beds  HumaLOG 100 units/mL subcutaneous solution: 10 unit(s) subcutaneous 3 times a day    hydrALAZINE 100 mg oral tablet: 1 tab(s) orally every 8 hours  Senna 8.6 mg oral tablet: 1 tab(s) orally once a day (at bedtime)    spironolactone 25 mg oral tablet: 2 tab(s) orally once a day    meds to beds  torsemide 20 mg oral tablet: 4 tab(s) orally once a day (80mg)   amLODIPine 10 mg oral tablet: 1 tab(s) orally once a day  Aspirin Enteric Coated 81 mg oral delayed release tablet: 1 tab(s) orally once a day  atorvastatin 80 mg oral tablet: 1 tab(s) orally once a day (at bedtime)  Basaglar KwikPen 100 units/mL subcutaneous solution: 47 unit(s) subcutaneous once a day (at bedtime)  carvedilol 25 mg oral tablet: 1 tab(s) orally every 12 hours    meds to beds  clopidogrel 75 mg oral tablet: 1 tab(s) orally once a day    meds to beds  Farxiga 10 mg oral tablet: Meds to beds    Take one tablet daily   gabapentin 100 mg oral capsule: 1 cap(s) orally once a day    meds to beds  hydrALAZINE 100 mg oral tablet: 1 tab(s) orally every 8 hours  spironolactone 25 mg oral tablet: 2 tab(s) orally once a day    meds to beds  torsemide 20 mg oral tablet: 4 tab(s) orally once a day (80mg)

## 2022-12-22 NOTE — PROGRESS NOTE ADULT - ASSESSMENT
62M with obesity, HFpEF, CKD, DM, HTN, recent NSTEMI medically treated, presenting with b/l LE and abdominal swelling as well as CORCORAN in setting of not being on diuretics. Has been treated with IV diuresis, volume status improving. Also COVID+, completed isolation. S/p LHC on 12/21/22 that showed MVD, awaiting continued joint decision making with pt and providers.    1. Acute HFpEF:   - likely due to non-compliance with diuretics.   - EF is normal, no segmental LV dysfunction  - transition to oral bumex 2mg BID today  - I/O's, daily weights  - replete K4Mg2  - continue coreg 25 q12  - continue spironolactone 50 daily  - Monitor creatinine closely. Given renal function, will hold off on SGLT2i at this time.     2. HLD:  - continue atorvastatin 80    3. Recent NSTEMI: treated medically last admission given elevated Cr with plan for outpt LHC once Cr improved  - c/w home aspirin 81, plavix 75  - c/w home atorvastatin 80    4. HTN:  - continue coreg, aldactone as above  - continue hydralazine 100q8  - continue nifedipine 60 daily    5. Elevated Cr: on admission 1.80, has remained stable with diuresis  - nephrology consulted, appreciate assistance in care    Andrew Juan MD  Cardiology Fellow - PGY4    Please check [email or web address]: [email or web address] for cardiology service schedule and contact information.      62M with obesity, HFpEF, CKD, DM, HTN, recent NSTEMI medically treated, presenting with b/l LE and abdominal swelling as well as CORCORAN in setting of not being on diuretics. Has been treated with IV diuresis, volume status improving. Also COVID+, completed isolation. S/p LHC on 12/21/22 that showed MVD, after joint discussion with pt and interventionalist, pt will follow-up outpt to have repeat LHC/PCI.    1. Acute HFpEF:   - likely due to non-compliance with diuretics.   - EF is normal, no segmental LV dysfunction  - nearing euvolemia, defer diuretic management to nephrology  - I/O's, daily weights  - replete K4Mg2  - continue coreg 25 q12  - continue spironolactone 50 daily  - Monitor creatinine closely. Given renal function, will hold off on SGLT2i at this time.     2. HLD:  - continue atorvastatin 80    3. Recent NSTEMI: treated medically last admission given elevated Cr with plan for outpt LHC once Cr improved  - c/w home aspirin 81, plavix 75  - c/w home atorvastatin 80    4. HTN:  - continue coreg, aldactone as above  - continue hydralazine 100q8  - continue nifedipine 60 daily    5. Elevated Cr: on admission 1.80, has remained stable with diuresis  - nephrology consulted, appreciate assistance in care    Cardiology will sign off, please re-consult with any questions.    Andrew Juan MD  Cardiology Fellow - PGY4    Please check [email or web address]: [email or web address] for cardiology service schedule and contact information.

## 2022-12-23 ENCOUNTER — TRANSCRIPTION ENCOUNTER (OUTPATIENT)
Age: 62
End: 2022-12-23

## 2022-12-23 VITALS
SYSTOLIC BLOOD PRESSURE: 144 MMHG | OXYGEN SATURATION: 94 % | DIASTOLIC BLOOD PRESSURE: 55 MMHG | TEMPERATURE: 99 F | HEART RATE: 62 BPM | WEIGHT: 256.84 LBS

## 2022-12-23 LAB
ANION GAP SERPL CALC-SCNC: 12 MMOL/L — SIGNIFICANT CHANGE UP (ref 5–17)
BUN SERPL-MCNC: 63 MG/DL — HIGH (ref 7–23)
CALCIUM SERPL-MCNC: 9.4 MG/DL — SIGNIFICANT CHANGE UP (ref 8.4–10.5)
CHLORIDE SERPL-SCNC: 101 MMOL/L — SIGNIFICANT CHANGE UP (ref 96–108)
CO2 SERPL-SCNC: 26 MMOL/L — SIGNIFICANT CHANGE UP (ref 22–31)
CREAT SERPL-MCNC: 2.14 MG/DL — HIGH (ref 0.5–1.3)
EGFR: 34 ML/MIN/1.73M2 — LOW
GLUCOSE BLDC GLUCOMTR-MCNC: 150 MG/DL — HIGH (ref 70–99)
GLUCOSE BLDC GLUCOMTR-MCNC: 175 MG/DL — HIGH (ref 70–99)
GLUCOSE BLDC GLUCOMTR-MCNC: 177 MG/DL — HIGH (ref 70–99)
GLUCOSE SERPL-MCNC: 155 MG/DL — HIGH (ref 70–99)
HCT VFR BLD CALC: 29.6 % — LOW (ref 39–50)
HGB BLD-MCNC: 9.2 G/DL — LOW (ref 13–17)
MAGNESIUM SERPL-MCNC: 2.4 MG/DL — SIGNIFICANT CHANGE UP (ref 1.6–2.6)
MCHC RBC-ENTMCNC: 29.4 PG — SIGNIFICANT CHANGE UP (ref 27–34)
MCHC RBC-ENTMCNC: 31.1 GM/DL — LOW (ref 32–36)
MCV RBC AUTO: 94.6 FL — SIGNIFICANT CHANGE UP (ref 80–100)
MRSA PCR RESULT.: DETECTED
NRBC # BLD: 0 /100 WBCS — SIGNIFICANT CHANGE UP (ref 0–0)
PHOSPHATE SERPL-MCNC: 5 MG/DL — HIGH (ref 2.5–4.5)
PLATELET # BLD AUTO: 208 K/UL — SIGNIFICANT CHANGE UP (ref 150–400)
POTASSIUM SERPL-MCNC: 5.2 MMOL/L — SIGNIFICANT CHANGE UP (ref 3.5–5.3)
POTASSIUM SERPL-SCNC: 5.2 MMOL/L — SIGNIFICANT CHANGE UP (ref 3.5–5.3)
RBC # BLD: 3.13 M/UL — LOW (ref 4.2–5.8)
RBC # FLD: 14.8 % — HIGH (ref 10.3–14.5)
S AUREUS DNA NOSE QL NAA+PROBE: DETECTED
SODIUM SERPL-SCNC: 139 MMOL/L — SIGNIFICANT CHANGE UP (ref 135–145)
WBC # BLD: 7.95 K/UL — SIGNIFICANT CHANGE UP (ref 3.8–10.5)
WBC # FLD AUTO: 7.95 K/UL — SIGNIFICANT CHANGE UP (ref 3.8–10.5)

## 2022-12-23 PROCEDURE — 83735 ASSAY OF MAGNESIUM: CPT

## 2022-12-23 PROCEDURE — 80048 BASIC METABOLIC PNL TOTAL CA: CPT

## 2022-12-23 PROCEDURE — 93458 L HRT ARTERY/VENTRICLE ANGIO: CPT

## 2022-12-23 PROCEDURE — 87641 MR-STAPH DNA AMP PROBE: CPT

## 2022-12-23 PROCEDURE — 86334 IMMUNOFIX E-PHORESIS SERUM: CPT

## 2022-12-23 PROCEDURE — 87640 STAPH A DNA AMP PROBE: CPT

## 2022-12-23 PROCEDURE — 86706 HEP B SURFACE ANTIBODY: CPT

## 2022-12-23 PROCEDURE — C1887: CPT

## 2022-12-23 PROCEDURE — 85027 COMPLETE CBC AUTOMATED: CPT

## 2022-12-23 PROCEDURE — 97162 PT EVAL MOD COMPLEX 30 MIN: CPT

## 2022-12-23 PROCEDURE — 82803 BLOOD GASES ANY COMBINATION: CPT

## 2022-12-23 PROCEDURE — 71045 X-RAY EXAM CHEST 1 VIEW: CPT

## 2022-12-23 PROCEDURE — 83880 ASSAY OF NATRIURETIC PEPTIDE: CPT

## 2022-12-23 PROCEDURE — C1894: CPT

## 2022-12-23 PROCEDURE — 93005 ELECTROCARDIOGRAM TRACING: CPT

## 2022-12-23 PROCEDURE — 87340 HEPATITIS B SURFACE AG IA: CPT

## 2022-12-23 PROCEDURE — C1769: CPT

## 2022-12-23 PROCEDURE — 82962 GLUCOSE BLOOD TEST: CPT

## 2022-12-23 PROCEDURE — 97116 GAIT TRAINING THERAPY: CPT

## 2022-12-23 PROCEDURE — 82435 ASSAY OF BLOOD CHLORIDE: CPT

## 2022-12-23 PROCEDURE — 80053 COMPREHEN METABOLIC PANEL: CPT

## 2022-12-23 PROCEDURE — 82570 ASSAY OF URINE CREATININE: CPT

## 2022-12-23 PROCEDURE — 82330 ASSAY OF CALCIUM: CPT

## 2022-12-23 PROCEDURE — 86803 HEPATITIS C AB TEST: CPT

## 2022-12-23 PROCEDURE — 81001 URINALYSIS AUTO W/SCOPE: CPT

## 2022-12-23 PROCEDURE — 99239 HOSP IP/OBS DSCHRG MGMT >30: CPT | Mod: GC

## 2022-12-23 PROCEDURE — 99285 EMERGENCY DEPT VISIT HI MDM: CPT

## 2022-12-23 PROCEDURE — 83036 HEMOGLOBIN GLYCOSYLATED A1C: CPT

## 2022-12-23 PROCEDURE — 82553 CREATINE MB FRACTION: CPT

## 2022-12-23 PROCEDURE — 82947 ASSAY GLUCOSE BLOOD QUANT: CPT

## 2022-12-23 PROCEDURE — 87637 SARSCOV2&INF A&B&RSV AMP PRB: CPT

## 2022-12-23 PROCEDURE — 84484 ASSAY OF TROPONIN QUANT: CPT

## 2022-12-23 PROCEDURE — 85014 HEMATOCRIT: CPT

## 2022-12-23 PROCEDURE — 84133 ASSAY OF URINE POTASSIUM: CPT

## 2022-12-23 PROCEDURE — 82550 ASSAY OF CK (CPK): CPT

## 2022-12-23 PROCEDURE — 93970 EXTREMITY STUDY: CPT

## 2022-12-23 PROCEDURE — 85018 HEMOGLOBIN: CPT

## 2022-12-23 PROCEDURE — 84295 ASSAY OF SERUM SODIUM: CPT

## 2022-12-23 PROCEDURE — 85730 THROMBOPLASTIN TIME PARTIAL: CPT

## 2022-12-23 PROCEDURE — 83605 ASSAY OF LACTIC ACID: CPT

## 2022-12-23 PROCEDURE — 85610 PROTHROMBIN TIME: CPT

## 2022-12-23 PROCEDURE — 84540 ASSAY OF URINE/UREA-N: CPT

## 2022-12-23 PROCEDURE — 85025 COMPLETE CBC W/AUTO DIFF WBC: CPT

## 2022-12-23 PROCEDURE — 84300 ASSAY OF URINE SODIUM: CPT

## 2022-12-23 PROCEDURE — 80061 LIPID PANEL: CPT

## 2022-12-23 PROCEDURE — 84100 ASSAY OF PHOSPHORUS: CPT

## 2022-12-23 PROCEDURE — 97110 THERAPEUTIC EXERCISES: CPT

## 2022-12-23 PROCEDURE — 84156 ASSAY OF PROTEIN URINE: CPT

## 2022-12-23 PROCEDURE — 84132 ASSAY OF SERUM POTASSIUM: CPT

## 2022-12-23 PROCEDURE — 83521 IG LIGHT CHAINS FREE EACH: CPT

## 2022-12-23 PROCEDURE — 36415 COLL VENOUS BLD VENIPUNCTURE: CPT

## 2022-12-23 PROCEDURE — 83935 ASSAY OF URINE OSMOLALITY: CPT

## 2022-12-23 PROCEDURE — C8929: CPT

## 2022-12-23 PROCEDURE — 99233 SBSQ HOSP IP/OBS HIGH 50: CPT | Mod: GC

## 2022-12-23 RX ORDER — LEVOFLOXACIN 5 MG/ML
1 INJECTION, SOLUTION INTRAVENOUS
Qty: 0 | Refills: 0 | DISCHARGE

## 2022-12-23 RX ORDER — INSULIN LISPRO 100/ML
10 VIAL (ML) SUBCUTANEOUS
Qty: 1 | Refills: 0
Start: 2022-12-23 | End: 2023-01-21

## 2022-12-23 RX ORDER — HYDRALAZINE HCL 50 MG
1 TABLET ORAL
Qty: 0 | Refills: 0 | DISCHARGE

## 2022-12-23 RX ORDER — INSULIN GLARGINE 100 [IU]/ML
47 INJECTION, SOLUTION SUBCUTANEOUS
Qty: 0 | Refills: 0 | DISCHARGE

## 2022-12-23 RX ORDER — DOCUSATE SODIUM 100 MG
1 CAPSULE ORAL
Qty: 0 | Refills: 0 | DISCHARGE

## 2022-12-23 RX ORDER — HYDRALAZINE HCL 50 MG
1 TABLET ORAL
Qty: 90 | Refills: 0
Start: 2022-12-23 | End: 2023-01-21

## 2022-12-23 RX ORDER — AMLODIPINE BESYLATE 2.5 MG/1
1 TABLET ORAL
Qty: 0 | Refills: 0 | DISCHARGE

## 2022-12-23 RX ORDER — CLOPIDOGREL BISULFATE 75 MG/1
1 TABLET, FILM COATED ORAL
Qty: 30 | Refills: 0
Start: 2022-12-23 | End: 2023-01-21

## 2022-12-23 RX ORDER — ATORVASTATIN CALCIUM 80 MG/1
1 TABLET, FILM COATED ORAL
Qty: 0 | Refills: 0 | DISCHARGE

## 2022-12-23 RX ORDER — SENNA PLUS 8.6 MG/1
1 TABLET ORAL
Qty: 0 | Refills: 0 | DISCHARGE

## 2022-12-23 RX ORDER — CLOPIDOGREL BISULFATE 75 MG/1
1 TABLET, FILM COATED ORAL
Qty: 0 | Refills: 0 | DISCHARGE

## 2022-12-23 RX ORDER — MUPIROCIN 20 MG/G
1 OINTMENT TOPICAL
Qty: 0 | Refills: 0 | DISCHARGE

## 2022-12-23 RX ORDER — ATORVASTATIN CALCIUM 80 MG/1
1 TABLET, FILM COATED ORAL
Qty: 30 | Refills: 0
Start: 2022-12-23 | End: 2023-01-21

## 2022-12-23 RX ORDER — SPIRONOLACTONE 25 MG/1
2 TABLET, FILM COATED ORAL
Qty: 60 | Refills: 0
Start: 2022-12-23 | End: 2023-01-21

## 2022-12-23 RX ORDER — GABAPENTIN 400 MG/1
1 CAPSULE ORAL
Qty: 30 | Refills: 0
Start: 2022-12-23 | End: 2023-01-21

## 2022-12-23 RX ORDER — CARVEDILOL PHOSPHATE 80 MG/1
1 CAPSULE, EXTENDED RELEASE ORAL
Qty: 60 | Refills: 0
Start: 2022-12-23 | End: 2023-01-21

## 2022-12-23 RX ORDER — HYDRALAZINE HCL 50 MG
1 TABLET ORAL
Qty: 0 | Refills: 0 | DISCHARGE
Start: 2022-12-23

## 2022-12-23 RX ORDER — INSULIN LISPRO 100/ML
10 VIAL (ML) SUBCUTANEOUS
Qty: 0 | Refills: 0 | DISCHARGE

## 2022-12-23 RX ORDER — CARVEDILOL PHOSPHATE 80 MG/1
1 CAPSULE, EXTENDED RELEASE ORAL
Qty: 0 | Refills: 0 | DISCHARGE

## 2022-12-23 RX ORDER — ASPIRIN/CALCIUM CARB/MAGNESIUM 324 MG
1 TABLET ORAL
Qty: 0 | Refills: 0 | DISCHARGE

## 2022-12-23 RX ORDER — ASPIRIN/CALCIUM CARB/MAGNESIUM 324 MG
1 TABLET ORAL
Qty: 30 | Refills: 0
Start: 2022-12-23 | End: 2023-01-21

## 2022-12-23 RX ORDER — INSULIN GLARGINE 100 [IU]/ML
47 INJECTION, SOLUTION SUBCUTANEOUS
Qty: 1 | Refills: 0
Start: 2022-12-23 | End: 2023-01-22

## 2022-12-23 RX ORDER — AMLODIPINE BESYLATE 2.5 MG/1
1 TABLET ORAL
Qty: 0 | Refills: 0 | DISCHARGE
Start: 2022-12-23

## 2022-12-23 RX ORDER — DAPAGLIFLOZIN 10 MG/1
1 TABLET, FILM COATED ORAL
Qty: 30 | Refills: 0
Start: 2022-12-23 | End: 2023-01-21

## 2022-12-23 RX ORDER — AMLODIPINE BESYLATE 2.5 MG/1
1 TABLET ORAL
Qty: 30 | Refills: 0
Start: 2022-12-23 | End: 2023-01-21

## 2022-12-23 RX ORDER — NIFEDIPINE 30 MG
1 TABLET, EXTENDED RELEASE 24 HR ORAL
Qty: 30 | Refills: 0
Start: 2022-12-23 | End: 2023-01-21

## 2022-12-23 RX ADMIN — Medication 60 MILLIGRAM(S): at 06:23

## 2022-12-23 RX ADMIN — Medication 100 MILLIGRAM(S): at 13:33

## 2022-12-23 RX ADMIN — BUMETANIDE 132 MILLIGRAM(S): 0.25 INJECTION INTRAMUSCULAR; INTRAVENOUS at 06:23

## 2022-12-23 RX ADMIN — Medication 10 UNIT(S): at 13:27

## 2022-12-23 RX ADMIN — CHLORHEXIDINE GLUCONATE 1 APPLICATION(S): 213 SOLUTION TOPICAL at 13:32

## 2022-12-23 RX ADMIN — SPIRONOLACTONE 50 MILLIGRAM(S): 25 TABLET, FILM COATED ORAL at 06:23

## 2022-12-23 RX ADMIN — Medication 81 MILLIGRAM(S): at 13:26

## 2022-12-23 RX ADMIN — CLOPIDOGREL BISULFATE 75 MILLIGRAM(S): 75 TABLET, FILM COATED ORAL at 13:26

## 2022-12-23 RX ADMIN — Medication 10 UNIT(S): at 09:01

## 2022-12-23 RX ADMIN — GABAPENTIN 100 MILLIGRAM(S): 400 CAPSULE ORAL at 13:26

## 2022-12-23 RX ADMIN — Medication 1: at 13:28

## 2022-12-23 RX ADMIN — CLOTRIMAZOLE AND BETAMETHASONE DIPROPIONATE 1 APPLICATION(S): 10; .5 CREAM TOPICAL at 06:24

## 2022-12-23 RX ADMIN — HEPARIN SODIUM 5000 UNIT(S): 5000 INJECTION INTRAVENOUS; SUBCUTANEOUS at 13:33

## 2022-12-23 RX ADMIN — HEPARIN SODIUM 5000 UNIT(S): 5000 INJECTION INTRAVENOUS; SUBCUTANEOUS at 06:23

## 2022-12-23 NOTE — PROGRESS NOTE ADULT - ATTENDING SUPERVISION STATEMENT
Fellow
Resident

## 2022-12-23 NOTE — DISCHARGE NOTE NURSING/CASE MANAGEMENT/SOCIAL WORK - NSDCPEFALRISK_GEN_ALL_CORE
For information on Fall & Injury Prevention, visit: [email or web address] OR  [email or web address] OR  https://www.cdc.gov/steadi/patient.html

## 2022-12-23 NOTE — PROGRESS NOTE ADULT - ATTENDING COMMENTS
CKD III (SCr 1.8-2, GFR 40s) with proteinuria 1 gram  HFpEF, CAD  Hypervolemia    S/p [facility] with multivessel disease  D/c home today and return for cath after [place]    - Torsemide, spironolactone, carvedilol  - HOLD FOR NOW: SGLT2i Farxiga/dapagliflozin 10mg daily -- until after next week's procedure (do not d/c home on this med)  - We will see him again when he returns for next procedure, and then schedule routine outpatient nephrology care  - Remainder per fellow, will follow

## 2022-12-23 NOTE — DISCHARGE NOTE NURSING/CASE MANAGEMENT/SOCIAL WORK - NSDCFUADDAPPT_GEN_ALL_CORE_FT
APPTS ARE READY TO BE MADE: [x ] YES    Best Family or Patient Contact (if needed):    Additional Information about above appointments (if needed):    1: Primary Care (above) within 2 weeks of d/c   2: One of the cardiologists above within 1 month of d/c   3:     Other comments or requests:

## 2022-12-23 NOTE — PROGRESS NOTE ADULT - PROBLEM SELECTOR PROBLEM 2
2019 novel coronavirus disease (COVID-19)

## 2022-12-23 NOTE — PROGRESS NOTE ADULT - PROVIDER SPECIALTY LIST ADULT
Podiatry
Cardiology
Internal Medicine
Nephrology
Cardiology
Nephrology
Nephrology
Wound Care
Cardiology
Internal Medicine
Nephrology
Internal Medicine

## 2022-12-23 NOTE — PROGRESS NOTE ADULT - PROBLEM SELECTOR PROBLEM 7
Hyperlipidemia

## 2022-12-23 NOTE — PROGRESS NOTE ADULT - SUBJECTIVE AND OBJECTIVE BOX
Candida Castro MD  Internal Medicine, PGY-1        Patient Name: MARY ROBIN  Patient MRN: [id number]    Patient is a 62y old  Male who presents with a chief complaint of HF exacerbation (22 Dec 2022 11:15)      **SUBJECTIVE**    Last 24 hours: No acute events overnight. Patient feels well and states hes ready to go home today. States will follow out as outpatient for PCI.     Review of Systems  Constitutional: No weakness, fevers, or chills  HEENT: No headache, visual changes, lightheadedness, or sore throat  Respiratory: No shortness of breath, cough, wheezing, or hemoptysis  Cardiovascular: No chest pain or palpitations  GI: No abdominal or epigastric pain. No nausea, vomiting, or change in bowel habits. No hematemesis or hematochezia  Neuro: No numbness or weakness  Skin: No itching or rashes      **OBJECTIVE**        VITALS:   T(C): 36.8 (12-23-22 @ 06:19), Max: 37 (12-22-22 @ 12:49)  HR: 56 (12-23-22 @ 06:19) (56 - 66)  BP: 147/56 (12-23-22 @ 06:19) (135/59 - 148/72)  RR: 19 (12-23-22 @ 06:19) (17 - 19)  SpO2: 93% (12-23-22 @ 06:19) (93% - 95%)    GENERAL: NAD, lying in bed comfortably  HEAD:  Normocephalic  EYES: Sclera clear  ENT: Moist mucous membranes  NECK: Supple, No JVD  CHEST/LUNG: Clear to auscultation bilaterally; No rales, rhonchi, wheezing, or rubs. Unlabored respirations  HEART: Regular rate and rhythm; No murmurs, rubs, or gallops  ABDOMEN: BSx4; Soft, nontender, nondistended  EXTREMITIES:  b/l LE edema much improved from admission  NERVOUS SYSTEM:  A&Ox3, no focal deficits   SKIN: chronic venous stasis dermatitis b/l     Labs  12-22    137  |  100  |  58<H>  ----------------------------<  206<H>  5.0   |  25  |  1.91<H>    Ca    9.2      22 Dec 2022 06:53  Phos  4.5     12-22  Mg     2.3     12-22    TPro  6.9  /  Alb  3.9  /  TBili  0.7  /  DBili  x   /  AST  21  /  ALT  27  /  AlkPhos  97  12-22    CBC Full  -  ( 22 Dec 2022 06:53 )  WBC Count : 8.30 K/uL  RBC Count : 3.03 M/uL  Hemoglobin : 9.0 g/dL  Hematocrit : 29.2 %  Platelet Count - Automated : 211 K/uL  Mean Cell Volume : 96.4 fl  Mean Cell Hemoglobin : 29.7 pg  Mean Cell Hemoglobin Concentration : 30.8 gm/dL  Auto Neutrophil # : x  Auto Lymphocyte # : x  Auto Monocyte # : x  Auto Eosinophil # : x  Auto Basophil # : x  Auto Neutrophil % : x  Auto Lymphocyte % : x  Auto Monocyte % : x  Auto Eosinophil % : x  Auto Basophil % : x          PT/INR - ( 22 Dec 2022 06:53 )   PT: 14.0 sec;   INR: 1.20 ratio         PTT - ( 22 Dec 2022 06:53 )  PTT:30.9 sec    POC Glucose  CAPILLARY BLOOD GLUCOSE      POCT Blood Glucose.: 219 mg/dL (22 Dec 2022 22:26)  POCT Blood Glucose.: 116 mg/dL (22 Dec 2022 17:06)  POCT Blood Glucose.: 137 mg/dL (22 Dec 2022 13:10)  POCT Blood Glucose.: 194 mg/dL (22 Dec 2022 08:14)      I&O's  I&O's Summary    22 Dec 2022 07:01  -  23 Dec 2022 07:00  --------------------------------------------------------  IN: 980 mL / OUT: 3175 mL / NET: -2195 mL        UA (if applicable)      Micro (if applicable)      Imaging (if applicable)    Active Medications  MEDICATIONS  (STANDING):  aspirin enteric coated 81 milliGRAM(s) Oral daily  atorvastatin 80 milliGRAM(s) Oral at bedtime  buMETAnide IVPB 4 milliGRAM(s) IV Intermittent every 12 hours  carvedilol 25 milliGRAM(s) Oral every 12 hours  chlorhexidine 4% Liquid 1 Application(s) Topical daily  clopidogrel Tablet 75 milliGRAM(s) Oral daily  clotrimazole/betamethasone Cream 1 Application(s) Topical two times a day  dextrose 5%. 1000 milliLiter(s) (100 mL/Hr) IV Continuous <Continuous>  dextrose 5%. 1000 milliLiter(s) (50 mL/Hr) IV Continuous <Continuous>  dextrose 50% Injectable 25 Gram(s) IV Push once  dextrose 50% Injectable 25 Gram(s) IV Push once  dextrose 50% Injectable 12.5 Gram(s) IV Push once  gabapentin 100 milliGRAM(s) Oral daily  glucagon  Injectable 1 milliGRAM(s) IntraMuscular once  heparin   Injectable 5000 Unit(s) SubCutaneous every 8 hours  hydrALAZINE 100 milliGRAM(s) Oral every 8 hours  insulin glargine Injectable (LANTUS) 40 Unit(s) SubCutaneous at bedtime  insulin lispro (ADMELOG) corrective regimen sliding scale   SubCutaneous at bedtime  insulin lispro (ADMELOG) corrective regimen sliding scale   SubCutaneous three times a day before meals  insulin lispro Injectable (ADMELOG) 10 Unit(s) SubCutaneous three times a day before meals  NIFEdipine XL 60 milliGRAM(s) Oral every 24 hours  spironolactone 50 milliGRAM(s) Oral daily    MEDICATIONS  (PRN):  acetaminophen     Tablet .. 650 milliGRAM(s) Oral every 6 hours PRN Temp greater or equal to 38C (100.4F), Mild Pain (1 - 3)  albuterol/ipratropium for Nebulization 3 milliLiter(s) Nebulizer every 6 hours PRN Shortness of Breath and/or Wheezing  aluminum hydroxide/magnesium hydroxide/simethicone Suspension 30 milliLiter(s) Oral every 4 hours PRN Dyspepsia  bisacodyl 5 milliGRAM(s) Oral every 12 hours PRN Constipation  dextrose Oral Gel 15 Gram(s) Oral once PRN Blood Glucose LESS THAN 70 milliGRAM(s)/deciliter  melatonin 3 milliGRAM(s) Oral at bedtime PRN Insomnia  ondansetron Injectable 4 milliGRAM(s) IV Push every 8 hours PRN Nausea and/or Vomiting  polyethylene glycol 3350 17 Gram(s) Oral two times a day PRN Constipation  senna 2 Tablet(s) Oral at bedtime PRN Constipation  sodium chloride 0.65% Nasal 1 Spray(s) Both Nostrils every 6 hours PRN congestion/dryness

## 2022-12-23 NOTE — PROGRESS NOTE ADULT - PROBLEM SELECTOR PLAN 8
Fluids: fluid restricted 1L  Electrolytes: Replete K > 4, Mg > 2, Phos > 3  Nutrition: Diet CC DASH   PPX  ---VTE: heparin subq  ---GI: po diet  ---Resp: n/a  Access: PIV  Code Status: FULL CODE  Dispo: pending PT eval
Fluids: fluid restricted 1L  Electrolytes: Replete K > 4, Mg > 2, Phos > 3  Nutrition: Diet CC DASH   PPX  ---VTE: heparin subq  ---GI: po diet  ---Resp: n/a  Access: PIV  Code Status: FULL CODE  Dispo: home
Fluids: fluid restricted 1L  Electrolytes: Replete K > 4, Mg > 2, Phos > 3  Nutrition: Diet CC DASH   PPX  ---VTE: heparin subq  ---GI: po diet  ---Resp: n/a  Access: PIV  Code Status: FULL CODE  Dispo: pending PT eval
Fluids: fluid restricted 1L  Electrolytes: Replete K > 4, Mg > 2, Phos > 3  Nutrition: Diet CC DASH   PPX  ---VTE: heparin subq  ---GI: po diet  ---Resp: n/a  Access: PIV  Code Status: FULL CODE  Dispo: pending PT eval

## 2022-12-23 NOTE — PROGRESS NOTE ADULT - PROBLEM SELECTOR PLAN 2
Found positive for COVID-19.   - asymptomatic, breathing comfortable on RA  - ctm O2 requirements
Found positive for COVID-19.   - asymptomatic, breathing comfortable on RA  - ctm O2 requirements
Found positive for COVID-19.   - ctm O2 requirements
Found positive for COVID-19.   - asymptomatic, breathing comfortable on RA  - ctm O2 requirements
Found positive for COVID-19.   - asymptomatic, breathing comfortable on RA  - ctm O2 requirements
Found positive for COVID-19.   - ctm O2 requirements
Found positive for COVID-19.   - asymptomatic, breathing comfortable on RA  - ctm O2 requirements
Found positive for COVID-19.   - ctm O2 requirements
Found positive for COVID-19.   - asymptomatic, breathing comfortable on RA  - ctm O2 requirements
Found positive for COVID-19.   - ctm O2 requirements

## 2022-12-23 NOTE — PROGRESS NOTE ADULT - PROBLEM SELECTOR PLAN 3
h/o CKD, now with BUN/SCr 62/1.80, eGFR 42.  - trend SCr  - cautious diuresis with IV bumex as tolerated  - avoid further nephrotoxic medications
h/o CKD, now with BUN/SCr 62/1.80, eGFR 42.  - trend SCr  - cautious diuresis with IV bumex as tolerated  - avoid further nephrotoxic medications
h/o CKD, now with BUN/SCr 62/1.80, eGFR 42.  - trend SCr  - cautious diuresis with IV bumex as tolerated  - avoid further nephrotoxic medications  - nephro consulted as per cardiology request to confirm pt baseline cr prior to LHC... confirmed.
h/o CKD, now with BUN/SCr 62/1.80, eGFR 42.  - trend SCr  - cautious diuresis with IV bumex as tolerated  - avoid further nephrotoxic medications
h/o CKD, now with BUN/SCr 62/1.80, eGFR 42.  - trend SCr  - cautious diuresis with IV bumex as tolerated  - avoid further nephrotoxic medications  - nephro consulted as per cardiology request to confirm pt baseline cr prior to LHC... confirmed.
h/o CKD, now with BUN/SCr 62/1.80, eGFR 42.  - trend SCr  - cautious diuresis with IV bumex as tolerated  - avoid further nephrotoxic medications  - nephro consulted as per cardiology request to confirm pt baseline cr prior to LHC... confirmed.
h/o CKD, now with BUN/SCr 62/1.80, eGFR 42.  - trend SCr  - cautious diuresis with IV bumex as tolerated  - avoid further nephrotoxic medications
h/o CKD, now with BUN/SCr 62/1.80, eGFR 42.  - trend SCr  - cautious diuresis with IV bumex as tolerated  - avoid further nephrotoxic medications  - nephro consulted as per cardiology request to confirm pt baseline cr prior to LHC... confirmed.
h/o CKD, now with BUN/SCr 62/1.80, eGFR 42.  - trend SCr  - cautious diuresis with IV bumex as tolerated  - avoid further nephrotoxic medications
h/o CKD, now with BUN/SCr 62/1.80, eGFR 42.  - trend SCr  - cautious diuresis with IV bumex as tolerated  - avoid further nephrotoxic medications  - nephro consulted as per cardiology request to confirm pt baseline cr prior to LHC... confirmed.

## 2022-12-23 NOTE — PROGRESS NOTE ADULT - PROBLEM SELECTOR PLAN 1
NSTEMI last month w/ [facility] deferred d/t poor renal function. Now p/w ADHF. Currently following w/ Dr. Shaik Thomson  - c/w diuresis w/ IV bumex 2 bid, goal 2-2.5L per day  - strict I/O, 1L fluid restriction, daily weights  - s/p [facility] 12/21  - appreciate cards recs NSTEMI last month w/ [facility] deferred d/t poor renal function. Now p/w ADHF. Currently following w/ Dr. Shaik Thomson  - c/w diuresis w/ IV bumex 2 bid, goal 2-2.5L per day--> transition to torsemide at d/c  - strict I/O, 1L fluid restriction, daily weights  - s/p [facility] 12/21  - appreciate cards recs

## 2022-12-23 NOTE — PROGRESS NOTE ADULT - PROBLEM SELECTOR PLAN 6
- c/w bumex  - c/w home amlodipine, coreg, hydralazine

## 2022-12-23 NOTE — PROGRESS NOTE ADULT - PROBLEM SELECTOR PROBLEM 1
RADHA (acute kidney injury)
RADHA (acute kidney injury)
Acute on chronic heart failure with preserved ejection fraction (HFpEF)
RADHA (acute kidney injury)
Acute on chronic heart failure with preserved ejection fraction (HFpEF)

## 2022-12-23 NOTE — PROGRESS NOTE ADULT - PROBLEM SELECTOR PLAN 7
- c/w high intensity statin, aspirin

## 2022-12-23 NOTE — PROGRESS NOTE ADULT - PROBLEM SELECTOR PLAN 1
Pt with RADHA in setting of MI and CHF. Pt gives no prior hx of kidney problems. Pt tested positive for COVID 19 on admission. Pt was admitted with significant volume overload on admission. Scr was elevated at 1.96 on 11/11/22 and  was at 2.06 on 11/17/22. SCr was elevated at 1.68 on admission (12/10/22). Now elevated to 2.1 today. UA showed proteinuria quantified at 1gm. US kidney/bladder done on 11/11/22 showed no hydronephrosis. Pt likely with CKD, exact etiology of CKD remains unclear. Tolerated PCI on 12/21/22. Hep B sAg and Hep C Ab, serum free light chain ratio and SIFE all negative for acute pathology. On Bumex 4mg BID. Can start SGLT2i inpatient. Avoid nephrotoxins. Daily weight. Dose meds as per egfr. Bladder scan PRN Discussed that Pt. will have stent placed next week and will repeat labs here. Will hold diuretics before procedure.     If you have any questions, please feel free to contact me  Gurpreet Velasquez  Nephrology Fellow  [phone]; Prefer [employer] TEAMS  (After 5pm or on weekends please page the on-call fellow) Pt with RADHA in setting of MI and CHF. Pt gives no prior hx of kidney problems. Pt tested positive for COVID 19 on admission. Pt was admitted with significant volume overload on admission. Scr was elevated at 1.96 on 11/11/22 and  was at 2.06 on 11/17/22. SCr was elevated at 1.68 on admission (12/10/22). Now elevated to 2.1 today. UA showed proteinuria quantified at 1gm. US kidney/bladder done on 11/11/22 showed no hydronephrosis. Pt likely with CKD, exact etiology of CKD remains unclear. Tolerated PCI on 12/21/22. Hep B sAg and Hep C Ab, serum free light chain ratio and SIFE all negative for acute pathology. On Bumex 4mg BID, will switch to Torsemide 80mg PO daily. Can start SGLT2i inpatient. Avoid nephrotoxins. Daily weight. Dose meds as per egfr. Bladder scan PRN Discussed that Pt. will have stent placed next week and will repeat labs here. Will hold diuretics before procedure.     If you have any questions, please feel free to contact me  Gurpreet Velasquez  Nephrology Fellow  [phone]; Prefer [employer] TEAMS  (After 5pm or on weekends please page the on-call fellow) Pt with RADHA in setting of MI and CHF. Pt gives no prior hx of kidney problems. Pt tested positive for COVID 19 on admission. Pt was admitted with significant volume overload on admission. Scr was elevated at 1.96 on 11/11/22 and  was at 2.06 on 11/17/22. SCr was elevated at 1.68 on admission (12/10/22). Now elevated to 2.1 today. UA showed proteinuria quantified at 1gm. US kidney/bladder done on 11/11/22 showed no hydronephrosis. Pt likely with CKD, exact etiology of CKD remains unclear. Tolerated PCI on 12/21/22. Hep B sAg and Hep C Ab, serum free light chain ratio and SIFE all negative for acute pathology. On Bumex 4mg BID, will switch to Torsemide 80mg PO daily.  Avoid nephrotoxins. Daily weight. Dose meds as per egfr. Bladder scan PRN Discussed that Pt. will have stent placed next week and will repeat labs here. Will hold diuretics before procedure.     If you have any questions, please feel free to contact me  Gurpreet Velasquez  Nephrology Fellow  [phone]; Prefer [employer] TEAMS  (After 5pm or on weekends please page the on-call fellow)

## 2022-12-23 NOTE — PROGRESS NOTE ADULT - SUBJECTIVE AND OBJECTIVE BOX
[facility] DIVISION OF KIDNEY DISEASES AND HYPERTENSION -- FOLLOW UP NOTE  --------------------------------------------------------------------------------  HPI: 62 year old female with PMH of HTN, HLD, DM, CHF admitted with worsening SOB and LE swelling. Pt noted to have worsening of SCr. Nephrology consulted for RADHA. Pt seen and examined. Pt with of hx of CHF. Upon review of labs, Scr was elevated at 1.96 on 11/11/22/. No prior labs available to review. Pt says he has not seen any nephrologist in the past. Pt was admitted in November 2022 with similar complaints. Scr was elevated at 2.06 on 11/17/22.  UA done on 12/9/22 showed proteinuria. Pt currently receiving Bumex for fluid overload. Pt complaining of persistent LE swelling. Denies fever, chills, nausea, vomiting, abdominal pain, HA during rounds. Pt is nonoliguric.    Pt. seen today, endorsers improvement with breathing. He had [facility]. Good UOP on Bumex BID. Remains on NC. For discharge today. Discussed follow up next week.         PAST HISTORY  --------------------------------------------------------------------------------  No significant changes to PMH, PSH, FHx, SHx, unless otherwise noted    ALLERGIES & MEDICATIONS  --------------------------------------------------------------------------------  Allergies    No Known Allergies    Intolerances      Standing Inpatient Medications  aspirin enteric coated 81 milliGRAM(s) Oral daily  atorvastatin 80 milliGRAM(s) Oral at bedtime  buMETAnide IVPB 4 milliGRAM(s) IV Intermittent every 12 hours  carvedilol 25 milliGRAM(s) Oral every 12 hours  chlorhexidine 4% Liquid 1 Application(s) Topical daily  clopidogrel Tablet 75 milliGRAM(s) Oral daily  clotrimazole/betamethasone Cream 1 Application(s) Topical two times a day  dextrose 5%. 1000 milliLiter(s) IV Continuous <Continuous>  dextrose 5%. 1000 milliLiter(s) IV Continuous <Continuous>  dextrose 50% Injectable 25 Gram(s) IV Push once  dextrose 50% Injectable 12.5 Gram(s) IV Push once  dextrose 50% Injectable 25 Gram(s) IV Push once  gabapentin 100 milliGRAM(s) Oral daily  glucagon  Injectable 1 milliGRAM(s) IntraMuscular once  heparin   Injectable 5000 Unit(s) SubCutaneous every 8 hours  hydrALAZINE 100 milliGRAM(s) Oral every 8 hours  insulin glargine Injectable (LANTUS) 40 Unit(s) SubCutaneous at bedtime  insulin lispro (ADMELOG) corrective regimen sliding scale   SubCutaneous at bedtime  insulin lispro (ADMELOG) corrective regimen sliding scale   SubCutaneous three times a day before meals  insulin lispro Injectable (ADMELOG) 10 Unit(s) SubCutaneous three times a day before meals  NIFEdipine XL 60 milliGRAM(s) Oral every 24 hours  spironolactone 50 milliGRAM(s) Oral daily    PRN Inpatient Medications  acetaminophen     Tablet .. 650 milliGRAM(s) Oral every 6 hours PRN  albuterol/ipratropium for Nebulization 3 milliLiter(s) Nebulizer every 6 hours PRN  aluminum hydroxide/magnesium hydroxide/simethicone Suspension 30 milliLiter(s) Oral every 4 hours PRN  bisacodyl 5 milliGRAM(s) Oral every 12 hours PRN  dextrose Oral Gel 15 Gram(s) Oral once PRN  melatonin 3 milliGRAM(s) Oral at bedtime PRN  ondansetron Injectable 4 milliGRAM(s) IV Push every 8 hours PRN  polyethylene glycol 3350 17 Gram(s) Oral two times a day PRN  senna 2 Tablet(s) Oral at bedtime PRN  sodium chloride 0.65% Nasal 1 Spray(s) Both Nostrils every 6 hours PRN      REVIEW OF SYSTEMS  --------------------------------------------------------------------------------  AS per HPI    VITALS/PHYSICAL EXAM  --------------------------------------------------------------------------------  T(C): 36.8 (12-23-22 @ 06:19), Max: 37 (12-22-22 @ 12:49)  HR: 56 (12-23-22 @ 06:19) (56 - 66)  BP: 147/56 (12-23-22 @ 06:19) (135/59 - 148/72)  RR: 19 (12-23-22 @ 06:19) (17 - 19)  SpO2: 93% (12-23-22 @ 06:19) (93% - 95%)  Wt(kg): --        12-22-22 @ 07:01  -  12-23-22 @ 07:00  --------------------------------------------------------  IN: 1030 mL / OUT: 3175 mL / NET: -2145 mL      Physical Exam:  	Gen: NAD  	HEENT: MMM  	Pulm: CTA B/L  	CV: S1S2  	Abd: Soft, +BS   	Ext: No LE edema B/L  	Neuro: Awake  	Skin: Warm and dry  	Vascular access: Peripheral      LABS/STUDIES  --------------------------------------------------------------------------------              9.2    7.95  >-----------<  208      [12-23-22 @ 07:06]              29.6     139  |  101  |  63  ----------------------------<  155      [12-23-22 @ 07:06]  5.2   |  26  |  2.14        Ca     9.4     [12-23-22 @ 07:06]      Mg     2.4     [12-23-22 @ 07:06]      Phos  5.0     [12-23-22 @ 07:06]    TPro  6.9  /  Alb  3.9  /  TBili  0.7  /  DBili  x   /  AST  21  /  ALT  27  /  AlkPhos  97  [12-22-22 @ 06:53]    PT/INR: PT 14.0 , INR 1.20       [12-22-22 @ 06:53]  PTT: 30.9       [12-22-22 @ 06:53]      Creatinine Trend:  SCr 2.14 [12-23 @ 07:06]  SCr 1.91 [12-22 @ 06:53]  SCr 2.11 [12-21 @ 09:07]  SCr 1.89 [12-20 @ 07:13]  SCr 2.02 [12-19 @ 09:35]    Urinalysis - [12-19-22 @ 13:55]      Color Light Yellow / Appearance Clear / SG 1.012 / pH 5.5      Gluc Negative / Ketone Negative  / Bili Negative / Urobili Negative       Blood Negative / Protein 30 mg/dL / Leuk Est Negative / Nitrite Negative      RBC 1 / WBC 0 / Hyaline 1 / Gran  / Sq Epi  / Non Sq Epi 0 / Bacteria Negative    Urine Creatinine 55      [12-19-22 @ 13:54]  Urine Protein 58      [12-19-22 @ 13:54]  Urine Sodium 67      [12-19-22 @ 13:54]  Urine Urea Nitrogen 562      [12-19-22 @ 13:54]  Urine Potassium 27      [12-19-22 @ 13:54]  Urine Osmolality 389      [12-19-22 @ 13:55]    Lipid: chol 67, TG 63, HDL 26, LDL --      [12-10-22 @ 06:55]    HBsAb Nonreact      [12-17-22 @ 11:24]  HBsAg Nonreact      [12-16-22 @ 11:38]  HCV 0.18, Nonreact      [12-10-22 @ 06:55]    Free Light Chains: kappa 10.41, lambda 4.40, ratio = 2.37      [12-16 @ 11:38]

## 2022-12-23 NOTE — PROGRESS NOTE ADULT - PROBLEM SELECTOR PROBLEM 3
Stage 3b chronic kidney disease

## 2022-12-23 NOTE — PROGRESS NOTE ADULT - REASON FOR ADMISSION
HF exacerbation

## 2022-12-23 NOTE — PROGRESS NOTE ADULT - ATTENDING COMMENTS
62M former smoker w/ HFpEF (TTE 11/14/22 EF 57%), h/o NSTEMI, HTN, HLD, T2DM on insulin, p/w worsening SOB, leg/scrotal edema, c/f ADHF, found covid-19 positive.    #Acute on chronic systolic HF: (recent echo with EF 40-45%)   recent hosp ~ 1 month ago. not discharged home on diuretics due to renal dysfunction.   transition to oral bumex today and monitor StrictI+Os. daily weights. keep neg.   s/p cardiac cath 12/21 showing multivessel dz.   per discussion with card - plan for high risk PCI next week.   #COVID+: clinically with some mild cough. no significant dyspnea    cont have episode of hypoxia intermittently. will likely need O2.   Will monitor off treatment.   #RADHA on CKD3: Cr plateaued likely at baseline now. will monitor while cont with diuresis.   strict I+Os, weight, Cr.  #DM2: improved BS/ pt with adequate PO intake. titrate basal insulin.    d/c planning home today  d/c time 40 mins

## 2022-12-23 NOTE — DISCHARGE NOTE NURSING/CASE MANAGEMENT/SOCIAL WORK - PATIENT PORTAL LINK FT
You can access the FollowMyHealth Patient Portal offered by [facility] by registering at the following website: [email or web address]. By joining [facility]’s FollowMyHealth portal, you will also be able to view your health information using other applications (apps) compatible with our system.

## 2022-12-26 LAB — INTERPRETATION SERPL IFE-IMP: SIGNIFICANT CHANGE UP

## 2022-12-30 PROBLEM — I10 ESSENTIAL (PRIMARY) HYPERTENSION: Chronic | Status: ACTIVE | Noted: 2022-11-11

## 2022-12-30 PROBLEM — E11.9 TYPE 2 DIABETES MELLITUS WITHOUT COMPLICATIONS: Chronic | Status: ACTIVE | Noted: 2022-11-11

## 2022-12-30 PROBLEM — I25.10 ATHEROSCLEROTIC HEART DISEASE OF NATIVE CORONARY ARTERY WITHOUT ANGINA PECTORIS: Chronic | Status: ACTIVE | Noted: 2022-12-09

## 2022-12-30 PROBLEM — I10 ESSENTIAL (PRIMARY) HYPERTENSION: Chronic | Status: ACTIVE | Noted: 2022-12-09

## 2022-12-30 PROBLEM — E78.5 HYPERLIPIDEMIA, UNSPECIFIED: Chronic | Status: ACTIVE | Noted: 2022-11-11

## 2023-01-03 DIAGNOSIS — Z11.52 ENCOUNTER FOR SCREENING FOR COVID-19: ICD-10-CM

## 2023-01-06 ENCOUNTER — OUTPATIENT (OUTPATIENT)
Dept: OUTPATIENT SERVICES | Facility: HOSPITAL | Age: 63
LOS: 1 days | End: 2023-01-06
Payer: MEDICAID

## 2023-01-06 ENCOUNTER — TRANSCRIPTION ENCOUNTER (OUTPATIENT)
Age: 63
End: 2023-01-06

## 2023-01-06 VITALS
HEART RATE: 64 BPM | SYSTOLIC BLOOD PRESSURE: 155 MMHG | RESPIRATION RATE: 17 BRPM | OXYGEN SATURATION: 98 % | TEMPERATURE: 98 F | DIASTOLIC BLOOD PRESSURE: 72 MMHG

## 2023-01-06 VITALS
HEIGHT: 74 IN | HEART RATE: 67 BPM | DIASTOLIC BLOOD PRESSURE: 74 MMHG | SYSTOLIC BLOOD PRESSURE: 167 MMHG | RESPIRATION RATE: 14 BRPM | OXYGEN SATURATION: 96 % | TEMPERATURE: 98 F | WEIGHT: 257.94 LBS

## 2023-01-06 DIAGNOSIS — I25.10 ATHEROSCLEROTIC HEART DISEASE OF NATIVE CORONARY ARTERY WITHOUT ANGINA PECTORIS: ICD-10-CM

## 2023-01-06 DIAGNOSIS — Z90.49 ACQUIRED ABSENCE OF OTHER SPECIFIED PARTS OF DIGESTIVE TRACT: Chronic | ICD-10-CM

## 2023-01-06 DIAGNOSIS — Z89.421 ACQUIRED ABSENCE OF OTHER RIGHT TOE(S): Chronic | ICD-10-CM

## 2023-01-06 DIAGNOSIS — R60.9 EDEMA, UNSPECIFIED: ICD-10-CM

## 2023-01-06 LAB
ALBUMIN SERPL ELPH-MCNC: 4.8 G/DL — SIGNIFICANT CHANGE UP (ref 3.3–5)
ALP SERPL-CCNC: 92 U/L — SIGNIFICANT CHANGE UP (ref 40–120)
ALT FLD-CCNC: 27 U/L — SIGNIFICANT CHANGE UP (ref 10–45)
ANION GAP SERPL CALC-SCNC: 13 MMOL/L — SIGNIFICANT CHANGE UP (ref 5–17)
AST SERPL-CCNC: 21 U/L — SIGNIFICANT CHANGE UP (ref 10–40)
BILIRUB SERPL-MCNC: 0.5 MG/DL — SIGNIFICANT CHANGE UP (ref 0.2–1.2)
BUN SERPL-MCNC: 65 MG/DL — HIGH (ref 7–23)
CALCIUM SERPL-MCNC: 10.1 MG/DL — SIGNIFICANT CHANGE UP (ref 8.4–10.5)
CHLORIDE SERPL-SCNC: 100 MMOL/L — SIGNIFICANT CHANGE UP (ref 96–108)
CO2 SERPL-SCNC: 24 MMOL/L — SIGNIFICANT CHANGE UP (ref 22–31)
CREAT SERPL-MCNC: 2.05 MG/DL — HIGH (ref 0.5–1.3)
EGFR: 36 ML/MIN/1.73M2 — LOW
GLUCOSE BLDC GLUCOMTR-MCNC: 164 MG/DL — HIGH (ref 70–99)
GLUCOSE BLDC GLUCOMTR-MCNC: 186 MG/DL — HIGH (ref 70–99)
GLUCOSE BLDC GLUCOMTR-MCNC: 206 MG/DL — HIGH (ref 70–99)
GLUCOSE SERPL-MCNC: 206 MG/DL — HIGH (ref 70–99)
HCT VFR BLD CALC: 35 % — LOW (ref 39–50)
HGB BLD-MCNC: 11.2 G/DL — LOW (ref 13–17)
MCHC RBC-ENTMCNC: 29.8 PG — SIGNIFICANT CHANGE UP (ref 27–34)
MCHC RBC-ENTMCNC: 32 GM/DL — SIGNIFICANT CHANGE UP (ref 32–36)
MCV RBC AUTO: 93.1 FL — SIGNIFICANT CHANGE UP (ref 80–100)
NRBC # BLD: 0 /100 WBCS — SIGNIFICANT CHANGE UP (ref 0–0)
PLATELET # BLD AUTO: 272 K/UL — SIGNIFICANT CHANGE UP (ref 150–400)
POTASSIUM SERPL-MCNC: 4.5 MMOL/L — SIGNIFICANT CHANGE UP (ref 3.5–5.3)
POTASSIUM SERPL-SCNC: 4.5 MMOL/L — SIGNIFICANT CHANGE UP (ref 3.5–5.3)
PROT SERPL-MCNC: 8.5 G/DL — HIGH (ref 6–8.3)
RBC # BLD: 3.76 M/UL — LOW (ref 4.2–5.8)
RBC # FLD: 14.7 % — HIGH (ref 10.3–14.5)
SODIUM SERPL-SCNC: 137 MMOL/L — SIGNIFICANT CHANGE UP (ref 135–145)
WBC # BLD: 9.53 K/UL — SIGNIFICANT CHANGE UP (ref 3.8–10.5)
WBC # FLD AUTO: 9.53 K/UL — SIGNIFICANT CHANGE UP (ref 3.8–10.5)

## 2023-01-06 PROCEDURE — C9600: CPT | Mod: LM

## 2023-01-06 PROCEDURE — C1894: CPT

## 2023-01-06 PROCEDURE — C1887: CPT

## 2023-01-06 PROCEDURE — 93005 ELECTROCARDIOGRAM TRACING: CPT

## 2023-01-06 PROCEDURE — C1725: CPT

## 2023-01-06 PROCEDURE — C1769: CPT

## 2023-01-06 PROCEDURE — C1874: CPT

## 2023-01-06 PROCEDURE — 85027 COMPLETE CBC AUTOMATED: CPT

## 2023-01-06 PROCEDURE — C1753: CPT

## 2023-01-06 PROCEDURE — 92978 ENDOLUMINL IVUS OCT C 1ST: CPT | Mod: LM

## 2023-01-06 PROCEDURE — 80053 COMPREHEN METABOLIC PANEL: CPT

## 2023-01-06 PROCEDURE — 82962 GLUCOSE BLOOD TEST: CPT

## 2023-01-06 RX ORDER — ACETAMINOPHEN 500 MG
1000 TABLET ORAL ONCE
Refills: 0 | Status: DISCONTINUED | OUTPATIENT
Start: 2023-01-06 | End: 2023-01-06

## 2023-01-06 RX ORDER — SODIUM CHLORIDE 9 MG/ML
1000 INJECTION INTRAMUSCULAR; INTRAVENOUS; SUBCUTANEOUS
Refills: 0 | Status: DISCONTINUED | OUTPATIENT
Start: 2023-01-06 | End: 2023-01-06

## 2023-01-06 RX ORDER — ASPIRIN/CALCIUM CARB/MAGNESIUM 324 MG
81 TABLET ORAL DAILY
Refills: 0 | Status: DISCONTINUED | OUTPATIENT
Start: 2023-01-06 | End: 2023-01-06

## 2023-01-06 RX ORDER — CLOPIDOGREL BISULFATE 75 MG/1
1 TABLET, FILM COATED ORAL
Qty: 90 | Refills: 3
Start: 2023-01-06 | End: 2023-12-31

## 2023-01-06 RX ORDER — DEXTROSE 50 % IN WATER 50 %
15 SYRINGE (ML) INTRAVENOUS ONCE
Refills: 0 | Status: DISCONTINUED | OUTPATIENT
Start: 2023-01-06 | End: 2023-01-06

## 2023-01-06 RX ORDER — INSULIN LISPRO 100/ML
VIAL (ML) SUBCUTANEOUS AT BEDTIME
Refills: 0 | Status: DISCONTINUED | OUTPATIENT
Start: 2023-01-06 | End: 2023-01-06

## 2023-01-06 RX ORDER — CARVEDILOL PHOSPHATE 80 MG/1
25 CAPSULE, EXTENDED RELEASE ORAL EVERY 12 HOURS
Refills: 0 | Status: DISCONTINUED | OUTPATIENT
Start: 2023-01-06 | End: 2023-01-06

## 2023-01-06 RX ORDER — ASPIRIN/CALCIUM CARB/MAGNESIUM 324 MG
1 TABLET ORAL
Qty: 90 | Refills: 3
Start: 2023-01-06 | End: 2023-12-31

## 2023-01-06 RX ORDER — GABAPENTIN 400 MG/1
100 CAPSULE ORAL DAILY
Refills: 0 | Status: DISCONTINUED | OUTPATIENT
Start: 2023-01-06 | End: 2023-01-06

## 2023-01-06 RX ORDER — CLOPIDOGREL BISULFATE 75 MG/1
75 TABLET, FILM COATED ORAL DAILY
Refills: 0 | Status: DISCONTINUED | OUTPATIENT
Start: 2023-01-06 | End: 2023-01-06

## 2023-01-06 RX ORDER — DEXTROSE 50 % IN WATER 50 %
25 SYRINGE (ML) INTRAVENOUS ONCE
Refills: 0 | Status: DISCONTINUED | OUTPATIENT
Start: 2023-01-06 | End: 2023-01-06

## 2023-01-06 RX ORDER — GLUCAGON INJECTION, SOLUTION 0.5 MG/.1ML
1 INJECTION, SOLUTION SUBCUTANEOUS ONCE
Refills: 0 | Status: DISCONTINUED | OUTPATIENT
Start: 2023-01-06 | End: 2023-01-06

## 2023-01-06 RX ORDER — SODIUM CHLORIDE 9 MG/ML
1000 INJECTION, SOLUTION INTRAVENOUS
Refills: 0 | Status: DISCONTINUED | OUTPATIENT
Start: 2023-01-06 | End: 2023-01-06

## 2023-01-06 RX ORDER — INSULIN LISPRO 100/ML
8 VIAL (ML) SUBCUTANEOUS
Refills: 0 | Status: DISCONTINUED | OUTPATIENT
Start: 2023-01-06 | End: 2023-01-06

## 2023-01-06 RX ORDER — INSULIN LISPRO 100/ML
VIAL (ML) SUBCUTANEOUS
Refills: 0 | Status: DISCONTINUED | OUTPATIENT
Start: 2023-01-06 | End: 2023-01-06

## 2023-01-06 RX ORDER — ATORVASTATIN CALCIUM 80 MG/1
80 TABLET, FILM COATED ORAL AT BEDTIME
Refills: 0 | Status: DISCONTINUED | OUTPATIENT
Start: 2023-01-06 | End: 2023-01-06

## 2023-01-06 RX ORDER — DEXTROSE 50 % IN WATER 50 %
12.5 SYRINGE (ML) INTRAVENOUS ONCE
Refills: 0 | Status: DISCONTINUED | OUTPATIENT
Start: 2023-01-06 | End: 2023-01-06

## 2023-01-06 RX ORDER — SPIRONOLACTONE 25 MG/1
50 TABLET, FILM COATED ORAL DAILY
Refills: 0 | Status: DISCONTINUED | OUTPATIENT
Start: 2023-01-06 | End: 2023-01-06

## 2023-01-06 RX ORDER — SODIUM CHLORIDE 9 MG/ML
250 INJECTION INTRAMUSCULAR; INTRAVENOUS; SUBCUTANEOUS ONCE
Refills: 0 | Status: COMPLETED | OUTPATIENT
Start: 2023-01-06 | End: 2023-01-06

## 2023-01-06 RX ORDER — INSULIN GLARGINE 100 [IU]/ML
37 INJECTION, SOLUTION SUBCUTANEOUS AT BEDTIME
Refills: 0 | Status: DISCONTINUED | OUTPATIENT
Start: 2023-01-06 | End: 2023-01-06

## 2023-01-06 RX ORDER — HYDRALAZINE HCL 50 MG
100 TABLET ORAL EVERY 8 HOURS
Refills: 0 | Status: DISCONTINUED | OUTPATIENT
Start: 2023-01-06 | End: 2023-01-06

## 2023-01-06 RX ADMIN — CLOPIDOGREL BISULFATE 75 MILLIGRAM(S): 75 TABLET, FILM COATED ORAL at 11:13

## 2023-01-06 RX ADMIN — SODIUM CHLORIDE 150 MILLILITER(S): 9 INJECTION INTRAMUSCULAR; INTRAVENOUS; SUBCUTANEOUS at 14:55

## 2023-01-06 RX ADMIN — SODIUM CHLORIDE 75 MILLILITER(S): 9 INJECTION INTRAMUSCULAR; INTRAVENOUS; SUBCUTANEOUS at 11:13

## 2023-01-06 RX ADMIN — SODIUM CHLORIDE 500 MILLILITER(S): 9 INJECTION INTRAMUSCULAR; INTRAVENOUS; SUBCUTANEOUS at 11:13

## 2023-01-06 RX ADMIN — Medication 8 UNIT(S): at 16:23

## 2023-01-06 RX ADMIN — Medication 81 MILLIGRAM(S): at 11:13

## 2023-01-06 RX ADMIN — CARVEDILOL PHOSPHATE 25 MILLIGRAM(S): 80 CAPSULE, EXTENDED RELEASE ORAL at 17:02

## 2023-01-06 RX ADMIN — Medication 80 MILLIGRAM(S): at 16:22

## 2023-01-06 RX ADMIN — Medication 2: at 16:23

## 2023-01-06 NOTE — H&P CARDIOLOGY - EXTREMITIES COMMENTS
BLE with healing cellulitis. Both legs knees to ankle are erythematous, NO increased warmth (relative to rest of skin), + scattered plaques, 1+ pitting edema.

## 2023-01-06 NOTE — H&P CARDIOLOGY - HISTORY OF PRESENT ILLNESS
62 year old male former smoker with PMH HFpEF (TTE 12/13/22 EF 60-65%), NSTEMI, HTN, HLD, T2DM on insulin, CKD. Recent hospital admissions:  11/11-17/22 as transfer from [facility] for NSTEMI, CHF exacerbation, BLE cellulitis, ECG during that visit revealed left anterior hemiblock old lateral wall MI and nonspecific ST-T wave abnormalities. 12/9-12/23/22 for medical optimization including diuresis, and [facility] for HF exacerbation, Covid +, and recent NSTEMI.   < from: Cardiac Catheterization (12.21.22 @ 16:49) >  Diagnostic Findings:   Coronary Angiography   The coronary circulation is left dominant.    Left main artery: Angiography shows no disease.    Proximal left anterior descending: Angiography shows severe atherosclerosis. There is a 70 % stenosis.  Proximal circumflex: Angiography shows severe atherosclerosis. There is a 70 % stenosis.  < end of copied text >  1/6/23 for high risk PCI with Dr Carlson

## 2023-01-06 NOTE — ASU DISCHARGE PLAN (ADULT/PEDIATRIC) - NS MD DC FALL RISK RISK
For information on Fall & Injury Prevention, visit: [email or web address] OR  [email or web address] OR  https://www.cdc.gov/steadi/patient.html

## 2023-01-06 NOTE — H&P CARDIOLOGY - NS MD HP PULSE DORSALIS
+1 bilat
Lack of insight into violence risk/need for treatment/Community stressors that increase the risk of destabilization

## 2023-01-06 NOTE — ASU PATIENT PROFILE, ADULT - NSICDXPASTSURGICALHX_GEN_ALL_CORE_FT
PAST SURGICAL HISTORY:  H/O amputation of lesser toe, right small toe    History of cholecystectomy     History of cholecystectomy     S/P amputation of lesser toe, right

## 2023-01-06 NOTE — ASU PATIENT PROFILE, ADULT - NSICDXPASTMEDICALHX_GEN_ALL_CORE_FT
PAST MEDICAL HISTORY:  CAD (coronary artery disease)     DM (diabetes mellitus)     HLD (hyperlipidemia)     HTN (hypertension)     HTN (hypertension)     Type 2 diabetes mellitus      not applicable (Male)

## 2023-01-06 NOTE — H&P CARDIOLOGY - NSICDXPASTMEDICALHX_GEN_ALL_CORE_FT
PAST MEDICAL HISTORY:  CAD (coronary artery disease)     Cellulitis     Chronic kidney disease, unspecified CKD stage     HLD (hyperlipidemia)     HTN (hypertension)     HTN (hypertension)     NSTEMI (non-ST elevation myocardial infarction)     Type 2 diabetes mellitus

## 2023-01-06 NOTE — ASU DISCHARGE PLAN (ADULT/PEDIATRIC) - ASU DC SPECIAL INSTRUCTIONSFT
Wound Care:   the day AFTER your procedure remove bandage GENTLY, and clean using  mild soap and gentle warm, water stream, pat dry. leave OPEN to air. YOU MAY SHOWER   DO NOT apply lotions, creams, ointments, powder, perfumes to your incision site  DO NOT SOAK your site for 1 week ( no baths, no pools, no tubs, etc...)  Check  your groin and /or wrist daily. A small amount of bruising, and soreness are normal    ACTIVITY: for 24 hours   - DO NOT DRIVE  - DO NOT make any important decisions or sign legal documents   - DO NOT operate heavy machineries   - you may resume sexual activity in 48 hours, unless otherwise instructed by your cardiologist     If your procedure was done through the WRIST: for the NEXT 3DAYS:  - avoid pushing, pulling, with that affected wrist   - avoid repeated movement of that hand and wrist ( eg: typing, hammering)  - DO NOT LIFT anything more than 5 lbs     If your procedure was done through the GROIN: for the NEXT 5 DAYS  - Limit climbing stairs, DO NOT soak in bathtub or pool  - no strenuous activities, pushing, pulling, straining  - Do not lift anything 10lbs or heavier     MEDICATION:   take your medications as explained ( see discharge paperwork)   If you received a STENT, you will be taking antiplatelet medications to KEEP YOUR STENT OPEN ( eg: Aspirin, Plavix, Brilinta, Effient, etc).  Take as prescribed DO NOT STOP taking them without consulting with your cardiologist first.     Follow heart healthy diet recommended by your doctor, if you smoke STOP SMOKING ( may call [phone] for center of tobacco control if you need assistance)     CALL your doctor to make appointment in 2 WEEKS     ***CALL YOUR DOCTOR***  if you experience: fever, chills, body aches, or severe pain, swelling, redness, heat or yellow discharge at incision site  If you experience bleeding or excruciating pain at the procedural site, swelling ( golf ball size) at your procedural site  If you experience CHEST PAIN  If you experience extremity numbness, tingling, temperature change ( of your procedural site)   If you are unable to reach your doctor, you may contact:   -Cardiology Office at [facility] at [phone] or   - [facility] [phone]  - [facility] [phone]

## 2023-01-06 NOTE — CHART NOTE - NSCHARTNOTEFT_GEN_A_CORE
H&P and hospital course from 12/9-12/23/22 reviewed    pt here for LHC/high risk PCI, no significant/material changes to make   pt feeling well and without c/o   Creat in Dec 2022 1.9-2.1, repeat labs pending. IVF ordered as d/w Dr Carlson. H&P and hospital course from 12/9-12/23/22 reviewed      < from: Cardiac Catheterization (12.21.22 @ 16:49) >  Diagnostic Findings:   Coronary Angiography   The coronary circulation is left dominant.    Left main artery: Angiography shows no disease.    Proximal left anterior descending: Angiography shows severe atherosclerosis. There is a 70 % stenosis.  Proximal circumflex: Angiography shows severe atherosclerosis. There is a 70 % stenosis.  < end of copied text >    pt here for LHC/high risk PCI, no significant/material changes to make   pt feeling well and without c/o   Creat in Dec 2022 1.9-2.1, repeat labs pending. IVF ordered as d/w Dr Carlson.

## 2023-01-06 NOTE — ASU PATIENT PROFILE, ADULT - FALL HARM RISK - HARM RISK INTERVENTIONS

## 2023-01-06 NOTE — ASU DISCHARGE PLAN (ADULT/PEDIATRIC) - CARE PROVIDER_API CALL
Griffin Loaiza)  Internal Medicine  [phone] [place] [address]  Phone: [phone]  Fax: [phone]  Established Patient  Follow Up Time: 2 weeks

## 2023-01-06 NOTE — PATIENT PROFILE ADULT - FALL HARM RISK - HARM RISK INTERVENTIONS

## 2023-01-07 LAB — SARS-COV-2 N GENE NPH QL NAA+PROBE: NOT DETECTED

## 2023-01-23 DIAGNOSIS — I25.2 OLD MYOCARDIAL INFARCTION: ICD-10-CM

## 2023-01-23 DIAGNOSIS — Z87.891 PERSONAL HISTORY OF NICOTINE DEPENDENCE: ICD-10-CM

## 2023-01-23 DIAGNOSIS — Z89.421 ACQUIRED ABSENCE OF OTHER RIGHT TOE(S): ICD-10-CM

## 2023-01-23 DIAGNOSIS — I50.32 CHRONIC DIASTOLIC (CONGESTIVE) HEART FAILURE: ICD-10-CM

## 2023-01-23 DIAGNOSIS — N18.9 CHRONIC KIDNEY DISEASE, UNSPECIFIED: ICD-10-CM

## 2023-01-23 DIAGNOSIS — I25.10 ATHEROSCLEROTIC HEART DISEASE OF NATIVE CORONARY ARTERY WITHOUT ANGINA PECTORIS: ICD-10-CM

## 2023-01-23 DIAGNOSIS — Z79.4 LONG TERM (CURRENT) USE OF INSULIN: ICD-10-CM

## 2023-01-23 DIAGNOSIS — E78.5 HYPERLIPIDEMIA, UNSPECIFIED: ICD-10-CM

## 2023-01-23 DIAGNOSIS — E11.22 TYPE 2 DIABETES MELLITUS WITH DIABETIC CHRONIC KIDNEY DISEASE: ICD-10-CM

## 2023-01-23 DIAGNOSIS — I13.0 HYPERTENSIVE HEART AND CHRONIC KIDNEY DISEASE WITH HEART FAILURE AND STAGE 1 THROUGH STAGE 4 CHRONIC KIDNEY DISEASE, OR UNSPECIFIED CHRONIC KIDNEY DISEASE: ICD-10-CM

## 2023-01-23 DIAGNOSIS — Z79.82 LONG TERM (CURRENT) USE OF ASPIRIN: ICD-10-CM

## 2023-01-23 RX ORDER — INSULIN LISPRO 100/ML
10 VIAL (ML) SUBCUTANEOUS
Qty: 0 | Refills: 0 | DISCHARGE

## 2023-03-01 ENCOUNTER — RX ONLY (RX ONLY)
Age: 63
End: 2023-03-01

## 2023-03-01 ENCOUNTER — OFFICE (OUTPATIENT)
Facility: LOCATION | Age: 63
Setting detail: OPHTHALMOLOGY
End: 2023-03-01
Payer: MEDICAID

## 2023-03-01 DIAGNOSIS — H25.12: ICD-10-CM

## 2023-03-01 DIAGNOSIS — H25.13: ICD-10-CM

## 2023-03-01 PROBLEM — H25.11 CATARACT SENILE NUCLEAR SCLEROSIS; RIGHT EYE, LEFT EYE, BOTH EYES: Status: ACTIVE | Noted: 2023-03-01

## 2023-03-01 PROCEDURE — 92014 COMPRE OPH EXAM EST PT 1/>: CPT | Performed by: OPHTHALMOLOGY

## 2023-03-01 PROCEDURE — 92136 OPHTHALMIC BIOMETRY: CPT | Performed by: OPHTHALMOLOGY

## 2023-03-01 ASSESSMENT — REFRACTION_MANIFEST
OD_SPHERE: +3.00
OD_CYLINDER: -1.75
OS_SPHERE: UNABLE
OS_VA1: 20/UNABLE
OD_SPHERE: +2.50
OD_AXIS: 066
OS_CYLINDER: -0.25
OD_CYLINDER: -2.25
OS_SPHERE: -18.75
OD_VA1: 20/125
OD_AXIS: 070
OD_VA1: 20/80-2
OS_AXIS: 090

## 2023-03-01 ASSESSMENT — KERATOMETRY
OS_K1POWER_DIOPTERS: 44.00
OS_AXISANGLE_DEGREES: 116
OD_K1POWER_DIOPTERS: 42.75
OS_K2POWER_DIOPTERS: 43.50
OD_K2POWER_DIOPTERS: 43.75
OD_AXISANGLE_DEGREES: 125

## 2023-03-01 ASSESSMENT — VISUAL ACUITY
OS_BCVA: 20/200
OD_BCVA: 20/CF1FT

## 2023-03-01 ASSESSMENT — CONFRONTATIONAL VISUAL FIELD TEST (CVF)
OD_FINDINGS: FULL
OS_FINDINGS: FULL

## 2023-03-01 ASSESSMENT — REFRACTION_AUTOREFRACTION
OD_SPHERE: UNABLE
OS_SPHERE: UNABLE

## 2023-03-01 ASSESSMENT — AXIALLENGTH_DERIVED
OD_AL: 23.1562
OD_AL: 22.8781
OS_AL: 34.0833

## 2023-03-01 ASSESSMENT — SCHIRMERS TEST
OS_SCHIRMERS: 5
OD_SCHIRMERS: 5

## 2023-03-01 ASSESSMENT — SPHEQUIV_DERIVED
OD_SPHEQUIV: 2.125
OD_SPHEQUIV: 1.375
OS_SPHEQUIV: -18.875

## 2023-03-15 NOTE — DISCHARGE NOTE NURSING/CASE MANAGEMENT/SOCIAL WORK - NSDCPETBCESMAN_GEN_ALL_CORE
Goal Outcome Evaluation:  Plan of Care Reviewed With: patient        Progress: no change  Outcome Evaluation: PT eval complete. Pt continued to be drowsy from anesthesia and demonstrated decreased safety awareness at this time limiting mobility. Pt amb 50' with FWW and MIon A x2. No knee buckling or LOB noted. HEP and precautions reviewed with pt. Recommend d/c home with assist and HHPT tomorrow if medically appropriate. Plan to continue gait training and assess stairs next session.   If you are a smoker, it is important for your health to stop smoking. Please be aware that second hand smoke is also harmful.

## 2023-04-04 ENCOUNTER — ASC (OUTPATIENT)
Dept: URBAN - METROPOLITAN AREA SURGERY 8 | Facility: SURGERY | Age: 63
Setting detail: OPHTHALMOLOGY
End: 2023-04-04
Payer: MEDICAID

## 2023-05-02 ENCOUNTER — ASC (OUTPATIENT)
Dept: URBAN - METROPOLITAN AREA SURGERY 8 | Facility: SURGERY | Age: 63
Setting detail: OPHTHALMOLOGY
End: 2023-05-02
Payer: MEDICAID

## 2023-05-02 DIAGNOSIS — H25.12: ICD-10-CM

## 2023-05-02 DIAGNOSIS — H52.212: ICD-10-CM

## 2023-05-02 PROCEDURE — 66984 XCAPSL CTRC RMVL W/O ECP: CPT | Performed by: OPHTHALMOLOGY

## 2023-05-02 PROCEDURE — FEMTO FEMTOSECOND LASER: Performed by: OPHTHALMOLOGY

## 2023-05-03 ENCOUNTER — OFFICE (OUTPATIENT)
Facility: LOCATION | Age: 63
Setting detail: OPHTHALMOLOGY
End: 2023-05-03
Payer: MEDICAID

## 2023-05-03 DIAGNOSIS — Z96.1: ICD-10-CM

## 2023-05-03 PROCEDURE — 99024 POSTOP FOLLOW-UP VISIT: CPT | Performed by: OPHTHALMOLOGY

## 2023-05-03 ASSESSMENT — AXIALLENGTH_DERIVED
OD_AL: 23.1562
OS_AL: 34.0833
OD_AL: 22.8781
OS_AL: 22.7519

## 2023-05-03 ASSESSMENT — KERATOMETRY
OS_K1POWER_DIOPTERS: 44.00
OS_AXISANGLE_DEGREES: 116
OS_K2POWER_DIOPTERS: 43.50
OD_K1POWER_DIOPTERS: 42.75
OD_K2POWER_DIOPTERS: 43.75
OD_AXISANGLE_DEGREES: 125

## 2023-05-03 ASSESSMENT — REFRACTION_MANIFEST
OD_CYLINDER: -2.25
OD_AXIS: 066
OS_VA1: 20/UNABLE
OD_VA1: 20/80-2
OS_SPHERE: UNABLE
OS_CYLINDER: -0.25
OS_SPHERE: -18.75
OD_SPHERE: +2.50
OS_AXIS: 090
OD_AXIS: 070
OD_SPHERE: +3.00
OD_VA1: 20/125
OD_CYLINDER: -1.75

## 2023-05-03 ASSESSMENT — REFRACTION_AUTOREFRACTION
OS_AXIS: 048
OS_CYLINDER: -1.50
OS_SPHERE: +2.75
OD_SPHERE: UNABLE

## 2023-05-03 ASSESSMENT — SCHIRMERS TEST
OD_SCHIRMERS: 5
OS_SCHIRMERS: 5

## 2023-05-03 ASSESSMENT — SPHEQUIV_DERIVED
OD_SPHEQUIV: 2.125
OS_SPHEQUIV: 2
OD_SPHEQUIV: 1.375
OS_SPHEQUIV: -18.875

## 2023-05-03 ASSESSMENT — VISUAL ACUITY
OD_BCVA: 20/400
OS_BCVA: 20/200

## 2023-05-03 ASSESSMENT — CONFRONTATIONAL VISUAL FIELD TEST (CVF)
OS_FINDINGS: FULL
OD_FINDINGS: FULL

## 2023-05-09 ENCOUNTER — OFFICE (OUTPATIENT)
Facility: LOCATION | Age: 63
Setting detail: OPHTHALMOLOGY
End: 2023-05-09
Payer: MEDICAID

## 2023-05-09 DIAGNOSIS — Z96.1: ICD-10-CM

## 2023-05-09 DIAGNOSIS — H25.11: ICD-10-CM

## 2023-05-09 PROCEDURE — 92136 OPHTHALMIC BIOMETRY: CPT | Performed by: OPHTHALMOLOGY

## 2023-05-09 PROCEDURE — 99024 POSTOP FOLLOW-UP VISIT: CPT | Performed by: OPHTHALMOLOGY

## 2023-05-09 ASSESSMENT — SPHEQUIV_DERIVED
OD_SPHEQUIV: 2.125
OS_SPHEQUIV: -18.875
OD_SPHEQUIV: 1.375
OS_SPHEQUIV: 1.875

## 2023-05-09 ASSESSMENT — VISUAL ACUITY
OS_BCVA: 20/200
OD_BCVA: 20/150

## 2023-05-09 ASSESSMENT — REFRACTION_MANIFEST
OS_VA1: 20/UNABLE
OD_SPHERE: +2.50
OS_AXIS: 090
OD_AXIS: 066
OS_CYLINDER: -0.25
OD_SPHERE: +3.00
OD_VA1: 20/80-2
OS_SPHERE: -18.75
OS_SPHERE: UNABLE
OD_AXIS: 070
OD_CYLINDER: -2.25
OD_CYLINDER: -1.75
OD_VA1: 20/125

## 2023-05-09 ASSESSMENT — KERATOMETRY
OD_AXISANGLE_DEGREES: 125
OS_K1POWER_DIOPTERS: 44.00
OD_K2POWER_DIOPTERS: 43.75
OS_AXISANGLE_DEGREES: 118
OS_K2POWER_DIOPTERS: 43.50
OD_K1POWER_DIOPTERS: 42.75

## 2023-05-09 ASSESSMENT — AXIALLENGTH_DERIVED
OS_AL: 34.0833
OD_AL: 23.1562
OD_AL: 22.8781
OS_AL: 22.7973

## 2023-05-09 ASSESSMENT — SCHIRMERS TEST
OS_SCHIRMERS: 5
OD_SCHIRMERS: 5

## 2023-05-09 ASSESSMENT — CONFRONTATIONAL VISUAL FIELD TEST (CVF)
OS_FINDINGS: FULL
OD_FINDINGS: FULL

## 2023-05-09 ASSESSMENT — REFRACTION_AUTOREFRACTION
OS_SPHERE: +2.50
OD_SPHERE: UNABLE
OS_CYLINDER: -1.25
OS_AXIS: 062

## 2023-07-28 NOTE — ED ADULT NURSE NOTE - GENITOURINARY ASSESSMENT
- - - V-Y Flap Text: Due to geometric and functional constraints, a flap reconstruction was performed to reconstruct the defect. To that end, adjacent tissue was incised and carried over to close the defect in the following manner: The defect edges were debeveled with a #15 scalpel blade.  Given the location of the defect, shape of the defect and the proximity to free margins a V-Y flap was deemed most appropriate.  Using a sterile surgical marker, an appropriate advancement flap was drawn incorporating the defect and placing the expected incisions within the relaxed skin tension lines where possible.    The area thus outlined was incised deep to adipose tissue with a #15 scalpel blade.  The skin margins were undermined to an appropriate distance in all directions utilizing iris scissors.

## 2023-12-14 NOTE — PHYSICAL THERAPY INITIAL EVALUATION ADULT - LEVEL OF INDEPENDENCE: SUPINE/SIT, REHAB EVAL
Addendum  created 12/14/23 1129 by Merling, Brandon J, MD    Intraprocedure Event edited       minimum assist (75% patients effort)
